# Patient Record
Sex: FEMALE | Race: WHITE | HISPANIC OR LATINO | Employment: UNEMPLOYED | URBAN - METROPOLITAN AREA
[De-identification: names, ages, dates, MRNs, and addresses within clinical notes are randomized per-mention and may not be internally consistent; named-entity substitution may affect disease eponyms.]

---

## 2017-05-13 ENCOUNTER — HOSPITAL ENCOUNTER (EMERGENCY)
Facility: MEDICAL CENTER | Age: 27
End: 2017-05-14
Attending: EMERGENCY MEDICINE

## 2017-05-13 VITALS
TEMPERATURE: 96.8 F | HEART RATE: 85 BPM | SYSTOLIC BLOOD PRESSURE: 99 MMHG | RESPIRATION RATE: 14 BRPM | BODY MASS INDEX: 32.53 KG/M2 | WEIGHT: 150.79 LBS | OXYGEN SATURATION: 98 % | HEIGHT: 57 IN | DIASTOLIC BLOOD PRESSURE: 66 MMHG

## 2017-05-13 DIAGNOSIS — B96.89 BACTERIAL VAGINOSIS: ICD-10-CM

## 2017-05-13 DIAGNOSIS — N76.0 BACTERIAL VAGINOSIS: ICD-10-CM

## 2017-05-13 LAB
AMORPH CRY #/AREA URNS HPF: PRESENT /HPF
APPEARANCE UR: ABNORMAL
BACTERIA GENITAL QL WET PREP: NORMAL
BILIRUB UR QL STRIP.AUTO: NEGATIVE
COLOR UR: ABNORMAL
EPI CELLS #/AREA URNS HPF: NORMAL /HPF
GLUCOSE UR STRIP.AUTO-MCNC: NEGATIVE MG/DL
KETONES UR STRIP.AUTO-MCNC: NEGATIVE MG/DL
LEUKOCYTE ESTERASE UR QL STRIP.AUTO: ABNORMAL
MICRO URNS: ABNORMAL
NITRITE UR QL STRIP.AUTO: NEGATIVE
PH UR STRIP.AUTO: 7 [PH]
PROT UR QL STRIP: NEGATIVE MG/DL
RBC # URNS HPF: NORMAL /HPF
RBC UR QL AUTO: NEGATIVE
SIGNIFICANT IND 70042: NORMAL
SITE SITE: NORMAL
SOURCE SOURCE: NORMAL
SP GR UR STRIP.AUTO: 1.01
WBC #/AREA URNS HPF: NORMAL /HPF

## 2017-05-13 PROCEDURE — 81001 URINALYSIS AUTO W/SCOPE: CPT

## 2017-05-13 PROCEDURE — 99284 EMERGENCY DEPT VISIT MOD MDM: CPT

## 2017-05-13 PROCEDURE — 87491 CHLMYD TRACH DNA AMP PROBE: CPT

## 2017-05-13 PROCEDURE — 87591 N.GONORRHOEAE DNA AMP PROB: CPT

## 2017-05-13 RX ORDER — METRONIDAZOLE 500 MG/1
500 TABLET ORAL 2 TIMES DAILY
Qty: 14 TAB | Refills: 0 | Status: SHIPPED | OUTPATIENT
Start: 2017-05-13 | End: 2017-05-20

## 2017-05-13 NOTE — ED AVS SNAPSHOT
5/13/2017    Carolina Leong  [address] [place] [address]    Dear Carolina:    [facility] wants to ensure your discharge home is safe and you or your loved ones have had all of your questions answered regarding your care after you leave the hospital.    Below is a list of resources and contact information should you have any questions regarding your hospital stay, follow-up instructions, or active medical symptoms.    Questions or Concerns Regarding… Contact   Medical Questions Related to Your Discharge  (7 days a week, 8am-5pm) Contact a Nurse Care Coordinator   [phone]   Medical Questions Not Related to Your Discharge  (24 hours a day / 7 days a week)  Contact the Nurse Health Line   [phone]    Medications or Discharge Instructions Refer to your discharge packet   or contact your [facility] Primary Care Provider   [phone]   Follow-up Appointment(s) Schedule your appointment via BitX   or contact Scheduling [phone]   Billing Review your statement via BitX  or contact Billing [phone]   Medical Records Review your records via BitX   or contact Medical Records [phone]     You may receive a telephone call within two days of discharge. This call is to make certain you understand your discharge instructions and have the opportunity to have any questions answered. You can also easily access your medical information, test results and upcoming appointments via the BitX free online health management tool. You can learn more and sign up at [employer]/BitX. For assistance setting up your BitX account, please call [phone].    Once again, we want to ensure your discharge home is safe and that you have a clear understanding of any next steps in your care. If you have any questions or concerns, please do not hesitate to contact us, we are here for you. Thank you for choosing [facility] for your healthcare needs.    Sincerely,    Your [facility] Healthcare Team

## 2017-05-13 NOTE — ED AVS SNAPSHOT
Home Care Instructions                                                                                                                Carolina Leong   MRN: [id number]    Department:  [facility], Emergency Dept   Date of Visit:  5/13/2017            [facility], Emergency Dept    [address] [address]    Phone:  [phone]      You were seen by     1. Vinicio Bhagat M.D.    2. Ghazal Frias M.D.      Your Diagnosis Was     Bacterial vaginosis     N76.0, B96.89       Follow-up Information     1. Follow up with [facility]. Schedule an appointment as soon as possible for a visit in 2 days.    Contact information    [address] [place] [id number] [phone]        Medication Information     Review all of your home medications and newly ordered medications with your primary doctor and/or pharmacist as soon as possible. Follow medication instructions as directed by your doctor and/or pharmacist.     Please keep your complete medication list with you and share with your physician. Update the information when medications are discontinued, doses are changed, or new medications (including over-the-counter products) are added; and carry medication information at all times in the event of emergency situations.               Medication List      START taking these medications        Instructions    Morning Afternoon Evening Bedtime    metronidazole 500 MG Tabs   Commonly known as:  FLAGYL        Take 1 Tab by mouth 2 Times a Day for 7 days.   Dose:  500 mg                          ASK your doctor about these medications        Instructions    Morning Afternoon Evening Bedtime    ibuprofen 800 MG Tabs   Commonly known as:  MOTRIN        Take 1 Tab by mouth every 8 hours as needed (Cramping).   Dose:  800 mg                        PRENATAL VITAMIN PO        Take  by mouth.                             Where to Get Your Medications      You  can get these medications from any pharmacy     Bring a paper prescription for each of these medications    - metronidazole 500 MG Tabs            Procedures and tests performed during your visit     CHLAMYDIA & GC BY PCR    URINALYSIS    URINE MICROSCOPIC (W/UA)    WET PREP        Discharge Instructions       Vaginosis bacteriana  (Bacterial Vaginosis)  La vaginosis bacteriana es neela infección vaginal que perturba el equilibrio normal de las bacterias que se encuentran en la vagina. Es el resultado de un crecimiento excesivo de ciertas bacterias. Esta es la infección vaginal más frecuente en mujeres en edad reproductiva. El tratamiento es importante para prevenir complicaciones, especialmente en mujeres embarazadas, dado que puede causar un parto prematuro.  CAUSAS   La vaginosis bacteriana se origina por un aumento de bacterias nocivas que, generalmente, están presentes en cantidades más pequeñas en la vagina. Varios tipos diferentes de bacterias pueden causar esta afección. Sin embargo, la causa de arroyo desarrollo no se comprende totalmente.  FACTORES DE RIESGO  Ciertas actividades o comportamientos pueden exponerlo a un mayor riesgo de desarrollar vaginosis bacteriana, entre los que se incluyen:  · Tener neela nueva mary sexual o múltiples parejas sexuales.  · Las duchas vaginales  · El uso del DIU (dispositivo intrauterino) chio método anticonceptivo.  El contagio no se produce en julienne, por ropas de cama, en piscinas o por contacto con objetos.  SIGNOS Y SÍNTOMAS   Algunas mujeres que padecen vaginosis bacteriana no presentan signos ni síntomas. Los síntomas más comunes son:  · Secreción vaginal de color grisáceo.  · Secreción vaginal con olor similar al pescado, especialmente después de mantener relaciones sexuales.  · Picazón o sensación de ardor en la vagina o la vulva.  · Ardor o dolor al orinar.  DIAGNÓSTICO   Arroyo médico analizará arroyo historia clínica y le examinará la vagina para detectar signos de vaginosis  bacteriana. Puede tomarle neela muestra de flujo vaginal. Arroyo médico examinará esta muestra con un microscopio para controlar las bacterias y células anormales. También puede realizarse un análisis del pH vaginal.   TRATAMIENTO   La vaginosis bacteriana puede tratarse con antibióticos, en forma de comprimidos o de crema vaginal. Puede indicarse neela segunda tanda de antibióticos si la afección se repite después del tratamiento. Debido a que la vaginosis bacteriana aumenta el riesgo de contraer enfermedades de transmisión sexual, el tratamiento puede ayudar a reducir el riesgo de clamidia, gonorrea, VIH y herpes.  INSTRUCCIONES PARA EL CUIDADO EN EL HOGAR   · Lind solo medicamentos de venta zoltan o recetados, según las indicaciones del médico.  · Si le pina recetado antibióticos, tómelos chio se le indicó. Asegúrese de que finaliza la prescripción completa aunque se sienta mejor.  · Comunique a juani compañeros sexuales que sufre neela infección vaginal. Deben consultar a arroyo médico y recibir tratamiento si tienen problemas, chio picazón o neela erupción cutánea leve.  · Radha el tratamiento, es importante que siga estas indicaciones:  ¨ Evite mantener relaciones sexuales o use preservativos de la forma correcta.  ¨ No se ray duchas vaginales.  ¨ Evite consumir alcohol chio se lo haya indicado el médico.  ¨ Evite amamantar chio se lo haya indicado el médico.  SOLICITE ATENCIÓN MÉDICA SI:   · Juani síntomas no mejoran después de 3 días de tratamiento.  · Aumenta la secreción o el dolor.  · Tiene fiebre.  ASEGÚRESE DE QUE:   · Comprende estas instrucciones.  · Controlará arroyo afección.  · Recibirá ayuda de inmediato si no mejora o si empeora.  PARA OBTENER MÁS INFORMACIÓN   Centros para el control y la prevención de enfermedades (Centers for Disease Control and Prevention, CDC): www.cdc.gov/std  Asociación Estadounidense de la Jana Sexual (American Sexual Health Association, SHA): www.ashastd.org      Esta información no tiene  chio fin reemplazar el consejo del médico. Asegúrese de hacerle al médico cualquier pregunta que tenga.     Document Released: 03/26/2009 Document Revised: 01/08/2016  Elsevier Interactive Patient Education ©2016 Elsevier Inc.            Patient Information     Patient Information    Following emergency treatment: all patient requiring follow-up care must return either to a private physician or a clinic if your condition worsens before you are able to obtain further medical attention, please return to the emergency room.     Billing Information    At [facility], we work to make the billing process streamlined for our patients.  Our Representatives are here to answer any questions you may have regarding your hospital bill.  If you have insurance coverage and have supplied your insurance information to us, we will submit a claim to your insurer on your behalf.  Should you have any questions regarding your bill, we can be reached online or by phone as follows:  Online: You are able pay your bills online or live chat with our representatives about any billing questions you may have. We are here to help Monday - Friday from 8:00am to 7:30pm and 9:00am - 12:00pm on Saturdays.  Please visit [email or web address]  for more information.   Phone:  [phone] or [phone]    Please note that your emergency physician, surgeon, pathologist, radiologist, anesthesiologist, and other specialists are not employed by [facility] and will therefore bill separately for their services.  Please contact them directly for any questions concerning their bills at the numbers below:     Emergency Physician Services:  [phone]  [facility]:  [phone]  [facility]:  [phone]  [facility]:  [phone]    1. Your final bill may vary from the amount quoted upon discharge if all procedures are not complete at that time, or if your doctor has  additional procedures of which we are not aware. You will receive an additional bill if you return to the Emergency Department at [facility] for suture removal regardless of the facility of which the sutures were placed.     2. Please arrange for settlement of this account at the emergency registration.    3. All self-pay accounts are due in full at the time of treatment.  If you are unable to meet this obligation then payment is expected within 4-5 days.     4. If you have had radiology studies (CT, X-ray, Ultrasound, MRI), you have received a preliminary result during your emergency department visit. Please contact the radiology department [phone] to receive a copy of your final result. Please discuss the Final result with your primary physician or with the follow up physician provided.     Crisis Hotline:  [place] Crisis Hotline:  0-777-USRYXRW or 1-496.336.9804  [place] Crisis Hotline:    1-316.507.4019 or 850-906-2639         ED Discharge Follow Up Questions    1. In order to provide you with very good care, we would like to follow up with a phone call in the next few days.  May we have your permission to contact you?     YES /  NO    2. What is the best phone number to call you? (       )_____-__________    3. What is the best time to call you?      Morning  /  Afternoon  /  Evening                   Patient Signature:  ____________________________________________________________    Date:  ____________________________________________________________

## 2017-05-13 NOTE — ED AVS SNAPSHOT
AlwaySupport Access Code: [email or web address]  Expires: 6/12/2017 11:54 PM    Your email address is not on file at [facility].  Email Addresses are required for you to sign up for AlwaySupport, please contact [phone] to verify your personal information and to provide your email address prior to attempting to register for AlwaySupport.    Carolina Leong  [address] [place] [address]    AlwaySupport  A secure, online tool to manage your health information     [employer]’s AlwaySupport® is a secure, online tool that connects you to your personalized health information from the privacy of your home -- day or night - making it very easy for you to manage your healthcare. Once the activation process is completed, you can even access your medical information using the AlwaySupport stiven, which is available for free in the Apple Stiven store or Google Play store.     To learn more about AlwaySupport, visit [email or web address]    There are two levels of access available (as shown below):   My Chart Features  [facility] Primary Care Doctor [facility]  Specialists [facility]  Urgent  Care Non-[facility] Primary Care Doctor   Email your healthcare team securely and privately 24/7 X X X    Manage appointments: schedule your next appointment; view details of past/upcoming appointments X      Request prescription refills. X      View recent personal medical records, including lab and immunizations X X X X   View health record, including health history, allergies, medications X X X X   Read reports about your outpatient visits, procedures, consult and ER notes X X X X   See your discharge summary, which is a recap of your hospital and/or ER visit that includes your diagnosis, lab results, and care plan X X  X     How to register for AlwaySupport:  Once your e-mail address has been verified, follow the following steps to sign up for AlwaySupport.     1. Go to  [email or web address]  2. Click on the Sign Up Now box, which takes you to the New Member Sign Up page. You  will need to provide the following information:  a. Enter your [employer] Access Code exactly as it appears at the top of this page. (You will not need to use this code after you’ve completed the sign-up process. If you do not sign up before the expiration date, you must request a new code.)   b. Enter your date of birth.   c. Enter your home email address.   d. Click Submit, and follow the next screen’s instructions.  3. Create a [employer] ID. This will be your [employer] login ID and cannot be changed, so think of one that is secure and easy to remember.  4. Create a [employer] password. You can change your password at any time.  5. Enter your Password Reset Question and Answer. This can be used at a later time if you forget your password.   6. Enter your e-mail address. This allows you to receive e-mail notifications when new information is available in [employer].  7. Click Sign Up. You can now view your health information.    For assistance activating your [employer] account, call [phone]

## 2017-05-14 NOTE — ED NOTES
Pt c/o vaginal itching and burning with urination x 2 days. Pt states she is pregnant that, but not sure how far along. Last period was in December. Pt is  with 3 full term healthy vaginal deliveries.

## 2017-05-14 NOTE — ED NOTES
.  Chief Complaint   Patient presents with   • Vaginal Itching     pt co vaginal itching and burning for 2 days now, denies any discharge, is 3months pregnant,      .Pt Informed regarding triage process and verbalized understanding to inform triage tech or RN for any changes in condition.  Placed in lobby.

## 2017-05-14 NOTE — ED NOTES
All lines and monitors d/c'd. Discharge paperwork and medications reviewed. Pt states she understands and had no questions. Pt encouraged to follow-up with PCP and come back to ER with worsening symptoms.  Pt verbalizes understanding. Pt ambulated out of ED with steady gait.

## 2017-05-14 NOTE — ED PROVIDER NOTES
"ED Provider Note    Scribed for Ghazal Frias M.D. by Mg Velasquez. 2017, 9:55 PM.    Primary care provider: Pt States None  Means of arrival: Private vehicle   History obtained from: Patient   History limited by: None     CHIEF COMPLAINT  Chief Complaint   Patient presents with   • Vaginal Itching     pt co vaginal itching and burning for 2 days now, denies any discharge, is 3months pregnant,        HPI  Carolina Leong is a 27 y.o. female who presents to the Emergency Department with vaginal itching for two days. Patient also reports burning with urination. She is currently 3 months gravid and is . Patient also complains of mild bilateral flank pain. Patient denies vaginal discharge, hematuria, fever, chills, abdominal pain, or other symptoms.     REVIEW OF SYSTEMS  Pertinent positives include vaginal itching, burning with urination, flank pain. Pertinent negatives include no vaginal discharge, hematuria, fever, chills, abdominal pain. E.      PAST MEDICAL HISTORY       SURGICAL HISTORY  patient denies any surgical history    SOCIAL HISTORY  Social History   Substance Use Topics   • Smoking status: Never Smoker    • Smokeless tobacco: Never Used   • Alcohol Use: No      Comment: occ before pregnancy       History   Drug Use No       FAMILY HISTORY  History reviewed. No pertinent family history.    CURRENT MEDICATIONS  Home Medications     Reviewed by Nan De Leon R.N. (Registered Nurse) on 17 at 213  Med List Status: Partial    Medication Last Dose Status    ibuprofen (MOTRIN) 800 MG TABS not taking Active    Prenatal Vit-Fe Sulfate-FA (PRENATAL VITAMIN PO) 2017 Active                ALLERGIES  No Known Allergies    PHYSICAL EXAM  VITAL SIGNS: /92 mmHg  Pulse 92  Temp(Src) 36 °C (96.8 °F)  Resp 14  Ht 1.448 m (4' 9\")  Wt 68.4 kg (150 lb 12.7 oz)  BMI 32.62 kg/m2  SpO2 98%  LMP 2016 (Approximate)    Constitutional:  Alert and in no apparent " distress.  HENT: Normocephalic, Bilateral external ears normal. Nose normal.   Eyes: Pupils are equal and reactive. Conjunctiva normal, non-icteric.   Cardiovascular:  Good Perfusion  Thorax & Lungs: Easy unlabored respirations  Abdomen:  No pain with movement   Skin: Visualized skin is  Dry, No erythema, No rash.   : Cottage cheese whitish discharge  Extremities:  Moves all extremities   Neurologic: Alert, Grossly non-focal.   Psychiatric: Appropriate Mood    DIAGNOSTIC STUDIES / PROCEDURES    LABS  Results for orders placed or performed during the hospital encounter of 05/13/17   URINALYSIS   Result Value Ref Range    Micro Urine Req Microscopic     Color Lt. Yellow     Character Sl Cloudy (A)     Specific Gravity 1.015 <1.035    Ph 7.0 5.0-8.0    Glucose Negative Negative mg/dL    Ketones Negative Negative mg/dL    Protein Negative Negative mg/dL    Bilirubin Negative Negative    Nitrite Negative Negative    Leukocyte Esterase Large (A) Negative    Occult Blood Negative Negative   URINE MICROSCOPIC (W/UA)   Result Value Ref Range    WBC 2-5 /hpf    RBC 0-2 /hpf    Epithelial Cells Few /hpf    Amorphous Crystal Present /hpf   WET PREP   Result Value Ref Range    Significant Indicator NEG     Source GEN     Site VAGINAL     Wet Prep For Parasites       Few clue cells seen.  Few WBC's seen.  No yeast.  No motile Trichomonas seen.     CHLAMYDIA & GC BY PCR   Result Value Ref Range    Source Other       All labs reviewed by me.    COURSE & MEDICAL DECISION MAKING  Nursing notes and vital signs were reviewed. (See chart for details)  The patient's records were reviewed, history was obtained from the patient;     The patient presents with vaginal itching, and the differential diagnosis includes but is not limited to UTI, candidal infection, bacteria vaginosis.       9:55 PM Pelvic exam performed with female chaperone scribe in the room.   Initial orders in the Emergency Department included urine microscopic,  urinalysis, wet prep, chlamydia & GC. I explained to the patient I will order labs to evaluate and complete an ultrasound.    10:29 PM Bedside ultrasound completed by me showed an intrauterine pregnancy.    ED testing reveals clue cells. I suspect she has bacterial vaginosis. On review it is pregnancy category B and I will give her a seven-day course, she will follow up with primary care physician      The patient was discharged home with an information sheet on bacterial vagniosis and told to return immediately for any signs or symptoms listed, but specifically if fever, vomting.  The patient agreed to the discharge precautions and follow-up plan which is documented in EPIC.    DISPOSITION:  Patient will be discharged home in stable condition.    FOLLOW UP:  [facility]  [address] [address]  [phone]    Schedule an appointment as soon as possible for a visit in 2 days        OUTPATIENT MEDICATIONS:  New Prescriptions    METRONIDAZOLE (FLAGYL) 500 MG TAB    Take 1 Tab by mouth 2 Times a Day for 7 days.       FINAL IMPRESSION  1. Bacterial vaginosis          IMg (Vineet), am scribing for, and in the presence of, Ghazal Frias M.D..    Electronically signed by: Mg Velasquez (Vineet), 5/13/2017    IGhazal M.D. personally performed the services described in this documentation, as scribed by Mg Velasquez in my presence, and it is both accurate and complete.    The note accurately reflects work and decisions made by me.  Ghazal Frias  5/13/2017  11:54 PM

## 2017-05-14 NOTE — DISCHARGE INSTRUCTIONS
Vaginosis bacteriana  (Bacterial Vaginosis)  La vaginosis bacteriana es neela infección vaginal que perturba el equilibrio normal de las bacterias que se encuentran en la vagina. Es el resultado de un crecimiento excesivo de ciertas bacterias. Esta es la infección vaginal más frecuente en mujeres en edad reproductiva. El tratamiento es importante para prevenir complicaciones, especialmente en mujeres embarazadas, dado que puede causar un parto prematuro.  CAUSAS   La vaginosis bacteriana se origina por un aumento de bacterias nocivas que, generalmente, están presentes en cantidades más pequeñas en la vagina. Varios tipos diferentes de bacterias pueden causar esta afección. Sin embargo, la causa de arroyo desarrollo no se comprende totalmente.  FACTORES DE RIESGO  Ciertas actividades o comportamientos pueden exponerlo a un mayor riesgo de desarrollar vaginosis bacteriana, entre los que se incluyen:  · Tener neela nueva mary sexual o múltiples parejas sexuales.  · Las duchas vaginales  · El uso del DIU (dispositivo intrauterino) chio método anticonceptivo.  El contagio no se produce en julienne, por ropas de cama, en piscinas o por contacto con objetos.  SIGNOS Y SÍNTOMAS   Algunas mujeres que padecen vaginosis bacteriana no presentan signos ni síntomas. Los síntomas más comunes son:  · Secreción vaginal de color grisáceo.  · Secreción vaginal con olor similar al pescado, especialmente después de mantener relaciones sexuales.  · Picazón o sensación de ardor en la vagina o la vulva.  · Ardor o dolor al orinar.  DIAGNÓSTICO   Arroyo médico analizará arroyo historia clínica y le examinará la vagina para detectar signos de vaginosis bacteriana. Puede tomarle neela muestra de flujo vaginal. Arroyo médico examinará esta muestra con un microscopio para controlar las bacterias y células anormales. También puede realizarse un análisis del pH vaginal.   TRATAMIENTO   La vaginosis bacteriana puede tratarse con antibióticos, en forma de comprimidos o  de crema vaginal. Puede indicarse neela segunda tanda de antibióticos si la afección se repite después del tratamiento. Debido a que la vaginosis bacteriana aumenta el riesgo de contraer enfermedades de transmisión sexual, el tratamiento puede ayudar a reducir el riesgo de clamidia, gonorrea, VIH y herpes.  INSTRUCCIONES PARA EL CUIDADO EN EL HOGAR   · [place] solo medicamentos de venta zoltan o recetados, según las indicaciones del médico.  · Si le pina recetado antibióticos, tómelos chio se le indicó. Asegúrese de que finaliza la prescripción completa aunque se sienta mejor.  · Comunique a juani compañeros sexuales que sufre neela infección vaginal. Deben consultar a arroyo médico y recibir tratamiento si tienen problemas, chio picazón o neela erupción cutánea leve.  · Radha el tratamiento, es importante que siga estas indicaciones:  ¨ Evite mantener relaciones sexuales o use preservativos de la forma correcta.  ¨ No se ray duchas vaginales.  ¨ Evite consumir alcohol chio se lo haya indicado el médico.  ¨ Evite amamantar chio se lo haya indicado el médico.  SOLICITE ATENCIÓN MÉDICA SI:   · Juani síntomas no mejoran después de 3 días de tratamiento.  · Aumenta la secreción o el dolor.  · Tiene fiebre.  ASEGÚRESE DE QUE:   · Comprende estas instrucciones.  · Controlará arroyo afección.  · Recibirá ayuda de inmediato si no mejora o si empeora.  PARA OBTENER MÁS INFORMACIÓN   Centros para el control y la prevención de enfermedades (Centers for Disease Control and Prevention, CDC): www.cdc.gov/std  Asociación Est[place] de la Jana Sexual (American Sexual Health Association, SHA): www.ashastd.org      Esta información no tiene chio fin reemplazar el consejo del médico. Asegúrese de hacerle al médico cualquier pregunta que tenga.     Document Released: 03/26/2009 Document Revised: 01/08/2016  Elsevier Interactive Patient Education ©2016 Elsevier Inc.

## 2017-05-15 LAB
C TRACH DNA SPEC QL NAA+PROBE: NEGATIVE
N GONORRHOEA DNA SPEC QL NAA+PROBE: NEGATIVE
SPECIMEN SOURCE: NORMAL

## 2017-06-08 ENCOUNTER — APPOINTMENT (OUTPATIENT)
Dept: OBGYN | Facility: CLINIC | Age: 27
End: 2017-06-08
Payer: MEDICAID

## 2017-06-21 ENCOUNTER — INITIAL PRENATAL (OUTPATIENT)
Dept: OBGYN | Facility: CLINIC | Age: 27
End: 2017-06-21

## 2017-06-21 ENCOUNTER — HOSPITAL ENCOUNTER (OUTPATIENT)
Facility: MEDICAL CENTER | Age: 27
End: 2017-06-21
Attending: NURSE PRACTITIONER
Payer: COMMERCIAL

## 2017-06-21 VITALS
WEIGHT: 152 LBS | DIASTOLIC BLOOD PRESSURE: 60 MMHG | HEIGHT: 60 IN | BODY MASS INDEX: 29.84 KG/M2 | SYSTOLIC BLOOD PRESSURE: 100 MMHG

## 2017-06-21 DIAGNOSIS — Z34.80 SUPERVISION OF OTHER NORMAL PREGNANCY, ANTEPARTUM: ICD-10-CM

## 2017-06-21 LAB
APPEARANCE UR: NORMAL
BILIRUB UR STRIP-MCNC: NORMAL MG/DL
COLOR UR AUTO: NORMAL
CYTOLOGY REG CYTOL: NORMAL
GLUCOSE UR STRIP.AUTO-MCNC: NEGATIVE MG/DL
KETONES UR STRIP.AUTO-MCNC: NEGATIVE MG/DL
LEUKOCYTE ESTERASE UR QL STRIP.AUTO: NORMAL
NITRITE UR QL STRIP.AUTO: NEGATIVE
PH UR STRIP.AUTO: 6 [PH] (ref 5–8)
PROT UR QL STRIP: NORMAL MG/DL
RBC UR QL AUTO: NEGATIVE
SP GR UR STRIP.AUTO: 1.02
UROBILINOGEN UR STRIP-MCNC: NORMAL MG/DL

## 2017-06-21 PROCEDURE — 90715 TDAP VACCINE 7 YRS/> IM: CPT | Performed by: NURSE PRACTITIONER

## 2017-06-21 PROCEDURE — 81002 URINALYSIS NONAUTO W/O SCOPE: CPT | Performed by: NURSE PRACTITIONER

## 2017-06-21 PROCEDURE — 59401 PR NEW OB VISIT: CPT | Performed by: NURSE PRACTITIONER

## 2017-06-21 PROCEDURE — 90471 IMMUNIZATION ADMIN: CPT | Performed by: NURSE PRACTITIONER

## 2017-06-21 PROCEDURE — 8198 PREG CTR PKG RATE (SYSTEM): Performed by: NURSE PRACTITIONER

## 2017-06-21 NOTE — PROGRESS NOTES
Pt. Here for NOB visit today.  Good FM kick count sheet given today and explained.  # [phone] or [phone]  First prenatal care  Pt. States still having vaginal itching states went to [facility] on 5/2017 for vaginal itching states took all medication but still having symptoms.   Pharmacy verified.   Pt given 1 HR GTT today.   Tdap vaccine given today, right deltoid. Screening checklist reviewed with pt.    BTL consent form signed today.

## 2017-06-21 NOTE — PROGRESS NOTES
S:  Carolina Leong is a 27 y.o.  who presents for her new OB exam.  She is 27w4d with and IRIS of Estimated Date of Delivery: 17 by LMP. She has no complaints except for persistent vaginal pruritis. She had ER visit in May for same sx and was given Flagyl to no resolution. No prenatal ultrasound was done. She c/o right cracked nipple with no pruritis or abnormal discharge. She still occasionally breast feeds other child who is 2 years of age. States has been sore and problematic for about 2 months. Child does not have thrush, but does have dental caries. States the child bites her when feeding. No other reported issues. Hx of GDM with poor compliance in a previous pregnancy.  She is currently not working. No heavy lifting or chemical exposure.     Too late AFP.  declines CF.  Denies VB, LOF, or cramping.  Denies dysuria, vaginal DC. Reports good fetal movement.      Pregnancy is desired.      Past Medical History   Diagnosis Date   • BV (bacterial vaginosis) 2017     History reviewed. No pertinent family history.  Social History     Social History   • Marital Status:      Spouse Name: N/A   • Number of Children: N/A   • Years of Education: N/A     Occupational History   • Not on file.     Social History Main Topics   • Smoking status: Never Smoker    • Smokeless tobacco: Never Used   • Alcohol Use: No      Comment: occ before pregnancy    • Drug Use: No   • Sexual Activity:     Partners: Male     Birth Control/ Protection: Condom     Other Topics Concern   • Not on file     Social History Narrative     OB History    Para Term  AB SAB TAB Ectopic Multiple Living   5 3 3  1 1    3      # Outcome Date GA Lbr Vijay/2nd Weight Sex Delivery Anes PTL Lv   5 Current            4 Term 07/30/15 39w0d  3.175 kg (7 lb) M Vag-Spont EPI N Y      Comments: Pt states no complications.    3 Term 14 39w0d  3.175 kg (7 lb) M Vag-Spont None Y Y      Comments: Pt states no complications   2  Term 12 39w6d  3.515 kg (7 lb 12 oz) M Vag-Spont EPI N Y      Comments: Denies complications.   1 SAB  12w0d             Comments: no D&C needed         History of HSV I or II in self or partner: no  History of Thyroid problems: no    O:    Filed Vitals:    17 0831   BP: 100/60   Height: 1.524 m (5')   Weight: 68.947 kg (152 lb)      See Prenatal Physical.    Right dry, cracked nipple.   Wet mount: positive hyphae, neg clues, neg trich      A:   1.  IUP @ 27w4d per LMP        2.  S=D        3.  See problem list below        4.  Vulvovaginal candida        5.  Cracked nipple       Patient Active Problem List    Diagnosis Date Noted   • History of Gestational diabetes 2015   • Late prenatal care @ 31 wks  2015   • History of  labor with delivery at 35 wks  2015         P:  1.  GC/CT & pap done        2.  Prenatal labs ordered - lab slip given including gestational glucose        3.  Discussed PNV, diet, avoidances and adequate water intake        4.  NOB packet given        5.  Return to office in 2 wks        6.  Complete OB US first available.         7.  Monistat 7        8. Lanolin cream to nipple. Patient plans to stop breast feeding. I want this to heal before having to feed . Since this is so problematic, I think preparing for feeding the  should be first priority.        9.  TDAP today, BTL papers signed today.     No orders of the defined types were placed in this encounter.

## 2017-06-21 NOTE — Clinical Note
Cuente los Movimientos de arroyo Bebé  Otro paso importante para la basilia de arroyo bebé    Carolina Meyerl Salo     [facility]            Dept: [phone]    ¿Cuántas semanas tiene de embarazo? 27w4d    Fecha cuando tiene que comenzar a contar el movimiento: 6/21/17                  Vaughn debe usar rose mary diagrama    Neela manera en que arroyo doctor puede controlar a basilia de arroyo bebé es sabiendo cuantas veces se mueve arroyo bebé en el útero, o por medio de las “pataditas”.  Usted podrá ayudarle a arroyo médico al usar cada día el siguiente diagrama.    Cada día, usted debe prestar atención a cuantas horas le lleva a arroyo bebé moverse 10 veces.  Comience a contar en la mañana, lo antes posible después de haberse levantado.    · Primeramente, escriba la hora en que se mueve arroyo bebé, hasta llegar a 10 veces.  · Colóquele un check o palomita a cada cuadrito cada vez que arroyo bebé se mueva hasta que complete 10 veces.  · Escriba la hora cuando termine de contar 10 veces en la última columna.  · Sume el total del tiempo que le llevó contar los 10 movimientos.  · Finalmente, complete el cuadrito de cuantas horas le llevó hacerlo.    Después de vernell contado los 10 movimientos, ya no tendrá que contar los demás movimientos por el kristin del día.  A la mañana siguiente, comience a contar de nuevo cuantas veces se mueve el bebé desde el momento en que se levante.    ¿Qué tendría que considerarse un “movimiento”?  Es difícil de decirlo porque es distinto de neela madre a otra, y de un embarazo a otro.  Lo importante es que cuente el movimiento de la misma manera terell el transcurso de arroyo embarazo.  Si tiene preguntas adicionales, pregúntele a arroyo doctor.    ¡Cuente cuidadosamente cada día!     MUESTRA:  Semana 28    ¿Cuántas horas le ha llevado sentir 10 movimientos?        Hora de Inicio     1     2     3     4     5     6     7     8     9     10   Hora de Finlizar   Radha. 8:20 ·  ·  ·  ·  ·  ·  ·  ·  ·  ·  11:40   Mar.               Mié.                Jue.               Vie.               Sáb.               Dom.                 IMPORTANTE:  Usted debe contactar a arroyo doctor si le lleva más de 2 horas sentir 10 movimientos de arroyo bebé.    Cada mañana, escriba la hora de inicio y comience a contar los movimientos de arroyo bebé.  Hágalo colocándole un check o palomita a cada cuadrito cada vez que sienta un movimiento de arroyo bebé.  Cuando haya sentido 10 “pataditas”, escriba la hora en que terminó de contar en la última columna.  Luego, complete en la cajita (arriba de la skyler de check o palomita) el número total de horas que le llevó hacerlo.  Asegúrese de leer completamente las instrucciones en la página anterior.

## 2017-06-21 NOTE — MR AVS SNAPSHOT
Carolina Lieberman Salo   2017 8:30 AM   Initial Prenatal   MRN: [id number]    Department[facility]   Dept Phone:  [phone]    Description:  Female : 1990   Provider:  Debora Sandhu D.N.P.; [facility]           Allergies as of 2017     No Known Allergies      You were diagnosed with     Supervision of other normal pregnancy, antepartum   [[id number]]         Vital Signs     Blood Pressure Height Weight Body Mass Index Last Menstrual Period Breastfeeding?    100/60 mmHg 1.524 m (5') 68.947 kg (152 lb) 29.69 kg/m2 12/10/2016 Unknown    Smoking Status                   Never Smoker            Basic Information     Date Of Birth Sex Race Ethnicity Preferred Language    1990 Female Other  Origin (Citizen of [place]rian,Citizen of [place],Macedonian, etc) Citizen of [place]      Your appointments     2017  1:15 PM   OB Follow Up with Debora Sandhu D.N.P.   [facility] ([facility])    [address]  [place] [id number] [phone]            2017 10:00 AM   US PREG 60 with PREG CTR US 1   [facility] ([facility])    [facility]  [address]  [place] [address] [phone]           For [facility] patients only: 1. Please arrive 15 min prior to your appointment time. 2. If you're late, you will be rescheduled for the next available appointment. 3. If you need to reschedule your appointment, please call us at [phone] 48 hours prior to your appointment. 4. Do not bring children as they will not be allowed in exam room. 5. Only one family member may be present in room during exam. 6. The exam will be 30-60 minutes depending on the exam ordered by the physician. 7. The sonographer is not allowed to discuss findings during the exam. Your provider will go over the results with you at your next appointment. 8. The purpose of this ultrasound is to determine if baby is healthy. Diagnostic ultrasounds are  NOT to determine the gender of the baby. 9. NO photography or video recording is allowed in exam room. 10. NO cell phones allowed in the exam room. INFORMACION SOBRE ARROYO ULTRASONIDO 1. Por favor de llegar 15 minutos antes de arroyo saurav. 2. Si llega tarde, le tenemos que cambiar la saurav para otra fecha. 3. Si necesita cambiar arroyo saurav, por favor llame 48 horas antes de la saurav. [phone] 4. Por favor no traer niños. No se permiten en cuarto de Ultrasonido. 5. Solamente se permite neela persona en el cuarto terell el examen. 6. El examen dura 30-60 minutos, dependiendo del examen ordenado por el Doctor. 7. El Sonógrafo no está autorizado hablar sobre arroyo examen. Arroyo doctor o partera le va explicar los resultados en arroyo próxima saurav. 8. El propósito del Ultrasonido es para determinar si arroyo devonte viene saludable. No es para determinar el sexo de arroyo devonte. 9. Por favor no fotos o cámaras de grabar. 10. No celulares permitidos en el cuarto de examen.              Problem List              ICD-10-CM Priority Class Noted - Resolved    Late prenatal care @ 31 wks  O09.30   2015 - Present    History of  labor with delivery at 35 wks  Z87.51   2015 - Present    History of Gestational diabetes O24.419   2015 - Present      Health Maintenance        Date Due Completion Dates    IMM HEP B VACCINE (1 of 3 - Primary Series) 1990 ---    IMM HEP A VACCINE (1 of 2 - Standard Series) 1991 ---    IMM VARICELLA (CHICKENPOX) VACCINE (1 of 2 - 2 Dose Adolescent Series) 2003 ---    PAP SMEAR 2/3/2015 2/3/2012    IMM DTaP/Tdap/Td Vaccine (2 - Td) 2025, 2014, 2014            Results     POCT Urinalysis      Component Value Standard Range & Units    POC Color  Negative    POC Appearance  Negative    POC Leukocyte Esterase large Negative    POC Nitrites negative Negative    POC Urobiligen  Negative (0.2) mg/dL    POC Protein trace Negative mg/dL    POC Urine PH 6.0 5.0 - 8.0    POC Blood negative  Negative    POC Specific Gravity 1.020 <1.005 - >1.030    POC Ketones negative Negative mg/dL    POC Biliruben  Negative mg/dL    POC Glucose negative Negative mg/dL                        Current Immunizations     Tdap Vaccine  Incomplete, 6/8/2015  9:15 AM, 7/9/2014  1:05 PM, 6/26/2014      Below and/or attached are the medications your provider expects you to take. Review all of your home medications and newly ordered medications with your provider and/or pharmacist. Follow medication instructions as directed by your provider and/or pharmacist. Please keep your medication list with you and share with your provider. Update the information when medications are discontinued, doses are changed, or new medications (including over-the-counter products) are added; and carry medication information at all times in the event of emergency situations     Allergies:  No Known Allergies          Medications  Valid as of: June 21, 2017 - 10:03 AM    Generic Name Brand Name Tablet Size Instructions for use    Ibuprofen (Tab) MOTRIN 800 MG Take 1 Tab by mouth every 8 hours as needed (Cramping).        Prenatal Vit-Fe Fumarate-FA   Take  by mouth.        .                 Medicines prescribed today were sent to:     [facility] [address] [place] [address] AT [facility] & [address] [address]    Phone: [phone] Fax: [phone]    Open 24 Hours?: No      Medication refill instructions:       If your prescription bottle indicates you have medication refills left, it is not necessary to call your provider’s office. Please contact your pharmacy and they will refill your medication.    If your prescription bottle indicates you do not have any refills left, you may request refills at any time through one of the following ways: The online [employer] system (except Urgent Care), by calling your provider’s office, or by asking your pharmacy to contact your provider’s office with a refill  request. Medication refills are processed only during regular business hours and may not be available until the next business day. Your provider may request additional information or to have a follow-up visit with you prior to refilling your medication.   *Please Note: Medication refills are assigned a new Rx number when refilled electronically. Your pharmacy may indicate that no refills were authorized even though a new prescription for the same medication is available at the pharmacy. Please request the medicine by name with the pharmacy before contacting your provider for a refill.        Your To Do List     Future Labs/Procedures Complete By Expires    GLUCOSE 1HR GESTATIONAL  As directed 6/22/2018    PREG CNTR PRENATAL PN  As directed 6/22/2018    THINPREP PAP,REFLEX HPV ON ASC-US ONLY  As directed 6/22/2018    US-OB 2ND 3RD TRI COMPLETE  As directed 12/22/2017         [employer] Access Code: [email or web address]  Expires: 7/19/2017  4:15 AM    [employer]  A secure, online tool to manage your health information     [employer]® is a secure, online tool that connects you to your personalized health information from the privacy of your home -- day or night - making it very easy for you to manage your healthcare. Once the activation process is completed, you can even access your medical information using the [employer] stiven, which is available for free in the Apple Stiven store or Google Play store.     [employer] provides the following levels of access (as shown below):   My Chart Features   [facility] Doctor [facility]  Specialists [facility]  Urgent  Care Non-[facility]  Primary Care  Doctor   Email your healthcare team securely and privately 24/7 X X X    Manage appointments: schedule your next appointment; view details of past/upcoming appointments X      Request prescription refills. X      View recent personal medical records, including lab and immunizations X X X X   View health record, including health history,  allergies, medications X X X X   Read reports about your outpatient visits, procedures, consult and ER notes X X X X   See your discharge summary, which is a recap of your hospital and/or ER visit that includes your diagnosis, lab results, and care plan. X X       How to register for [facility]:  1. Go to  [email or web address].  2. Click on the Sign Up Now box, which takes you to the New Member Sign Up page. You will need to provide the following information:  a. Enter your [employer] Access Code exactly as it appears at the top of this page. (You will not need to use this code after you’ve completed the sign-up process. If you do not sign up before the expiration date, you must request a new code.)   b. Enter your date of birth.   c. Enter your home email address.   d. Click Submit, and follow the next screen’s instructions.  3. Create a GENERAL [employer] ID. This will be your [employer] login ID and cannot be changed, so think of one that is secure and easy to remember.  4. Create a [employer] password. You can change your password at any time.  5. Enter your Password Reset Question and Answer. This can be used at a later time if you forget your password.   6. Enter your e-mail address. This allows you to receive e-mail notifications when new information is available in [employer].  7. Click Sign Up. You can now view your health information.    For assistance activating your [employer] account, call [phone]

## 2017-06-27 ENCOUNTER — APPOINTMENT (OUTPATIENT)
Dept: RADIOLOGY | Facility: IMAGING CENTER | Age: 27
End: 2017-06-27
Attending: NURSE PRACTITIONER

## 2017-06-27 DIAGNOSIS — Z34.80 SUPERVISION OF OTHER NORMAL PREGNANCY, ANTEPARTUM: ICD-10-CM

## 2017-06-27 PROCEDURE — 76805 OB US >/= 14 WKS SNGL FETUS: CPT | Mod: 26 | Performed by: OBSTETRICS & GYNECOLOGY

## 2017-06-28 ENCOUNTER — TELEPHONE (OUTPATIENT)
Dept: OBGYN | Facility: CLINIC | Age: 27
End: 2017-06-28

## 2017-06-28 NOTE — PROGRESS NOTES
Referral faxed to [facility] on 6/28/17  They will contact patient to schedule appt.  Please check with patient if appt was made/ document. Thank you

## 2017-07-03 ENCOUNTER — HOSPITAL ENCOUNTER (OUTPATIENT)
Dept: LAB | Facility: MEDICAL CENTER | Age: 27
End: 2017-07-03
Attending: NURSE PRACTITIONER
Payer: COMMERCIAL

## 2017-07-03 DIAGNOSIS — Z34.80 SUPERVISION OF OTHER NORMAL PREGNANCY, ANTEPARTUM: ICD-10-CM

## 2017-07-03 LAB
ABO GROUP BLD: NORMAL
AMORPH CRY #/AREA URNS HPF: PRESENT /HPF
APPEARANCE UR: ABNORMAL
BACTERIA #/AREA URNS HPF: ABNORMAL /HPF
BASOPHILS # BLD AUTO: 0.4 % (ref 0–1.8)
BASOPHILS # BLD: 0.03 K/UL (ref 0–0.12)
BILIRUB UR QL STRIP.AUTO: NEGATIVE
BLD GP AB SCN SERPL QL: NORMAL
CAOX CRY #/AREA URNS HPF: ABNORMAL /HPF
COLOR UR: YELLOW
CULTURE IF INDICATED INDCX: YES UA CULTURE
EOSINOPHIL # BLD AUTO: 0.09 K/UL (ref 0–0.51)
EOSINOPHIL NFR BLD: 1.1 % (ref 0–6.9)
EPI CELLS #/AREA URNS HPF: ABNORMAL /HPF
ERYTHROCYTE [DISTWIDTH] IN BLOOD BY AUTOMATED COUNT: 42.7 FL (ref 35.9–50)
GLUCOSE 1H P 50 G GLC PO SERPL-MCNC: 142 MG/DL (ref 70–139)
GLUCOSE UR STRIP.AUTO-MCNC: NEGATIVE MG/DL
HBV SURFACE AG SER QL: NEGATIVE
HCT VFR BLD AUTO: 39 % (ref 37–47)
HGB BLD-MCNC: 13.1 G/DL (ref 12–16)
HIV 1+2 AB+HIV1 P24 AG SERPL QL IA: NON REACTIVE
IMM GRANULOCYTES # BLD AUTO: 0.05 K/UL (ref 0–0.11)
IMM GRANULOCYTES NFR BLD AUTO: 0.6 % (ref 0–0.9)
KETONES UR STRIP.AUTO-MCNC: NEGATIVE MG/DL
LEUKOCYTE ESTERASE UR QL STRIP.AUTO: ABNORMAL
LYMPHOCYTES # BLD AUTO: 2.17 K/UL (ref 1–4.8)
LYMPHOCYTES NFR BLD: 26.2 % (ref 22–41)
MCH RBC QN AUTO: 29.9 PG (ref 27–33)
MCHC RBC AUTO-ENTMCNC: 33.6 G/DL (ref 33.6–35)
MCV RBC AUTO: 89 FL (ref 81.4–97.8)
MICRO URNS: ABNORMAL
MONOCYTES # BLD AUTO: 0.57 K/UL (ref 0–0.85)
MONOCYTES NFR BLD AUTO: 6.9 % (ref 0–13.4)
NEUTROPHILS # BLD AUTO: 5.38 K/UL (ref 2–7.15)
NEUTROPHILS NFR BLD: 64.8 % (ref 44–72)
NITRITE UR QL STRIP.AUTO: NEGATIVE
NRBC # BLD AUTO: 0 K/UL
NRBC BLD AUTO-RTO: 0 /100 WBC
PH UR STRIP.AUTO: 6.5 [PH]
PLATELET # BLD AUTO: 192 K/UL (ref 164–446)
PMV BLD AUTO: 11.9 FL (ref 9–12.9)
PROT UR QL STRIP: NEGATIVE MG/DL
RBC # BLD AUTO: 4.38 M/UL (ref 4.2–5.4)
RBC # URNS HPF: ABNORMAL /HPF
RBC UR QL AUTO: NEGATIVE
RH BLD: NORMAL
RUBV AB SER QL: 116.9 IU/ML
SP GR UR STRIP.AUTO: 1.02
TREPONEMA PALLIDUM IGG+IGM AB [PRESENCE] IN SERUM OR PLASMA BY IMMUNOASSAY: NON REACTIVE
WBC # BLD AUTO: 8.3 K/UL (ref 4.8–10.8)
WBC #/AREA URNS HPF: ABNORMAL /HPF

## 2017-07-05 LAB
BACTERIA UR CULT: NORMAL
SIGNIFICANT IND 70042: NORMAL
SOURCE SOURCE: NORMAL

## 2017-07-06 ENCOUNTER — ROUTINE PRENATAL (OUTPATIENT)
Dept: OBGYN | Facility: CLINIC | Age: 27
End: 2017-07-06

## 2017-07-06 VITALS — WEIGHT: 152 LBS | DIASTOLIC BLOOD PRESSURE: 78 MMHG | BODY MASS INDEX: 29.69 KG/M2 | SYSTOLIC BLOOD PRESSURE: 100 MMHG

## 2017-07-06 DIAGNOSIS — B96.89 BV (BACTERIAL VAGINOSIS): ICD-10-CM

## 2017-07-06 DIAGNOSIS — R73.09 ABNORMAL GTT (GLUCOSE TOLERANCE TEST): ICD-10-CM

## 2017-07-06 DIAGNOSIS — N76.0 BV (BACTERIAL VAGINOSIS): ICD-10-CM

## 2017-07-06 DIAGNOSIS — O28.3 ABNORMAL PREGNANCY US: ICD-10-CM

## 2017-07-06 DIAGNOSIS — Z34.83 ENCOUNTER FOR SUPERVISION OF OTHER NORMAL PREGNANCY, THIRD TRIMESTER: Primary | ICD-10-CM

## 2017-07-06 PROCEDURE — 90040 PR PRENATAL FOLLOW UP: CPT | Performed by: NURSE PRACTITIONER

## 2017-07-06 RX ORDER — METRONIDAZOLE 500 MG/1
500 TABLET ORAL EVERY 12 HOURS
Qty: 14 TAB | Refills: 0 | Status: SHIPPED | OUTPATIENT
Start: 2017-07-06 | End: 2017-07-13

## 2017-07-06 NOTE — MR AVS SNAPSHOT
Carolinaadryan Leong   2017 1:15 PM   Routine Prenatal   MRN: [id number]    [facility]   Dept Phone:  [phone]    Description:  Female : 1990   Provider:  Katia Christie C.N.M.           Allergies as of 2017     No Known Allergies      You were diagnosed with     Encounter for supervision of other normal pregnancy, third trimester   [7511062]  -  Primary     Abnormal GTT (glucose tolerance test)   [275733]       BV (bacterial vaginosis)   [950788]         Vital Signs     Blood Pressure Weight Last Menstrual Period Smoking Status          100/78 mmHg 68.947 kg (152 lb) 12/10/2016 Never Smoker         Basic Information     Date Of Birth Sex Race Ethnicity Preferred Language    1990 Female Other  Origin (Gambian,Swazi,Tuvaluan,[place], etc) Gambian      Your appointments     2017  9:30 AM   OB Follow Up with Debora Sandhu D.N.P.   [facility] [address] [address] [phone]              Problem List              ICD-10-CM Priority Class Noted - Resolved    Late prenatal care @ 31 wks  O09.30   2015 - Present    History of  labor with delivery at 35 wks  Z87.51   2015 - Present    History of Gestational diabetes O24.419   2015 - Present    Abnormal GTT (glucose tolerance test) R73.02   2017 - Present    Abnormal pregnancy US O28.3   2017 - Present    Encounter for supervision of other normal pregnancy, third trimester Z34.83   2017 - Present    BV (bacterial vaginosis) N76.0, B96.89   2017 - Present      Health Maintenance        Date Due Completion Dates    IMM HEP B VACCINE (1 of 3 - Primary Series) 1990 ---    IMM HEP A VACCINE (1 of 2 - Standard Series) 1991 ---    IMM VARICELLA (CHICKENPOX) VACCINE (1 of 2 - 2 Dose Adolescent Series) 2003 ---    IMM INFLUENZA (1) 2017 ---    PAP SMEAR 2020, 2/3/2012    IMM DTaP/Tdap/Td  Vaccine (3 - Td) 6/21/2027 6/21/2017, 6/8/2015, 7/9/2014, 6/26/2014            Current Immunizations     Tdap Vaccine 6/21/2017, 6/8/2015  9:15 AM, 7/9/2014  1:05 PM, 6/26/2014      Below and/or attached are the medications your provider expects you to take. Review all of your home medications and newly ordered medications with your provider and/or pharmacist. Follow medication instructions as directed by your provider and/or pharmacist. Please keep your medication list with you and share with your provider. Update the information when medications are discontinued, doses are changed, or new medications (including over-the-counter products) are added; and carry medication information at all times in the event of emergency situations     Allergies:  No Known Allergies          Medications  Valid as of: July 06, 2017 -  1:29 PM    Generic Name Brand Name Tablet Size Instructions for use    MetroNIDAZOLE (Tab) FLAGYL 500 MG Take 1 Tab by mouth every 12 hours for 7 days.        Prenatal Vit-Fe Fumarate-FA   Take  by mouth.        .                 Medicines prescribed today were sent to:     [facility] [address] [place] [address] & [address] [address]    Phone: [phone] Fax: [phone]    Open 24 Hours?: No      Medication refill instructions:       If your prescription bottle indicates you have medication refills left, it is not necessary to call your provider’s office. Please contact your pharmacy and they will refill your medication.    If your prescription bottle indicates you do not have any refills left, you may request refills at any time through one of the following ways: The online MedRunner system (except Urgent Care), by calling your provider’s office, or by asking your pharmacy to contact your provider’s office with a refill request. Medication refills are processed only during regular business hours and may not be available until the next  business day. Your provider may request additional information or to have a follow-up visit with you prior to refilling your medication.   *Please Note: Medication refills are assigned a new Rx number when refilled electronically. Your pharmacy may indicate that no refills were authorized even though a new prescription for the same medication is available at the pharmacy. Please request the medicine by name with the pharmacy before contacting your provider for a refill.        Your To Do List     Future Labs/Procedures Complete By Expires    GLUCOSE 3 HR GESTATIONAL  As directed 1/5/2018      Instructions    P: 1. PP contraception: BTL, consent signed.   2. Continue FKCs.   3. Questions answered.   4. Encouraged pt to tour L&D.   5. Encourage adequate water intake.   6. F/u 2 wks.   7. Tdap already given.   8. PANN appt 7/10/17   9. Rx sent for flagyl.   10. US results reviewed w pt.   11. 3hr GTT ordered.          Other Notes About Your Plan     PNP - wnl, Pap - +BV and +yeast, wnl, US - lobular skull > refer to PANN, 1hr elevated > 3hr GTT ordered           BackerKit Access Code: [email or web address]  Expires: 7/19/2017  4:15 AM    BackerKit  A secure, online tool to manage your health information     The [employer]’s BackerKit® is a secure, online tool that connects you to your personalized health information from the privacy of your home -- day or night - making it very easy for you to manage your healthcare. Once the activation process is completed, you can even access your medical information using the BackerKit stiven, which is available for free in the Apple Stiven store or Google Play store.     BackerKit provides the following levels of access (as shown below):   My Chart Features   [facility] Doctor [facility]  Specialists [facility]  Urgent  Care Non-[facility]  Primary Care  Doctor   Email your healthcare team securely and privately 24/7 X X X    Manage appointments: schedule your next appointment; view details of  past/upcoming appointments X      Request prescription refills. X      View recent personal medical records, including lab and immunizations X X X X   View health record, including health history, allergies, medications X X X X   Read reports about your outpatient visits, procedures, consult and ER notes X X X X   See your discharge summary, which is a recap of your hospital and/or ER visit that includes your diagnosis, lab results, and care plan. X X       How to register for Geolab-IT:  1. Go to  [email or web address].  2. Click on the Sign Up Now box, which takes you to the New Member Sign Up page. You will need to provide the following information:  a. Enter your Geolab-IT Access Code exactly as it appears at the top of this page. (You will not need to use this code after you’ve completed the sign-up process. If you do not sign up before the expiration date, you must request a new code.)   b. Enter your date of birth.   c. Enter your home email address.   d. Click Submit, and follow the next screen’s instructions.  3. Create a Geolab-IT ID. This will be your Geolab-IT login ID and cannot be changed, so think of one that is secure and easy to remember.  4. Create a Geolab-IT password. You can change your password at any time.  5. Enter your Password Reset Question and Answer. This can be used at a later time if you forget your password.   6. Enter your e-mail address. This allows you to receive e-mail notifications when new information is available in Geolab-IT.  7. Click Sign Up. You can now view your health information.    For assistance activating your Geolab-IT account, call [phone]

## 2017-07-06 NOTE — PROGRESS NOTES
OB f/u. + fetal movement.  No VB, LOF or UC's.  Wt:152lb       BP:100/78  Good phone #  [phone]  Preferred pharmacy confirmed.  Patient notified about abnormal 1 GTT and need for 3 GTT, instructions for test given to patient: fasting from 10:00pm night before test ( plain water is ok), call laboratory to schedule appt. Bring something to eat for after test is done. Aware she needs to stay there for the 3 hrs. she agrees to do it ASAP.  PANN appt 7/10/17  BTL  Signed today

## 2017-07-06 NOTE — PROGRESS NOTES
S:  Pt is  at 29w5d for routine OB follow up.  No c/o.  Reports good FM.  Denies VB, LOF, RUCs or vaginal DC.    O:  Please see above vitals.        FHTs: 140        Fundal ht: 29 cm.        1hr GTT: abnormal -- reviewed w pt.        Pap: +BV and +yeast, otherwise WNL    Complete OB US  2017 9:52 AM    HISTORY/REASON FOR EXAM:  Routine screening for abnormality      TECHNIQUE/EXAM DESCRIPTION: OB complete ultrasound.    COMPARISON:  None    FINDINGS:  Fetal Lie:  Vertex  LMP:  12/10/2016  Clinical IRIS by LMP:  2017    Placenta (Location):  Posterior  Placenta Previa: No  Placental ndGndrndanddndend:nd nd2nd Amniotic Fluid Volume:  PETTY = 17.25 cm    Fetal Heart Rate:  141 bpm    Cervical Length:  4.25 cm  Transabdominal    No maternal adnexal mass is identified.    Fetal Anatomy  (Seen or Not Seen)  Lateral Ventricles     Seen  Cisterna Magna        Seen  Cerebellum              Seen  CSP             Seen  Orbits             Seen  Face/Lips                Seen  Cord Insertion         Seen  Placental CI         Seen  4 Chamber Heart     Seen  LVOT               Seen  RVOT              Seen  Stomach       Seen  Kidneys                   Seen  Urinary Bladder      Seen  Spine                       Seen  3 Vessel Cord          Seen  Both Upper Extremities    Seen  Both Lower Extremities    Seen  Diaphragm             Seen  Movement       Seen  Gender:  Likely male    Fetal Biometry  BPD    6.14 cm, 25 weeks, 0 days  HC    25.06 cm, 27 weeks, 1 day  AC    24.20 cm, 28 weeks, 3 days  Femur Length    4.87 cm, 26 weeks, 3 days  Humerus Length    4.20 cm, 25 weeks, 1 day  Cerebellum Diameter   3.25 cm    EGA by this US:  26 weeks, 3 days  IRIS by this US: 2017  IRIS by 1st US:    Estimated Fetal Weight:  1069 grams    Comments:         Impression        1.  Single intrauterine pregnancy of an estimated gestational age of 26 weeks, 3 days with an estimated date of delivery of 2017. Size is less than dates by  approximately 2 weeks.    2.  Lobular appearance of the calvarium with dolichocephaly         A:  IUP at 29w5d  Patient Active Problem List    Diagnosis Date Noted   • Abnormal GTT (glucose tolerance test) 2017   • Abnormal pregnancy US 2017   • Encounter for supervision of other normal pregnancy, third trimester 2017   • BV (bacterial vaginosis) 2017   • History of Gestational diabetes 2015   • Late prenatal care @ 31 wks  2015   • History of  labor with delivery at 35 wks  2015        P:  1.  PP contraception: BTL, consent signed.        2.  Continue FKCs.          3.  Questions answered.          4.  Encouraged pt to tour L&D.          5.  Encourage adequate water intake.        6.  F/u 2 wks.        7.  Tdap already given.          8.  PANN appt 7/10/17        9.  Rx sent for flagyl.      10.  US results reviewed w pt.      11.  3hr GTT ordered.

## 2017-07-06 NOTE — PATIENT INSTRUCTIONS
P: 1. PP contraception: BTL, consent signed.   2. Continue FKCs.   3. Questions answered.   4. Encouraged pt to tour L&D.   5. Encourage adequate water intake.   6. F/u 2 wks.   7. Tdap already given.   8. PANN appt 7/10/17   9. Rx sent for flagyl.   10. US results reviewed w pt.   11. 3hr GTT ordered.

## 2017-07-19 ENCOUNTER — HOSPITAL ENCOUNTER (OUTPATIENT)
Dept: LAB | Facility: MEDICAL CENTER | Age: 27
End: 2017-07-19
Attending: NURSE PRACTITIONER
Payer: COMMERCIAL

## 2017-07-19 DIAGNOSIS — Z34.83 ENCOUNTER FOR SUPERVISION OF OTHER NORMAL PREGNANCY, THIRD TRIMESTER: ICD-10-CM

## 2017-07-19 DIAGNOSIS — R73.09 ABNORMAL GTT (GLUCOSE TOLERANCE TEST): ICD-10-CM

## 2017-07-19 PROBLEM — R73.02 GLUCOSE INTOLERANCE (IMPAIRED GLUCOSE TOLERANCE): Status: ACTIVE | Noted: 2017-07-19

## 2017-07-19 LAB
GLUCOSE 1H P CHAL SERPL-MCNC: 145 MG/DL (ref 65–180)
GLUCOSE 2H P CHAL SERPL-MCNC: 123 MG/DL (ref 65–155)
GLUCOSE 3H P CHAL SERPL-MCNC: 107 MG/DL (ref 65–140)
GLUCOSE BS SERPL-MCNC: 98 MG/DL (ref 65–95)

## 2017-07-31 ENCOUNTER — ROUTINE PRENATAL (OUTPATIENT)
Dept: OBGYN | Facility: CLINIC | Age: 27
End: 2017-07-31

## 2017-07-31 VITALS — BODY MASS INDEX: 30.08 KG/M2 | SYSTOLIC BLOOD PRESSURE: 102 MMHG | DIASTOLIC BLOOD PRESSURE: 72 MMHG | WEIGHT: 154 LBS

## 2017-07-31 DIAGNOSIS — R73.02 GLUCOSE INTOLERANCE (IMPAIRED GLUCOSE TOLERANCE): ICD-10-CM

## 2017-07-31 DIAGNOSIS — B96.89 BV (BACTERIAL VAGINOSIS): ICD-10-CM

## 2017-07-31 DIAGNOSIS — O28.3 ABNORMAL PREGNANCY US: ICD-10-CM

## 2017-07-31 DIAGNOSIS — N76.0 BV (BACTERIAL VAGINOSIS): ICD-10-CM

## 2017-07-31 PROCEDURE — 90040 PR PRENATAL FOLLOW UP: CPT | Performed by: NURSE PRACTITIONER

## 2017-07-31 NOTE — PROGRESS NOTES
S) Pt is a 27 y.o.   at 33w2d  gestation. Routine prenatal care today. States no problems today.  Reports good  fetal movement. Denies cramping, bleeding or leaking of fluid. Denies dysuria. Generally feels well today. Good self-care activities identified. Denies headaches.     O) see flow         Filed Vitals:    17 0807   BP: 102/72   Weight: 69.854 kg (154 lb)           Lab:  Recent Results (from the past 336 hour(s))   GLUCOSE 3 HR GESTATIONAL    Collection Time: 17  7:10 AM   Result Value Ref Range    Glucose 3 Hour 107 65 - 140 mg/dL    Glucose 2 Hour 123 65 - 155 mg/dL    Baseline Glucose 98 (H) 65 - 95 mg/dL    Glucose 1 Hour 145 65 - 180 mg/dL          Pertinent ultrasound - Normal fetal survey PANN           DEBRA) IUP at 33w2d       S=D         Patient Active Problem List    Diagnosis Date Noted   • Glucose intolerance (impaired glucose tolerance) 2017     Priority: High     Class: Acute   • Abnormal GTT (glucose tolerance test) 2017   • Abnormal pregnancy US 2017   • Encounter for supervision of other normal pregnancy, third trimester 2017   • BV (bacterial vaginosis) 2017   • History of Gestational diabetes 2015   • Late prenatal care @ 31 wks  2015   • History of  labor with delivery at 35 wks  2015       P) s/s ptl vs general discomforts. Fetal movements reviewed. General ed and anticipatory guidance. Nutrition/exercise/vitamin. Continue PNV. Has another growth scan scheduled with MARCIE

## 2017-07-31 NOTE — PROGRESS NOTES
Ob f/u. + fetal movement   No VB, LOF or contractions   C/O Pelvic pain x 1 week   Phone number  [phone]  Pharmacy verified with patient  WT=  154lbs           [id number]  08/14/2017 for PANN something abnormal or they cannot see on the baby`s head

## 2017-07-31 NOTE — MR AVS SNAPSHOT
Carolina Mio Leong   2017 8:30 AM   Routine Prenatal   MRN: [id number]    [facility]   Dept Phone:  [phone]    Description:  Female : 1990   Provider:  Debora Sandhu D.N.P.           Allergies as of 2017     No Known Allergies      You were diagnosed with     Glucose intolerance (impaired glucose tolerance)   [479776]       Abnormal pregnancy US   [022133]       BV (bacterial vaginosis)   [167318]         Vital Signs     Blood Pressure Weight Last Menstrual Period Smoking Status          102/72 mmHg 69.854 kg (154 lb) 12/10/2016 Never Smoker         Basic Information     Date Of Birth Sex Race Ethnicity Preferred Language    1990 Female Other  Origin ([place],Moldovan,Guyanese,Cambodian, etc) [place]      Your appointments     2017  8:30 AM   OB Follow Up with Debora Sandhu D.N.P.   [facility] [address] [address] [phone]              Problem List              ICD-10-CM Priority Class Noted - Resolved    Late prenatal care @ 31 wks  O09.30   2015 - Present    History of  labor with delivery at 35 wks  Z87.51   2015 - Present    History of Gestational diabetes O24.419   2015 - Present    Abnormal GTT (glucose tolerance test) R73.02   2017 - Present    Abnormal pregnancy US O28.3   2017 - Present    Encounter for supervision of other normal pregnancy, third trimester Z34.83   2017 - Present    BV (bacterial vaginosis) N76.0, B96.89   2017 - Present    Glucose intolerance (impaired glucose tolerance) R73.02 High Acute 2017 - Present      Health Maintenance        Date Due Completion Dates    IMM HEP B VACCINE (1 of 3 - Primary Series) 1990 ---    IMM HEP A VACCINE (1 of 2 - Standard Series) 1991 ---    IMM VARICELLA (CHICKENPOX) VACCINE (1 of 2 - 2 Dose Adolescent Series) 2003 ---    IMM INFLUENZA (1) 2017 ---    PAP SMEAR 2020  6/21/2017, 2/3/2012    IMM DTaP/Tdap/Td Vaccine (3 - Td) 6/21/2027 6/21/2017, 6/8/2015, 7/9/2014, 6/26/2014            Current Immunizations     Tdap Vaccine 6/21/2017, 6/8/2015  9:15 AM, 7/9/2014  1:05 PM, 6/26/2014      Below and/or attached are the medications your provider expects you to take. Review all of your home medications and newly ordered medications with your provider and/or pharmacist. Follow medication instructions as directed by your provider and/or pharmacist. Please keep your medication list with you and share with your provider. Update the information when medications are discontinued, doses are changed, or new medications (including over-the-counter products) are added; and carry medication information at all times in the event of emergency situations     Allergies:  No Known Allergies          Medications  Valid as of: July 31, 2017 -  8:14 AM    Generic Name Brand Name Tablet Size Instructions for use    Prenatal Vit-Fe Fumarate-FA   Take  by mouth.        .                 Medicines prescribed today were sent to:     [facility] [address] [place] [address] & [address] [address]    Phone: [phone] Fax: [phone]    Open 24 Hours?: No      Medication refill instructions:       If your prescription bottle indicates you have medication refills left, it is not necessary to call your provider’s office. Please contact your pharmacy and they will refill your medication.    If your prescription bottle indicates you do not have any refills left, you may request refills at any time through one of the following ways: The online [employer] system (except Urgent Care), by calling your provider’s office, or by asking your pharmacy to contact your provider’s office with a refill request. Medication refills are processed only during regular business hours and may not be available until the next business day. Your provider may request  additional information or to have a follow-up visit with you prior to refilling your medication.   *Please Note: Medication refills are assigned a new Rx number when refilled electronically. Your pharmacy may indicate that no refills were authorized even though a new prescription for the same medication is available at the pharmacy. Please request the medicine by name with the pharmacy before contacting your provider for a refill.        Other Notes About Your Plan     PNP - wnl, Pap - +BV and +yeast, wnl, US - lobular skull > refer to PANN, 1hr elevated > 3hr GTT normal           Family Nation Access Code: [email or web address]  Expires: 8/17/2017  4:10 AM    Family Nation  A secure, online tool to manage your health information     [employer]’s Family Nation® is a secure, online tool that connects you to your personalized health information from the privacy of your home -- day or night - making it very easy for you to manage your healthcare. Once the activation process is completed, you can even access your medical information using the Family Nation stiven, which is available for free in the Apple Stiven store or Google Play store.     Family Nation provides the following levels of access (as shown below):   My Chart Features   [facility] Doctor [facility]  Specialists [facility]  Urgent  Care Non-[facility]  Primary Care  Doctor   Email your healthcare team securely and privately 24/7 X X X    Manage appointments: schedule your next appointment; view details of past/upcoming appointments X      Request prescription refills. X      View recent personal medical records, including lab and immunizations X X X X   View health record, including health history, allergies, medications X X X X   Read reports about your outpatient visits, procedures, consult and ER notes X X X X   See your discharge summary, which is a recap of your hospital and/or ER visit that includes your diagnosis, lab results, and care plan. X X       How to register for Family Nation:  1. Go  to  [email or web address].  2. Click on the Sign Up Now box, which takes you to the New Member Sign Up page. You will need to provide the following information:  a. Enter your OrderMotion Access Code exactly as it appears at the top of this page. (You will not need to use this code after you’ve completed the sign-up process. If you do not sign up before the expiration date, you must request a new code.)   b. Enter your date of birth.   c. Enter your home email address.   d. Click Submit, and follow the next screen’s instructions.  3. Create a OrderMotion ID. This will be your OrderMotion login ID and cannot be changed, so think of one that is secure and easy to remember.  4. Create a Qui.ltt password. You can change your password at any time.  5. Enter your Password Reset Question and Answer. This can be used at a later time if you forget your password.   6. Enter your e-mail address. This allows you to receive e-mail notifications when new information is available in OrderMotion.  7. Click Sign Up. You can now view your health information.    For assistance activating your OrderMotion account, call [phone]

## 2017-09-21 ENCOUNTER — HOSPITAL ENCOUNTER (OUTPATIENT)
Facility: MEDICAL CENTER | Age: 27
End: 2017-09-21
Attending: NURSE PRACTITIONER
Payer: COMMERCIAL

## 2017-09-21 ENCOUNTER — ROUTINE PRENATAL (OUTPATIENT)
Dept: OBGYN | Facility: CLINIC | Age: 27
End: 2017-09-21

## 2017-09-21 VITALS — WEIGHT: 164 LBS | DIASTOLIC BLOOD PRESSURE: 64 MMHG | BODY MASS INDEX: 32.03 KG/M2 | SYSTOLIC BLOOD PRESSURE: 104 MMHG

## 2017-09-21 DIAGNOSIS — N76.0 BV (BACTERIAL VAGINOSIS): ICD-10-CM

## 2017-09-21 DIAGNOSIS — O28.3 ABNORMAL PREGNANCY US: ICD-10-CM

## 2017-09-21 DIAGNOSIS — O09.30 LATE PRENATAL CARE: ICD-10-CM

## 2017-09-21 DIAGNOSIS — R73.02 GLUCOSE INTOLERANCE (IMPAIRED GLUCOSE TOLERANCE): ICD-10-CM

## 2017-09-21 DIAGNOSIS — B96.89 BV (BACTERIAL VAGINOSIS): ICD-10-CM

## 2017-09-21 PROCEDURE — 90040 PR PRENATAL FOLLOW UP: CPT | Performed by: NURSE PRACTITIONER

## 2017-09-21 NOTE — PROGRESS NOTES
SUBJECTIVE:  Pt is a 27 y.o.   at 40w5d  gestation. Presents today for follow-up prenatal care. Reports no issues at this time.  Reports good  fetal movement. Denies cramping/contractions, bleeding or leaking of fluid. Denies dysuria, headaches, N/V, or other issues at this time. Generally feels well today. Pt hasnt been seen for about 7 weeks.     OBJECTIVE:  - See prenatal vitals flow  -   Vitals:    17 1344   BP: 104/64   Weight: 74.4 kg (164 lb)           ASSESSMENT:   - IUP at 40w5d by LMP which is consistent with week US   - S=D   -   Patient Active Problem List    Diagnosis Date Noted   • Glucose intolerance (impaired glucose tolerance) 2017     Priority: High     Class: Acute   • Abnormal GTT (glucose tolerance test) 2017   • Abnormal pregnancy US 2017   • Encounter for supervision of other normal pregnancy, third trimester 2017   • BV (bacterial vaginosis) 2017   • History of Gestational diabetes 2015   • Late prenatal care @ 31 wks  2015   • History of  labor with delivery at 35 wks  2015         PLAN:  - GBS collected today   - IOL scheduled for 9am 17 as per direct call to LnD  - S/sx pregnancy and labor warning signs vs general discomforts discussed  - Fetal movements and kick counts reviewed   - Adequate hydration reinforced  - Nutrition/exercise/vitamin education: continued PNV  - S/p TDAP   - Anticipatory guidance given  - RTC PRN

## 2017-09-22 ENCOUNTER — HOSPITAL ENCOUNTER (INPATIENT)
Facility: MEDICAL CENTER | Age: 27
LOS: 2 days | End: 2017-09-24
Attending: OBSTETRICS & GYNECOLOGY | Admitting: OBSTETRICS & GYNECOLOGY
Payer: MEDICAID

## 2017-09-22 PROBLEM — Z91.199 NON-COMPLIANT PATIENT: Status: ACTIVE | Noted: 2017-09-22

## 2017-09-22 LAB
BASOPHILS # BLD AUTO: 0.3 % (ref 0–1.8)
BASOPHILS # BLD: 0.02 K/UL (ref 0–0.12)
EOSINOPHIL # BLD AUTO: 0.03 K/UL (ref 0–0.51)
EOSINOPHIL NFR BLD: 0.4 % (ref 0–6.9)
ERYTHROCYTE [DISTWIDTH] IN BLOOD BY AUTOMATED COUNT: 41.9 FL (ref 35.9–50)
GLUCOSE BLD-MCNC: 70 MG/DL (ref 65–99)
HCT VFR BLD AUTO: 40.2 % (ref 37–47)
HGB BLD-MCNC: 13.7 G/DL (ref 12–16)
HOLDING TUBE BB 8507: NORMAL
IMM GRANULOCYTES # BLD AUTO: 0.03 K/UL (ref 0–0.11)
IMM GRANULOCYTES NFR BLD AUTO: 0.4 % (ref 0–0.9)
LYMPHOCYTES # BLD AUTO: 1.41 K/UL (ref 1–4.8)
LYMPHOCYTES NFR BLD: 19.7 % (ref 22–41)
MCH RBC QN AUTO: 29.8 PG (ref 27–33)
MCHC RBC AUTO-ENTMCNC: 34.1 G/DL (ref 33.6–35)
MCV RBC AUTO: 87.4 FL (ref 81.4–97.8)
MONOCYTES # BLD AUTO: 0.62 K/UL (ref 0–0.85)
MONOCYTES NFR BLD AUTO: 8.7 % (ref 0–13.4)
NEUTROPHILS # BLD AUTO: 5.03 K/UL (ref 2–7.15)
NEUTROPHILS NFR BLD: 70.5 % (ref 44–72)
NRBC # BLD AUTO: 0 K/UL
NRBC BLD AUTO-RTO: 0 /100 WBC
PLATELET # BLD AUTO: 128 K/UL (ref 164–446)
PMV BLD AUTO: 13.2 FL (ref 9–12.9)
RBC # BLD AUTO: 4.6 M/UL (ref 4.2–5.4)
WBC # BLD AUTO: 7.1 K/UL (ref 4.8–10.8)

## 2017-09-22 PROCEDURE — 3E033VJ INTRODUCTION OF OTHER HORMONE INTO PERIPHERAL VEIN, PERCUTANEOUS APPROACH: ICD-10-PCS | Performed by: OBSTETRICS & GYNECOLOGY

## 2017-09-22 PROCEDURE — 10H07YZ INSERTION OF OTHER DEVICE INTO PRODUCTS OF CONCEPTION, VIA NATURAL OR ARTIFICIAL OPENING: ICD-10-PCS | Performed by: OBSTETRICS & GYNECOLOGY

## 2017-09-22 PROCEDURE — 85025 COMPLETE CBC W/AUTO DIFF WBC: CPT

## 2017-09-22 PROCEDURE — 700105 HCHG RX REV CODE 258: Performed by: OBSTETRICS & GYNECOLOGY

## 2017-09-22 PROCEDURE — 36415 COLL VENOUS BLD VENIPUNCTURE: CPT

## 2017-09-22 PROCEDURE — 303615 HCHG EPIDURAL/SPINAL ANESTHESIA FOR LABOR

## 2017-09-22 PROCEDURE — 82962 GLUCOSE BLOOD TEST: CPT

## 2017-09-22 PROCEDURE — 700111 HCHG RX REV CODE 636 W/ 250 OVERRIDE (IP)

## 2017-09-22 PROCEDURE — 770002 HCHG ROOM/CARE - OB PRIVATE (112)

## 2017-09-22 PROCEDURE — 4A1HXCZ MONITORING OF PRODUCTS OF CONCEPTION, CARDIAC RATE, EXTERNAL APPROACH: ICD-10-PCS | Performed by: OBSTETRICS & GYNECOLOGY

## 2017-09-22 PROCEDURE — 700105 HCHG RX REV CODE 258

## 2017-09-22 PROCEDURE — 700111 HCHG RX REV CODE 636 W/ 250 OVERRIDE (IP): Performed by: OBSTETRICS & GYNECOLOGY

## 2017-09-22 RX ORDER — OXYCODONE HYDROCHLORIDE AND ACETAMINOPHEN 5; 325 MG/1; MG/1
2 TABLET ORAL EVERY 4 HOURS PRN
Status: CANCELLED | OUTPATIENT
Start: 2017-09-22

## 2017-09-22 RX ORDER — DIPHENHYDRAMINE HCL 25 MG
25 TABLET ORAL EVERY 6 HOURS PRN
Status: CANCELLED | OUTPATIENT
Start: 2017-09-22

## 2017-09-22 RX ORDER — SIMETHICONE 80 MG
80 TABLET,CHEWABLE ORAL 4 TIMES DAILY PRN
Status: CANCELLED | OUTPATIENT
Start: 2017-09-22

## 2017-09-22 RX ORDER — MORPHINE SULFATE 4 MG/ML
4 INJECTION, SOLUTION INTRAMUSCULAR; INTRAVENOUS
Status: CANCELLED | OUTPATIENT
Start: 2017-09-22

## 2017-09-22 RX ORDER — SODIUM CHLORIDE, SODIUM LACTATE, POTASSIUM CHLORIDE, CALCIUM CHLORIDE 600; 310; 30; 20 MG/100ML; MG/100ML; MG/100ML; MG/100ML
INJECTION, SOLUTION INTRAVENOUS PRN
Status: CANCELLED | OUTPATIENT
Start: 2017-09-22

## 2017-09-22 RX ORDER — DIPHENHYDRAMINE HYDROCHLORIDE 50 MG/ML
25 INJECTION INTRAMUSCULAR; INTRAVENOUS EVERY 6 HOURS PRN
Status: CANCELLED | OUTPATIENT
Start: 2017-09-22

## 2017-09-22 RX ORDER — OXYCODONE HYDROCHLORIDE AND ACETAMINOPHEN 5; 325 MG/1; MG/1
1 TABLET ORAL EVERY 4 HOURS PRN
Status: CANCELLED | OUTPATIENT
Start: 2017-09-22

## 2017-09-22 RX ORDER — ROPIVACAINE HYDROCHLORIDE 2 MG/ML
INJECTION, SOLUTION EPIDURAL; INFILTRATION; PERINEURAL
Status: COMPLETED
Start: 2017-09-22 | End: 2017-09-22

## 2017-09-22 RX ORDER — MISOPROSTOL 200 UG/1
800 TABLET ORAL
Status: DISCONTINUED | OUTPATIENT
Start: 2017-09-22 | End: 2017-09-23 | Stop reason: HOSPADM

## 2017-09-22 RX ORDER — PENICILLIN G POTASSIUM 5000000 [IU]/1
5 INJECTION, POWDER, FOR SOLUTION INTRAMUSCULAR; INTRAVENOUS ONCE
Status: COMPLETED | OUTPATIENT
Start: 2017-09-22 | End: 2017-09-22

## 2017-09-22 RX ORDER — DOCUSATE SODIUM 100 MG/1
100 CAPSULE, LIQUID FILLED ORAL 2 TIMES DAILY PRN
Status: CANCELLED | OUTPATIENT
Start: 2017-09-22

## 2017-09-22 RX ORDER — ONDANSETRON 4 MG/1
4 TABLET, ORALLY DISINTEGRATING ORAL EVERY 6 HOURS PRN
Status: CANCELLED | OUTPATIENT
Start: 2017-09-22

## 2017-09-22 RX ORDER — ONDANSETRON 2 MG/ML
4 INJECTION INTRAMUSCULAR; INTRAVENOUS EVERY 6 HOURS PRN
Status: CANCELLED | OUTPATIENT
Start: 2017-09-22

## 2017-09-22 RX ORDER — SODIUM CHLORIDE, SODIUM LACTATE, POTASSIUM CHLORIDE, CALCIUM CHLORIDE 600; 310; 30; 20 MG/100ML; MG/100ML; MG/100ML; MG/100ML
INJECTION, SOLUTION INTRAVENOUS CONTINUOUS
Status: DISPENSED | OUTPATIENT
Start: 2017-09-22 | End: 2017-09-23

## 2017-09-22 RX ORDER — ONDANSETRON 4 MG/1
4 TABLET, ORALLY DISINTEGRATING ORAL EVERY 6 HOURS PRN
OUTPATIENT
Start: 2017-09-22

## 2017-09-22 RX ORDER — SODIUM CHLORIDE 9 MG/ML
INJECTION, SOLUTION INTRAVENOUS
Status: COMPLETED
Start: 2017-09-22 | End: 2017-09-23

## 2017-09-22 RX ORDER — CITRIC ACID/SODIUM CITRATE 334-500MG
30 SOLUTION, ORAL ORAL EVERY 6 HOURS PRN
Status: DISCONTINUED | OUTPATIENT
Start: 2017-09-22 | End: 2017-09-23 | Stop reason: HOSPADM

## 2017-09-22 RX ORDER — IBUPROFEN 600 MG/1
600 TABLET ORAL EVERY 6 HOURS PRN
Status: CANCELLED | OUTPATIENT
Start: 2017-09-22

## 2017-09-22 RX ORDER — ONDANSETRON 2 MG/ML
4 INJECTION INTRAMUSCULAR; INTRAVENOUS EVERY 6 HOURS PRN
OUTPATIENT
Start: 2017-09-22

## 2017-09-22 RX ORDER — MISOPROSTOL 200 UG/1
800 TABLET ORAL
Status: CANCELLED | OUTPATIENT
Start: 2017-09-22

## 2017-09-22 RX ADMIN — SODIUM CHLORIDE 2.5 MILLION UNITS: 9 INJECTION, SOLUTION INTRAVENOUS at 22:00

## 2017-09-22 RX ADMIN — SODIUM CHLORIDE, POTASSIUM CHLORIDE, SODIUM LACTATE AND CALCIUM CHLORIDE: 600; 310; 30; 20 INJECTION, SOLUTION INTRAVENOUS at 20:20

## 2017-09-22 RX ADMIN — OXYTOCIN 2 MILLI-UNITS/MIN: 10 INJECTION, SOLUTION INTRAMUSCULAR; INTRAVENOUS at 18:02

## 2017-09-22 RX ADMIN — PENICILLIN G POTASSIUM 5 MILLION UNITS: 5000000 POWDER, FOR SOLUTION INTRAMUSCULAR; INTRAPLEURAL; INTRATHECAL; INTRAVENOUS at 18:02

## 2017-09-22 RX ADMIN — SODIUM CHLORIDE, POTASSIUM CHLORIDE, SODIUM LACTATE AND CALCIUM CHLORIDE: 600; 310; 30; 20 INJECTION, SOLUTION INTRAVENOUS at 17:50

## 2017-09-22 RX ADMIN — SODIUM CHLORIDE 100 ML: 9 INJECTION, SOLUTION INTRAVENOUS at 18:10

## 2017-09-22 RX ADMIN — ROPIVACAINE HYDROCHLORIDE 100 ML: 2 INJECTION, SOLUTION EPIDURAL; INFILTRATION at 20:41

## 2017-09-22 ASSESSMENT — PATIENT HEALTH QUESTIONNAIRE - PHQ9
SUM OF ALL RESPONSES TO PHQ9 QUESTIONS 1 AND 2: 0
1. LITTLE INTEREST OR PLEASURE IN DOING THINGS: NOT AT ALL
SUM OF ALL RESPONSES TO PHQ QUESTIONS 1-9: 0
2. FEELING DOWN, DEPRESSED, IRRITABLE, OR HOPELESS: NOT AT ALL

## 2017-09-22 ASSESSMENT — COPD QUESTIONNAIRES
DURING THE PAST 4 WEEKS HOW MUCH DID YOU FEEL SHORT OF BREATH: NONE/LITTLE OF THE TIME
HAVE YOU SMOKED AT LEAST 100 CIGARETTES IN YOUR ENTIRE LIFE: NO/DON'T KNOW
COPD SCREENING SCORE: 0
DO YOU EVER COUGH UP ANY MUCUS OR PHLEGM?: NO/ONLY WITH OCCASIONAL COLDS OR INFECTIONS

## 2017-09-22 ASSESSMENT — LIFESTYLE VARIABLES
EVER_SMOKED: NEVER
DO YOU DRINK ALCOHOL: NO
DO YOU DRINK ALCOHOL: NO
ALCOHOL_USE: NO

## 2017-09-22 NOTE — PROGRESS NOTES
; EDC ; EGA 40.6    1512 - Pt arrived to L&D for IOL - post dates. Pt to S 226  1535 - EFM applied x 2.   1615 - IV started, labs sent.

## 2017-09-22 NOTE — PROGRESS NOTES
Patient here for IOL.   called for admission, [id number].  Admission done, IV started,labs sent.  Dr Daria Wilson notified, patient 3/60/-2.  Pitocin to be started.  Started pitocin at 1800.  Report given to Rosalinda/Erin MANDEL.

## 2017-09-23 LAB
ERYTHROCYTE [DISTWIDTH] IN BLOOD BY AUTOMATED COUNT: 41.5 FL (ref 35.9–50)
GP B STREP DNA SPEC QL NAA+PROBE: NEGATIVE
HCT VFR BLD AUTO: 38.1 % (ref 37–47)
HGB BLD-MCNC: 13 G/DL (ref 12–16)
MCH RBC QN AUTO: 30 PG (ref 27–33)
MCHC RBC AUTO-ENTMCNC: 34.1 G/DL (ref 33.6–35)
MCV RBC AUTO: 88 FL (ref 81.4–97.8)
PLATELET # BLD AUTO: 116 K/UL (ref 164–446)
PMV BLD AUTO: 12.8 FL (ref 9–12.9)
RBC # BLD AUTO: 4.33 M/UL (ref 4.2–5.4)
WBC # BLD AUTO: 10.2 K/UL (ref 4.8–10.8)

## 2017-09-23 PROCEDURE — 700105 HCHG RX REV CODE 258: Performed by: OBSTETRICS & GYNECOLOGY

## 2017-09-23 PROCEDURE — 90686 IIV4 VACC NO PRSV 0.5 ML IM: CPT | Performed by: OBSTETRICS & GYNECOLOGY

## 2017-09-23 PROCEDURE — 770002 HCHG ROOM/CARE - OB PRIVATE (112)

## 2017-09-23 PROCEDURE — 36415 COLL VENOUS BLD VENIPUNCTURE: CPT

## 2017-09-23 PROCEDURE — A9270 NON-COVERED ITEM OR SERVICE: HCPCS | Performed by: OBSTETRICS & GYNECOLOGY

## 2017-09-23 PROCEDURE — 700111 HCHG RX REV CODE 636 W/ 250 OVERRIDE (IP): Performed by: OBSTETRICS & GYNECOLOGY

## 2017-09-23 PROCEDURE — 3E0234Z INTRODUCTION OF SERUM, TOXOID AND VACCINE INTO MUSCLE, PERCUTANEOUS APPROACH: ICD-10-PCS | Performed by: OBSTETRICS & GYNECOLOGY

## 2017-09-23 PROCEDURE — 59409 OBSTETRICAL CARE: CPT

## 2017-09-23 PROCEDURE — 85027 COMPLETE CBC AUTOMATED: CPT

## 2017-09-23 PROCEDURE — 700102 HCHG RX REV CODE 250 W/ 637 OVERRIDE(OP): Performed by: OBSTETRICS & GYNECOLOGY

## 2017-09-23 PROCEDURE — 90471 IMMUNIZATION ADMIN: CPT

## 2017-09-23 PROCEDURE — 304965 HCHG RECOVERY SERVICES

## 2017-09-23 RX ORDER — SODIUM CHLORIDE, SODIUM LACTATE, POTASSIUM CHLORIDE, CALCIUM CHLORIDE 600; 310; 30; 20 MG/100ML; MG/100ML; MG/100ML; MG/100ML
INJECTION, SOLUTION INTRAVENOUS PRN
Status: DISCONTINUED | OUTPATIENT
Start: 2017-09-23 | End: 2017-09-24 | Stop reason: HOSPADM

## 2017-09-23 RX ORDER — OXYCODONE HYDROCHLORIDE AND ACETAMINOPHEN 5; 325 MG/1; MG/1
1 TABLET ORAL EVERY 4 HOURS PRN
Status: DISCONTINUED | OUTPATIENT
Start: 2017-09-23 | End: 2017-09-24 | Stop reason: HOSPADM

## 2017-09-23 RX ORDER — OXYCODONE HYDROCHLORIDE AND ACETAMINOPHEN 5; 325 MG/1; MG/1
2 TABLET ORAL EVERY 4 HOURS PRN
Status: DISCONTINUED | OUTPATIENT
Start: 2017-09-23 | End: 2017-09-24 | Stop reason: HOSPADM

## 2017-09-23 RX ORDER — VITAMIN A ACETATE, BETA CAROTENE, ASCORBIC ACID, CHOLECALCIFEROL, .ALPHA.-TOCOPHEROL ACETATE, DL-, THIAMINE MONONITRATE, RIBOFLAVIN, NIACINAMIDE, PYRIDOXINE HYDROCHLORIDE, FOLIC ACID, CYANOCOBALAMIN, CALCIUM CARBONATE, FERROUS FUMARATE, ZINC OXIDE, CUPRIC OXIDE 3080; 12; 120; 400; 1; 1.84; 3; 20; 22; 920; 25; 200; 27; 10; 2 [IU]/1; UG/1; MG/1; [IU]/1; MG/1; MG/1; MG/1; MG/1; MG/1; [IU]/1; MG/1; MG/1; MG/1; MG/1; MG/1
1 TABLET, FILM COATED ORAL EVERY MORNING
Status: DISCONTINUED | OUTPATIENT
Start: 2017-09-23 | End: 2017-09-23

## 2017-09-23 RX ORDER — MISOPROSTOL 200 UG/1
600 TABLET ORAL
Status: DISCONTINUED | OUTPATIENT
Start: 2017-09-23 | End: 2017-09-24 | Stop reason: HOSPADM

## 2017-09-23 RX ORDER — IBUPROFEN 600 MG/1
600 TABLET ORAL EVERY 6 HOURS PRN
Status: DISCONTINUED | OUTPATIENT
Start: 2017-09-23 | End: 2017-09-24 | Stop reason: HOSPADM

## 2017-09-23 RX ORDER — DOCUSATE SODIUM 100 MG/1
100 CAPSULE, LIQUID FILLED ORAL 2 TIMES DAILY PRN
Status: DISCONTINUED | OUTPATIENT
Start: 2017-09-23 | End: 2017-09-24 | Stop reason: HOSPADM

## 2017-09-23 RX ORDER — METHYLERGONOVINE MALEATE 0.2 MG/ML
0.2 INJECTION INTRAVENOUS
Status: DISCONTINUED | OUTPATIENT
Start: 2017-09-23 | End: 2017-09-24 | Stop reason: HOSPADM

## 2017-09-23 RX ORDER — VITAMIN A ACETATE, BETA CAROTENE, ASCORBIC ACID, CHOLECALCIFEROL, .ALPHA.-TOCOPHEROL ACETATE, DL-, THIAMINE MONONITRATE, RIBOFLAVIN, NIACINAMIDE, PYRIDOXINE HYDROCHLORIDE, FOLIC ACID, CYANOCOBALAMIN, CALCIUM CARBONATE, FERROUS FUMARATE, ZINC OXIDE, CUPRIC OXIDE 3080; 12; 120; 400; 1; 1.84; 3; 20; 22; 920; 25; 200; 27; 10; 2 [IU]/1; UG/1; MG/1; [IU]/1; MG/1; MG/1; MG/1; MG/1; MG/1; [IU]/1; MG/1; MG/1; MG/1; MG/1; MG/1
1 TABLET, FILM COATED ORAL EVERY MORNING
Status: DISCONTINUED | OUTPATIENT
Start: 2017-09-23 | End: 2017-09-24 | Stop reason: HOSPADM

## 2017-09-23 RX ORDER — SODIUM CHLORIDE, SODIUM LACTATE, POTASSIUM CHLORIDE, CALCIUM CHLORIDE 600; 310; 30; 20 MG/100ML; MG/100ML; MG/100ML; MG/100ML
INJECTION, SOLUTION INTRAVENOUS PRN
Status: DISCONTINUED | OUTPATIENT
Start: 2017-09-23 | End: 2017-09-23

## 2017-09-23 RX ORDER — MISOPROSTOL 200 UG/1
800 TABLET ORAL
Status: DISCONTINUED | OUTPATIENT
Start: 2017-09-23 | End: 2017-09-23

## 2017-09-23 RX ORDER — DOCUSATE SODIUM 100 MG/1
100 CAPSULE, LIQUID FILLED ORAL 2 TIMES DAILY PRN
Status: DISCONTINUED | OUTPATIENT
Start: 2017-09-23 | End: 2017-09-23

## 2017-09-23 RX ORDER — CARBOPROST TROMETHAMINE 250 UG/ML
250 INJECTION, SOLUTION INTRAMUSCULAR
Status: DISCONTINUED | OUTPATIENT
Start: 2017-09-23 | End: 2017-09-24 | Stop reason: HOSPADM

## 2017-09-23 RX ADMIN — OXYCODONE HYDROCHLORIDE AND ACETAMINOPHEN 1 TABLET: 5; 325 TABLET ORAL at 08:20

## 2017-09-23 RX ADMIN — IBUPROFEN 600 MG: 600 TABLET, FILM COATED ORAL at 05:02

## 2017-09-23 RX ADMIN — IBUPROFEN 600 MG: 600 TABLET, FILM COATED ORAL at 19:49

## 2017-09-23 RX ADMIN — INFLUENZA A VIRUS A/MICHIGAN/45/2015 X-275 (H1N1) ANTIGEN (FORMALDEHYDE INACTIVATED), INFLUENZA A VIRUS A/HONG KONG/4801/2014 X-263B (H3N2) ANTIGEN (FORMALDEHYDE INACTIVATED), INFLUENZA B VIRUS B/PHUKET/3073/2013 ANTIGEN (FORMALDEHYDE INACTIVATED), AND INFLUENZA B VIRUS B/BRISBANE/60/2008 ANTIGEN (FORMALDEHYDE INACTIVATED) 0.5 ML: 15; 15; 15; 15 INJECTION, SUSPENSION INTRAMUSCULAR at 06:31

## 2017-09-23 RX ADMIN — OXYTOCIN 41.67 MILLI-UNITS/MIN: 10 INJECTION, SOLUTION INTRAMUSCULAR; INTRAVENOUS at 03:12

## 2017-09-23 RX ADMIN — SODIUM CHLORIDE, POTASSIUM CHLORIDE, SODIUM LACTATE AND CALCIUM CHLORIDE: 600; 310; 30; 20 INJECTION, SOLUTION INTRAVENOUS at 00:56

## 2017-09-23 RX ADMIN — Medication 1 TABLET: at 08:20

## 2017-09-23 ASSESSMENT — PAIN SCALES - GENERAL
PAINLEVEL_OUTOF10: 3
PAINLEVEL_OUTOF10: 0
PAINLEVEL_OUTOF10: 3
PAINLEVEL_OUTOF10: 0
PAINLEVEL_OUTOF10: 1
PAINLEVEL_OUTOF10: 0

## 2017-09-23 NOTE — CARE PLAN
Problem: Pain  Goal: Alleviation of Pain or a reduction in pain to the patient's comfort goal  Outcome: PROGRESSING AS EXPECTED  Patient states wants an epidural, went over plan of care of pain management. Pt. States will let RN know when wanting epidural.

## 2017-09-23 NOTE — PROGRESS NOTES
0215 JACKELYN CABALLERO called to attend delivery  0218 viable male  delivered 8/9 apgars 3745g  0223 spontaneous delivery of placenta, intact,   0315 patient states no pain and is comfortable  0435 report given to Frederick MANDEL

## 2017-09-23 NOTE — H&P
History and Physical      Carolina Leong is a 27 y.o. female  at 40w6d who presents for elective IOL for postdates, non-compliant    Subjective:   negative  For CTXS.   negative Feels pain   negative for LOF  negative for vaginal bleeding.   positive for fetal movement    ROS: A comprehensive review of systems was negative.    Past Medical History:   Diagnosis Date   • BV (bacterial vaginosis) 2017     No past surgical history on file.  OB History    Para Term  AB Living   5 3 3   1 3   SAB TAB Ectopic Molar Multiple Live Births   1         3      # Outcome Date GA Lbr Vijay/2nd Weight Sex Delivery Anes PTL Lv   5 Current            4 Term 07/30/15 39w0d  3.175 kg (7 lb) M Vag-Spont EPI N JOHN      Birth Comments: Pt states no complications.    3 Term 14 39w0d  3.175 kg (7 lb) M Vag-Spont None Y JOHN      Birth Comments: Pt states no complications   2 Term 12 39w6d  3.515 kg (7 lb 12 oz) M Vag-Spont EPI N JOHN      Birth Comments: Denies complications.   1 SAB  12w0d             Birth Comments: no D&C needed         Social History     Social History   • Marital status:      Spouse name: N/A   • Number of children: N/A   • Years of education: N/A     Occupational History   • Not on file.     Social History Main Topics   • Smoking status: Never Smoker   • Smokeless tobacco: Never Used   • Alcohol use No      Comment: occ before pregnancy    • Drug use: No   • Sexual activity: Yes     Partners: Male     Birth control/ protection: Condom     Other Topics Concern   • Not on file     Social History Narrative   • No narrative on file     Allergies: Review of patient's allergies indicates no known allergies.    Current Facility-Administered Medications:   •  LR infusion, , Intravenous, Continuous, Katie Cason M.D.  •  fentaNYL (SUBLIMAZE) injection 50 mcg, 50 mcg, Intravenous, Q HOUR PRN, Katie Cason M.D.  •  fentaNYL (SUBLIMAZE) injection 100  mcg, 100 mcg, Intravenous, Q HOUR PRN, Katie Cason M.D.  •  Sod Citrate-Citric Acid (BICITRA) 500-334 MG/5ML solution 30 mL, 30 mL, Oral, Q6HRS PRN, Katie Cason M.D.  •  penicillin G potassium injection 5 Million Units, 5 Million Units, Intravenous, Once **FOLLOWED BY** penicillin G potassium 2.5 Million Units in  mL IVPB, 2.5 Million Units, Intravenous, Q4HRS, Katie Cason M.D.  •  oxytocin (PITOCIN) infusion (for induction), 0.5-20 sabine-units/min, Intravenous, Continuous, Katie Cason M.D.  •  misoprostol (CYTOTEC) tablet 800 mcg, 800 mcg, Rectal, Once PRN, Katie Cason M.D.  •  SODIUM CHLORIDE 0.9 % IV SOLN, , , ,     Prenatal care with [facility] had 4 visits  Patient Active Problem List    Diagnosis Date Noted   • Glucose intolerance (impaired glucose tolerance) 2017     Priority: High     Class: Acute   • Non-compliant patient 2017   • Indication for care in labor or delivery 2017   • Abnormal GTT (glucose tolerance test) 2017   • Abnormal pregnancy US 2017   • Encounter for supervision of other normal pregnancy, third trimester 2017   • BV (bacterial vaginosis) 2017   • History of Gestational diabetes 2015   • Late prenatal care @ 31 wks  2015   • History of  labor with delivery at 35 wks  2015       Objective:      Blood pressure 120/76, pulse 82, temperature 36 °C (96.8 °F), temperature source Temporal, resp. rate 16, height 1.524 m (5'), weight 74.4 kg (164 lb), last menstrual period 12/10/2016, unknown if currently breastfeeding.    General:   no acute distress, alert, cooperative   Skin:   normal   HEENT:  Sclera clear, anicteric   Lungs:   CTA bilateral   Heart:   S1, S2 normal, no murmur, click, rub or gallop, regular rate and rhythm   Abdomen:   gravid, NT   EFW:  140   Pelvis:  adequate with gynecoid pelvis   FHT:  140 BPM   Uterine Size: S=D   Presentations:  Cephalic   Cervix:     Dilation: 3cm    Effacement: 75%    Station:  -2    Consistency: Soft    Position: Middle     Lab Review  Lab:   Blood type: O     Recent Results (from the past 5880 hour(s))   URINALYSIS    Collection Time: 05/13/17  9:37 PM   Result Value Ref Range    Micro Urine Req Microscopic     Color Lt. Yellow     Character Sl Cloudy (A)     Specific Gravity 1.015 <1.035    Ph 7.0 5.0 - 8.0    Glucose Negative Negative mg/dL    Ketones Negative Negative mg/dL    Protein Negative Negative mg/dL    Bilirubin Negative Negative    Nitrite Negative Negative    Leukocyte Esterase Large (A) Negative    Occult Blood Negative Negative   URINE MICROSCOPIC (W/UA)    Collection Time: 05/13/17  9:37 PM   Result Value Ref Range    WBC 2-5 /hpf    RBC 0-2 /hpf    Epithelial Cells Few /hpf    Amorphous Crystal Present /hpf   WET PREP    Collection Time: 05/13/17 10:23 PM   Result Value Ref Range    Significant Indicator NEG     Source GEN     Site VAGINAL     Wet Prep For Parasites       Few clue cells seen.  Few WBC's seen.  No yeast.  No motile Trichomonas seen.     CHLAMYDIA & GC BY PCR    Collection Time: 05/13/17 10:23 PM   Result Value Ref Range    Source Other     C. trachomatis by PCR Negative Negative    N. gonorrhoeae by PCR Negative Negative   POCT Urinalysis    Collection Time: 06/21/17  8:20 AM   Result Value Ref Range    POC Color  Negative    POC Appearance  Negative    POC Leukocyte Esterase large Negative    POC Nitrites negative Negative    POC Urobiligen  Negative (0.2) mg/dL    POC Protein trace Negative mg/dL    POC Urine PH 6.0 5.0 - 8.0    POC Blood negative Negative    POC Specific Gravity 1.020 <1.005 - >1.030    POC Ketones negative Negative mg/dL    POC Biliruben  Negative mg/dL    POC Glucose negative Negative mg/dL   THINPREP PAP,REFLEX HPV ON ASC-US ONLY    Collection Time: 06/21/17  8:52 AM   Result Value Ref Range    Cytology Reg See Path Report    PREG CNTR PRENATAL PN    Collection  Time: 07/03/17  7:21 AM   Result Value Ref Range    WBC 8.3 4.8 - 10.8 K/uL    RBC 4.38 4.20 - 5.40 M/uL    Hemoglobin 13.1 12.0 - 16.0 g/dL    Hematocrit 39.0 37.0 - 47.0 %    MCV 89.0 81.4 - 97.8 fL    MCH 29.9 27.0 - 33.0 pg    MCHC 33.6 33.6 - 35.0 g/dL    RDW 42.7 35.9 - 50.0 fL    Platelet Count 192 164 - 446 K/uL    MPV 11.9 9.0 - 12.9 fL    Neutrophils-Polys 64.80 44.00 - 72.00 %    Lymphocytes 26.20 22.00 - 41.00 %    Monocytes 6.90 0.00 - 13.40 %    Eosinophils 1.10 0.00 - 6.90 %    Basophils 0.40 0.00 - 1.80 %    Immature Granulocytes 0.60 0.00 - 0.90 %    Nucleated RBC 0.00 /100 WBC    Neutrophils (Absolute) 5.38 2.00 - 7.15 K/uL    Lymphs (Absolute) 2.17 1.00 - 4.80 K/uL    Monos (Absolute) 0.57 0.00 - 0.85 K/uL    Eos (Absolute) 0.09 0.00 - 0.51 K/uL    Baso (Absolute) 0.03 0.00 - 0.12 K/uL    Immature Granulocytes (abs) 0.05 0.00 - 0.11 K/uL    NRBC (Absolute) 0.00 K/uL    Color Yellow     Character Cloudy (A)     Specific Gravity 1.024 <1.035    Ph 6.5 5.0 - 8.0    Glucose Negative Negative mg/dL    Ketones Negative Negative mg/dL    Protein Negative Negative mg/dL    Bilirubin Negative Negative    Nitrite Negative Negative    Leukocyte Esterase Moderate (A) Negative    Occult Blood Negative Negative    Micro Urine Req Microscopic     Culture Indicated Yes UA Culture    Rubella IgG Antibody 116.90 IU/mL    Syphilis, Treponemal Qual Non Reactive Non Reactive    Hepatitis B Surface Antigen Negative Negative   HIV ANTIBODIES    Collection Time: 07/03/17  7:21 AM   Result Value Ref Range    HIV Ag/Ab Combo Assay Non Reactive Non Reactive   OP PRENATAL PANEL-BLOOD BANK    Collection Time: 07/03/17  7:21 AM   Result Value Ref Range    ABO Grouping Only O     Rh Grouping Only POS     Antibody Screen Scrn NEG    URINE CULTURE(NEW)    Collection Time: 07/03/17  7:21 AM   Result Value Ref Range    Significant Indicator NEG     Source UR     Urine Culture Mixed skin susana >100,000 cfu/mL    URINE MICROSCOPIC  (W/UA)    Collection Time: 07/03/17  7:21 AM   Result Value Ref Range    WBC 20-50 (A) /hpf    RBC 0-2 /hpf    Bacteria Many (A) None /hpf    Epithelial Cells Many (A) /hpf    Amorphous Crystal Present /hpf    Ca Oxalate Crystal Few /hpf   GLUCOSE 1HR GESTATIONAL    Collection Time: 07/03/17  8:11 AM   Result Value Ref Range    Glucose, Post Dose 142 (H) 70 - 139 mg/dL   GLUCOSE 3 HR GESTATIONAL    Collection Time: 07/19/17  7:10 AM   Result Value Ref Range    Glucose 3 Hour 107 65 - 140 mg/dL    Glucose 2 Hour 123 65 - 155 mg/dL    Baseline Glucose 98 (H) 65 - 95 mg/dL    Glucose 1 Hour 145 65 - 180 mg/dL   CBC WITH DIFFERENTIAL    Collection Time: 09/22/17  4:15 PM   Result Value Ref Range    WBC 7.1 4.8 - 10.8 K/uL    RBC 4.60 4.20 - 5.40 M/uL    Hemoglobin 13.7 12.0 - 16.0 g/dL    Hematocrit 40.2 37.0 - 47.0 %    MCV 87.4 81.4 - 97.8 fL    MCH 29.8 27.0 - 33.0 pg    MCHC 34.1 33.6 - 35.0 g/dL    RDW 41.9 35.9 - 50.0 fL    Platelet Count 128 (L) 164 - 446 K/uL    MPV 13.2 (H) 9.0 - 12.9 fL    Neutrophils-Polys 70.50 44.00 - 72.00 %    Lymphocytes 19.70 (L) 22.00 - 41.00 %    Monocytes 8.70 0.00 - 13.40 %    Eosinophils 0.40 0.00 - 6.90 %    Basophils 0.30 0.00 - 1.80 %    Immature Granulocytes 0.40 0.00 - 0.90 %    Nucleated RBC 0.00 /100 WBC    Neutrophils (Absolute) 5.03 2.00 - 7.15 K/uL    Lymphs (Absolute) 1.41 1.00 - 4.80 K/uL    Monos (Absolute) 0.62 0.00 - 0.85 K/uL    Eos (Absolute) 0.03 0.00 - 0.51 K/uL    Baso (Absolute) 0.02 0.00 - 0.12 K/uL    Immature Granulocytes (abs) 0.03 0.00 - 0.11 K/uL    NRBC (Absolute) 0.00 K/uL   HOLD BLOOD BANK SPECIMEN (NOT TESTED)    Collection Time: 09/22/17  4:15 PM   Result Value Ref Range    Holding Tube - Bb DONE      Assessment:   Carolina Leong at 40w6d  Labor status: Not in labor.  Obstetrical history significant for   Patient Active Problem List    Diagnosis Date Noted   • Glucose intolerance (impaired glucose tolerance) 07/19/2017     Priority: High      Class: Acute   • Non-compliant patient 2017   • Indication for care in labor or delivery 2017   • Abnormal GTT (glucose tolerance test) 2017   • Abnormal pregnancy US 2017   • Encounter for supervision of other normal pregnancy, third trimester 2017   • BV (bacterial vaginosis) 2017   • History of Gestational diabetes 2015   • Late prenatal care @ 31 wks  2015   • History of  labor with delivery at 35 wks  2015   .      Plan:     Admit to L&D  GBS unknown  PCN for GBS  Pitocin for IOL  Anticipate

## 2017-09-23 NOTE — PROGRESS NOTES
[facility] AND DELIVERY PROGRESS NOTE    PATIENT ID:  NAME:  Carolina Leong  MRN:               [id number]  YOB: 1990     27 y.o. female  at 41w0d.    Subjective: feeling pressure    Objective:    Vitals:    17 2328 17 2347 17 0006 17 0026   BP: 100/58 109/67 107/66 104/63   Pulse: 68 62 61 63   Resp:       Temp:    36.3 °C (97.4 °F)   TempSrc:       SpO2:       Weight:       Height:           Cervix:  8cm/100%/0  And is not complete at 0213   [place]: Uterine Contractions Q2 minutes. RT 0-10 mm Hg  Ctx peeks now at 40-60 mmHg   FHRM: Baseline 135 , Avg variability, Accels +,  Variables with contractions   Pitocin: 3 milliunites  Pain control:  epidural    Assessment: 27 y.o. female    at 41w0d.    Plan:   1. Labor:  transition  2. IUP:  EFW  gms , Category  2 tracing,  vtx  presentation.  GBS unkn  3. Anticipate

## 2017-09-23 NOTE — CARE PLAN
Problem: Risk for Infection, Impaired Wound Healing  Goal: Remain free from signs and symptoms of infection  Outcome: PROGRESSING AS EXPECTED  Discussed with patient about hand washing. Pt aware to wash hands after each bathroom use and to have family and friends use hand gel when visiting. Pt. Verbalized understanding.

## 2017-09-23 NOTE — PROGRESS NOTES
Received report from DIONISIO Traore RN to take over care for patient. Pt  40.6 weeks   patient states when in pain will want epidural.   Dr. Daria Wilson notified of patients history of GDM, FS completed 70. Gave patient apple juice for blood sugar. Dr. Daria Wilson notified. No orders received.   JACKELYN Adrian at bedside to assess patient. SVE /-2   Dr. Leon in room, talked to patient about epidural   Test dose given, tolerated well   patient comfortable, states no pain

## 2017-09-23 NOTE — CARE PLAN
Problem: Potential anxiety related to difficulty adapting to parental role  Goal: Patient will verbalize and demonstrate effective bonding and parenting behavior  Outcome: PROGRESSING AS EXPECTED  Use of  services when needed to relay pertinent information to patient and her to staff

## 2017-09-23 NOTE — CARE PLAN
Problem: Infection  Goal: Will remain free from infection    Intervention: Implement standard precautions and perform hand washing before and after patient contact  Hands washed appropriately      Problem: Pain  Goal: Alleviation of Pain or a reduction in pain to the patient's comfort goal    Intervention: Pain Management - Epidural/Spinal  Patient would like an epidural

## 2017-09-23 NOTE — PROGRESS NOTES
Patient presents to unit from L&D via wheelchair with infant in arms.  Infant to bassinet and patient transfers to bed with standby assist.  Bedside report received from off going RN.  Plan of care discussed, questions answered and understanding verbalized.  Oriented to room, call light, care board and emergency light with help of  services.  IV fluids to pump per orders.  Encouraged to call for assistance or with questions, comments or concerns.  REquesting influenza vaccination

## 2017-09-23 NOTE — PROGRESS NOTES
[facility] DELIV[facility] PROGRESS NOTE    PATIENT ID:  NAME:  Carolina Leong  MRN:               [id number]  YOB: 1990     27 y.o. female  at 40w6d.    Subjective: no c/o resting well with epidural     Objective:    Vitals:    17 2156 17 2203 17 2208 17 2247   BP: (!) 88/56 118/74 113/68 104/72   Pulse: 73 60 61 65   Resp:       Temp:       TempSrc:       SpO2:       Weight:       Height:           Cervix:  5cm/80%/-2  [place]: Uterine Contractions Q 1-2 minutes. 30-50 sec palp mod  FHRM: Baseline 135-140, Avg variability, Accels + , no decels  Pitocin:  At 5 but decreased to 2 milliunits as bloody show and clots are still coming out 30 40 cc more    Pain control: Epidural   AROM 2307 clear fluid IUPC placed  There is no blood in the amniotic fluid  noted    Assessment: 27 y.o. female    at 40w6d.    Plan:   1. Labor: active   2. IUP: , Category 1 tracing, vertex presentation.  GBS unknown  3. Anticipate   4. 4. watch bleeding, less than before will see if slowing the contractions helps

## 2017-09-23 NOTE — PROGRESS NOTES
[facility] DELIV[facility] PROGRESS NOTE    PATIENT ID:  NAME:  Carolina Leong  MRN:               [id number]  YOB: 1990     27 y.o. female  at 40w6d.    Subjective: Pt is resting comfortable with epidural    Objective:    Vitals:    17 2128 17 2148 17 2156 17 2203   BP: 103/68 (!) 89/54 (!) 88/56 118/74   Pulse: 68 66 73 60   Resp:       Temp:       TempSrc:       SpO2:       Weight:       Height:           Cervix:  4cm/70%/-2 per RN Rosalinda the cervix is very soft  [place]: Uterine Contractions Q2-3 minutes.   FHRM: Baseline 135,  Avg variability, Accels  +,  No decels  Pitocin: at 5 miliunits  Pain control: epidural   Pt is unchanged cervix contractions palp mild to moderate  Large clot with some bleeding noted on pad approx 100 cc noted. Will watch bleeding and contraction pattern    Will recheck at 2300 and see if any change, will see if we can ROM at that time     Assessment: 27 y.o. female    at 40w6d.    Plan:   1. Labor: active  2. IUP:  EFW  gm, Category 1 tracing, vertex presentation.  GBS unknown getting #2 dose of Pen G now  3. Anticipate

## 2017-09-23 NOTE — PROGRESS NOTES
2200 JACKELYN Adrian CNM at bedside, bloody show showed to CNM, 100ml of blood with large clot  2205 SVE 4/70/-2

## 2017-09-23 NOTE — PROGRESS NOTES
0700 - Bedside report received from MINISTERIO Mack. Patient care assumed.  0820 - Pt assessment complete, wnl. Fundus firm with minimal discharge. Pt ambulating to bathroom and voiding without difficulty. Patient denies any dizziness or lightheadedness at this time. Patient denies any calf pain or tenderness at this time. Reviewed use of emergency light. Plan of care discussed with patient for the day. Pain medication plan discussed with patient; patient requesting not to be woken up if pain medications are available; patient states she will call if PRN pain medication is wanted. All questions/concerns addressed at this time. Encouraged to call with needs, will monitor

## 2017-09-23 NOTE — PROGRESS NOTES
[facility] DELIVERY PROGRESS NOTE    PATIENT ID:  NAME:  Carolina Leong  MRN:               [id number]  YOB: 1990     27 y.o. female  at 40w6d.    Subjective: Pt is here for IOL 41 1/7 week IUP IRIS 17 at 40 6/7 weeks   Pt had very limited and late care at TP      Objective:    Vitals:    17 1616 17 1922   BP: 120/76 118/79   Pulse: 82 66   Resp: 16    Temp: 36 °C (96.8 °F)    TempSrc: Temporal    Weight: 74.4 kg (164 lb)    Height: 1.524 m (5')        Cervix:  3cm/70%/-3 by RN on day shift   [place]: Uterine Contractions Q4 minutes. 30-40 sec long palp mild   FHRM: Baseline 145-150, Avg variability, Accels + ,  No decels  Pitocin:  4 miliunits  Pain control:  None needed per patient at this time but requests epidural when the pain increases  Lungs CTA bilat  Heart: RRR s1s2 with out murmur  No edema DTR's 2+   FS BS was 70 at 1910     Assessment: 27 y.o. female    at 40w6d.  Glucose intolerance   Plan:   1. Labor: early IOL for post dates and limited care,   2. IUP:  EFW  9561-5756 gms, Category  1 tracing, vertex presentation.  GBS unknown   3. Anticipate   4. 4. Epidural for pain control is needed

## 2017-09-23 NOTE — CARE PLAN
Problem: Altered physiologic condition related to immediate post-delivery state and potential for bleeding/hemorrhage  Goal: Patient physiologically stable as evidenced by normal lochia, palpable uterine involution and vital signs within normal limits  Outcome: PROGRESSING AS EXPECTED  Assess lochia and fundus every 4 hours x12 followed by every shift and as indicated.  Educate on when to contact physician

## 2017-09-23 NOTE — PROGRESS NOTES
0100 Patient remains comfortable with epidural, JACKELYN CABALLERO at bedside SVE 6/100/-1, no bleeding at this time.

## 2017-09-23 NOTE — DELIVERY NOTE
Delivery Note    PATIENT ID:  NAME:  Carolina Leong  MRN:               [id number]  YOB: 1990    Labor Course  Pt was admitted for Induction of labor due to limited Prenatal care and 40 6/7 weeks.  Pt had uneventful labor process some variables with complete dilation noted only Cat 1 through most of labor and recent Cat 2 changes. Pt's PN course was complicated by limited care and Glucose Intolerance with 1 high value on 3 hour gtt.  Pt was complete at 0215.    On 2017  at 02:18, this 27 y.o.,  41w0d now   , GBS unknown ( pt did receive 2 doses Penicillin G IVPB)  female delivered via OA/ under Epidural anesthesia a viable male infant weighing 8 lbs and 4 oz (3745gm) and 20 inches long with APGAR scores of 8 and 9 at one and five minutes.   Baby to maternal abdomen.  Spontaneous cry at 15 seconds.  Mouth and nares bulb suctioned by MINISTERIO Mccarthy. Delayed cord clamping occurred with cord doubly clamped by myself and cut by me after pulsations had stopped.  Pitocin infusing in IVF.  Spontaneous delivery of Carmen placenta grossly intact @ 02:23.  CVx3. FF and bleeding small.  Upon vaginal exam, there was left periurethral abrasion only not bleeding and no need for repair. Pt and infant are stable and bonding.  Lap count: none.  Estimated blood loss: 300 cc .      Rachel Vogel, DONIM, APRN  Dr. Cuellar, attending physician

## 2017-09-23 NOTE — PROGRESS NOTES
2305 T Kaylah APN at bedside, SVE 5/80/-2  2307 IUPC placed, 30-40mL bloody show out, AROM clear, will continue to watch bleeding

## 2017-09-24 VITALS
DIASTOLIC BLOOD PRESSURE: 76 MMHG | HEIGHT: 60 IN | RESPIRATION RATE: 16 BRPM | HEART RATE: 85 BPM | OXYGEN SATURATION: 99 % | SYSTOLIC BLOOD PRESSURE: 106 MMHG | WEIGHT: 164 LBS | TEMPERATURE: 97.6 F | BODY MASS INDEX: 32.2 KG/M2

## 2017-09-24 PROCEDURE — A9270 NON-COVERED ITEM OR SERVICE: HCPCS | Performed by: OBSTETRICS & GYNECOLOGY

## 2017-09-24 PROCEDURE — 700102 HCHG RX REV CODE 250 W/ 637 OVERRIDE(OP): Performed by: OBSTETRICS & GYNECOLOGY

## 2017-09-24 RX ORDER — IBUPROFEN 600 MG/1
600 TABLET ORAL EVERY 6 HOURS PRN
Qty: 30 TAB | Refills: 0 | Status: SHIPPED | OUTPATIENT
Start: 2017-09-24 | End: 2019-12-02

## 2017-09-24 RX ADMIN — Medication 1 TABLET: at 07:35

## 2017-09-24 ASSESSMENT — LIFESTYLE VARIABLES: EVER_SMOKED: NEVER

## 2017-09-24 ASSESSMENT — PATIENT HEALTH QUESTIONNAIRE - PHQ9
1. LITTLE INTEREST OR PLEASURE IN DOING THINGS: NOT AT ALL
SUM OF ALL RESPONSES TO PHQ QUESTIONS 1-9: 0
2. FEELING DOWN, DEPRESSED, IRRITABLE, OR HOPELESS: NOT AT ALL
SUM OF ALL RESPONSES TO PHQ9 QUESTIONS 1 AND 2: 0

## 2017-09-24 ASSESSMENT — PAIN SCALES - GENERAL
PAINLEVEL_OUTOF10: 0
PAINLEVEL_OUTOF10: 0

## 2017-09-24 NOTE — DISCHARGE INSTRUCTIONS
POSTPARTUM DISCHARGE INSTRUCTIONS FOR MOM    YOB: 1990   Age: 27 y.o.               Admit Date: 2017     Discharge Date: 2017  Attending Doctor:  Katie Lind*                  Allergies:  Review of patient's allergies indicates no known allergies.    Discharged to home by car. Discharged via wheelchair, hospital escort: Yes.  Special equipment needed: Not Applicable  Belongings with: Personal  Be sure to schedule a follow-up appointment with your primary care doctor or any specialists as instructed.     Discharge Plan:   Diet Plan: Discussed  Activity Level: Discussed  Confirmed Follow up Appointment: Patient to Call and Schedule Appointment  Confirmed Symptoms Management: Discussed  Medication Reconciliation Updated: Yes  Influenza Vaccine Indication: Not indicated: Previously immunized this influenza season and > 8 years of age  Influenza Vaccine Given - only chart on this line when given: Influenza Vaccine Given (See MAR)    REASONS TO CALL YOUR OBSTETRICIAN:  1.   Persistent fever or shaking chills (Temperature higher than 100.4)  2.   Heavy bleeding (soaking more than 1 pad per hour); Passing clots  3.   Foul odor from vagina  4.   Mastitis (Breast infection; breast pain, chills, fever, redness)  5.   Urinary pain, burning or frequency  6.   Episiotomy infection  7.   Abdominal incision infection  8.   Severe depression longer than 24 hours    HAND WASHING  · Prior to handling the baby.  · Before breastfeeding or bottle feeding baby.  · After using the bathroom or changing the baby's diaper.    WOUND CARE  Ask your physician for additional care instructions.  In general:    ·  Incision:      · Keep clean and dry.    · Do NOT lift anything heavier than your baby for up to 6 weeks.    · There should not be any opening or pus.      VAGINAL CARE  · Nothing inside vagina for 6 weeks: no sexual intercourse, tampons or douching.  · Bleeding may continue for 2-4 weeks.  Amount  "may vary.    · Call your physician for heavy bleeding which means soaking more than 1 pad per hour    BIRTH CONTROL  · It is possible to become pregnant at any time after delivery and while breastfeeding.  · Plan to discuss a method of birth control with your physician at your follow up visit. visit.    DIET AND ELIMINATION  · Eating more fiber (bran cereal, fruits, and vegetables) and drinking plenty of fluids will help to avoid constipation.  · Urinary frequency after childbirth is normal.    POSTPARTUM BLUES  During the first few days after birth, you may experience a sense of the \"blues\" which may include impatience, irritability or even crying.  These feeling come and go quickly.  However, as many as 1 in 10 women experience emotional symptoms known as postpartum depression.    Postpartum depression:  May start as early as the second or third day after delivery or take several weeks or months to develop.  Symptoms of \"blues\" are present, but are more intense:  Crying spells; loss of appetite; feelings of hopelessness or loss of control; fear of touching the baby; over concern or no concern at all about the baby; little or no concern about your own appearance/caring for yourself; and/or inability to sleep or excessive sleeping.  Contact your physician if you are experiencing any of these symptoms.    Crisis Hotline:  · [place] Crisis Hotline:  6-505-JICQHOX  Or 1-416.606.9571  · [place] Crisis Hotline:  1-386.399.3531  Or 795-723-7121    PREVENTING SHAKEN BABY:  If you are angry or stressed, PUT THE BABY IN THE CRIB, step away, take some deep breaths, and wait until you are calm to care for the baby.  DO NOT SHAKE THE BABY.  You are not alone, call a supporter for help.    · Crisis Call Center 24/7 crisis line 325-428-4096 or 1-369.147.9286  · You can also text them, text \"ANSWER\" to 263610    QUIT SMOKING/TOBACCO USE:  I understand the use of any tobacco products increases my chance of suffering from future " heart disease and could cause other illnesses which may shorten my life. Quitting the use of tobacco products is the single most important thing I can do to improve my health. For further information on smoking / tobacco cessation call a Toll Free Quit Line at 1-631.281.8811 (*National [employer] [place]) or 1-394.263.9873 (American Lung Association) or you can access the web based program at www.lungusa.org.    · [place] Tobacco Users Help Line:  (287) 159-2433       Toll Free: 1-355.197.7425  · Quit Tobacco Program [facility] (833)082-3811    DEPRESSION / SUICIDE RISK:  As you are discharged from this [facility], it is important to learn how to keep safe from harming yourself.    Recognize the warning signs:  · Abrupt changes in personality, positive or negative- including increase in energy   · Giving away possessions  · Change in eating patterns- significant weight changes-  positive or negative  · Change in sleeping patterns- unable to sleep or sleeping all the time   · Unwillingness or inability to communicate  · Depression  · Unusual sadness, discouragement and loneliness  · Talk of wanting to die  · Neglect of personal appearance   · Rebelliousness- reckless behavior  · Withdrawal from people/activities they love  · Confusion- inability to concentrate     If you or a loved one observes any of these behaviors or has concerns about self-harm, here's what you can do:  · Talk about it- your feelings and reasons for harming yourself  · Remove any means that you might use to hurt yourself (examples: pills, rope, extension cords, firearm)  · Get professional help from the community (Mental Health, Substance Abuse, psychological counseling)  · Do not be alone:Call your Safe Contact- someone whom you trust who will be there for you.  · Call your local CRISIS HOTLINE 165-9460 or 603-414-9413  · Call your local Children's Mobile Crisis Response Team [place] (538) 169-3341 or  www.[place].EQO  · Call the toll free National Suicide Prevention Hotlines   · National Suicide Prevention Lifeline 122-505-ZVAQ (1583)  · National Hope Line Network 800-SUICIDE (093-5590)    DISCHARGE SURVEY:  Thank you for choosing [facility].  We hope we provided you with very good care.  You may be receiving a survey in the mail.  Please fill it out.  Your opinion is valuable to us.    ADDITIONAL EDUCATION GIVEN TO PATIENT:  Pelvic rest for 6 weeks      My signature on this form indicates that:  1.  I have reviewed and understand the above information  2.  My questions regarding this information have been answered to my satisfaction.  3.  I have formulated a plan with my discharge nurse to obtain my prescribed medication for home.

## 2017-09-24 NOTE — PROGRESS NOTES
services used #[id number]. Discharge instructions reviewed and signed by MOB. All prescriptions reviewed with MOB. Sleep sack given to MOB. Instructed MOB to call when ready to place infant in car seat; this RN will remove cord clamp, security transponder and will check and verify car seat. MOB verbalized understanding.

## 2017-09-24 NOTE — DISCHARGE SUMMARY
Discharge Summary:      Carolina Leong      Admit Date:   2017  Discharge Date:  2017     Admitting diagnosis:  Pregnancy  Indication for care in labor or delivery  Non-compliant patient  Discharge Diagnosis: Status post vaginal, spontaneous.  Pregnancy Complications: gestational diabetes, noncomplaince  Tubal Ligation:  no        History:  Past Medical History:   Diagnosis Date   • BV (bacterial vaginosis) 2017     OB History    Para Term  AB Living   5 3 3   1 3   SAB TAB Ectopic Molar Multiple Live Births   1         3      # Outcome Date GA Lbr Vijay/2nd Weight Sex Delivery Anes PTL Lv   5 Current            4 Term 07/30/15 39w0d  3.175 kg (7 lb) M Vag-Spont EPI N OJHN      Birth Comments: Pt states no complications.    3 Term 14 39w0d  3.175 kg (7 lb) M Vag-Spont None Y JOHN      Birth Comments: Pt states no complications   2 Term 12 39w6d  3.515 kg (7 lb 12 oz) M Vag-Spont EPI N JOHN      Birth Comments: Denies complications.   1 SAB  12w0d             Birth Comments: no D&C needed            Review of patient's allergies indicates no known allergies.  Patient Active Problem List    Diagnosis Date Noted   • Glucose intolerance (impaired glucose tolerance) 2017     Priority: High     Class: Acute   • Non-compliant patient 2017   • Indication for care in labor or delivery 2017   • Abnormal GTT (glucose tolerance test) 2017   • Abnormal pregnancy US 2017   • Encounter for supervision of other normal pregnancy, third trimester 2017   • BV (bacterial vaginosis) 2017   • History of Gestational diabetes 2015   • Late prenatal care @ 31 wks  2015   • History of  labor with delivery at 35 wks  2015        Hospital Course:   27 y.o. , now para 4, was admitted with the above mentioned diagnosis, underwent Active Labor, vaginal, spontaneous. Patient postpartum course was unremarkable, with progressive  advancement in diet , ambulation and toleration of oral analgesia. Patient without complaints today and desires discharge.      Vitals:    09/23/17 0400 09/23/17 0820 09/23/17 1200 09/23/17 2000   BP: 108/73 104/75 115/80 106/78   Pulse: 85 89 85 77   Resp: 17 18 18 16   Temp: 36.8 °C (98.3 °F) 36.7 °C (98 °F) 36.5 °C (97.7 °F) 36.8 °C (98.3 °F)   TempSrc:       SpO2: 97% 95%  97%   Weight:       Height:           Current Facility-Administered Medications   Medication Dose   • oxytocin (PITOCIN) infusion (for postpartum)   mL/hr   • ibuprofen (MOTRIN) tablet 600 mg  600 mg   • oxycodone-acetaminophen (PERCOCET) 5-325 MG per tablet 1 Tab  1 Tab   • oxycodone-acetaminophen (PERCOCET) 5-325 MG per tablet 2 Tab  2 Tab   • LR infusion     • PRN oxytocin (PITOCIN) (20 Units/1000 mL) PRN for excessive uterine bleeding - See Admin Instr  125-999 mL/hr   • docusate sodium (COLACE) capsule 100 mg  100 mg   • prenatal plus vitamin (STUARTNATAL 1+1) 27-1 MG tablet 1 Tab  1 Tab   • misoprostol (CYTOTEC) tablet 600 mcg  600 mcg   • carboPROST (HEMABATE) injection 250 mcg  250 mcg   • methylergonovine (METHERGINE) injection 0.2 mg  0.2 mg   • magnesium hydroxide (MILK OF MAGNESIA) suspension 30 mL  30 mL       Exam:  Breast Exam: negative  Abdomen: Abdomen soft, non-tender. BS normal. No masses,  No organomegaly  Fundus Non Tender: yes  Incision: none  Perineum: perineum intact  Extremity: extremities, peripheral pulses and reflexes normal     Labs:  Recent Labs      09/22/17   1615  09/23/17   1150   WBC  7.1  10.2   RBC  4.60  4.33   HEMOGLOBIN  13.7  13.0   HEMATOCRIT  40.2  38.1   MCV  87.4  88.0   MCH  29.8  30.0   MCHC  34.1  34.1   RDW  41.9  41.5   PLATELETCT  128*  116*   MPV  13.2*  12.8        Activity:   Discharge to home  Pelvic Rest x 6 weeks    Assessment:  normal postpartum course  Discharge Assessment: No areas of skin breakdown/redness; surgical incision intact/healing     Follow up: [facility] or [facility] in 5 weeks for vaginal      Discharge Meds:   Current Outpatient Prescriptions   Medication Sig Dispense Refill   • ibuprofen (MOTRIN) 600 MG Tab Take 1 Tab by mouth every 6 hours as needed (For cramping after delivery; do not give if patient is receiving ketorolac (Toradol)). 30 Tab 0       Fela Coleman, P.A.

## 2017-09-24 NOTE — PROGRESS NOTES
0700 - Bedside report received from MINISTERIO Gary. Patient care assumed.  0745 - Pt assessment complete, wnl. Fundus firm with minimal discharge. Pt ambulating to bathroom and voiding without difficulty. Patient denies any dizziness or lightheadedness at this time. Patient denies any calf pain or tenderness at this time. Reviewed use of emergency light. Plan of care discussed with patient for the day. Pain medication plan discussed with patient; patient requesting not to be woken up if pain medications are available; patient states she will call if PRN pain medication is wanted. All questions/concerns addressed at this time. Encouraged to call with needs, will monitor

## 2017-09-24 NOTE — CARE PLAN
Problem: Alteration in comfort related to episiotomy, vaginal repair and/or after birth pains  Goal: Patient verbalizes acceptable pain level  Outcome: PROGRESSING AS EXPECTED  Patient has verbalized acceptable pain level 1-310. Pain currently being managed with PRN medication orders. Will continue to monitor with Q6 hour checks and Q2 hour rounding.     Problem: Potential knowledge deficit related to lack of understanding of self and  care  Goal: Patient will demonstrate ability to care for self and infant  Outcome: PROGRESSING AS EXPECTED  Patient has demonstrated that she is able to ambulate and care for self and infant independently. Pain relief is met with PRN medication orders. Will continue to monitor with Q6 hour checks and Q2 hour checks

## 2017-09-24 NOTE — DISCHARGE PLANNING
Medical Social Work    Communicated with pt via . Pt reports that she has a car seat and it will be delivered prior to d/c today. Pt states that she is prepared for baby and is not in need of any supplies or resources. Pt has other children at home and has support from family. RN notified SW that pt might be leaving by taxi. She will page/call SW if there are any issues or concerns with car seat.

## 2017-09-24 NOTE — PROGRESS NOTES
services used #[id number]. Explained to MOB and family member that the car seat they have to use for infant is too old; car seats have a 5 year expiration date on them and this car seat is 12 years old. MOB then stated that she does not have any money to go and buy a new car seat. Contacted on-call  and she stated that there are no car seats in the hospital; that a family member needs to go buy a new car seat. Explained this information to MOB; LEYLA's brother stated he will go out and buy a new car seat. Will await arrival of new car seat prior to discharge.

## 2017-09-24 NOTE — CARE PLAN
Problem: Altered physiologic condition related to immediate post-delivery state and potential for bleeding/hemorrhage  Goal: Patient physiologically stable as evidenced by normal lochia, palpable uterine involution and vital signs within normal limits  Outcome: PROGRESSING AS EXPECTED  Assessment WNL. VSS.     Problem: Alteration in comfort related to episiotomy, vaginal repair and/or after birth pains  Goal: Patient is able to ambulate, care for self and infant  Outcome: PROGRESSING AS EXPECTED  Pt cites adequate relief of pain with pain medication provided. Pt will call if she needs pain medication. Pt verbalizes understanding.

## 2017-09-24 NOTE — DISCHARGE PLANNING
Medical Social Work    SW spoke with RN. Pt doing well and no concerns. Pt's spouse will be bringing car seat today, so pt can discharge with baby. SW will follow up with pt regarding any further needs prior to d/c.

## 2017-09-24 NOTE — PROGRESS NOTES
Family member back with new car seat. Cord clamp and security transponder removed. Unit sleep sack removed.  services used #[id number]. Instructed MOB to dress infant, strap infant in car seat and press call light when infant is strapped in. MOB then explained that she does not have any clothes here at the hospital for infant, nor any blankets and that they were at home. This RN provided MOB with unit receiving blankets and infant shirt to take infant home in. Family member placed infant in car seat and said they were ready to go. Explained to MOB and family member that the car seat straps need to be moved down (too high) and chest straps need to be tightened (too loose). Instructed them to read the car seat manual regarding how to fix the straps and guidelines for how tight straps need to be. Car seat straps were fixed by MOB/family member and infant was placed and secured in car seat. Car seat checked and verified by this RN. MOB, infant, and family member escorted off unit and to car.

## 2017-10-26 ENCOUNTER — HOSPITAL ENCOUNTER (EMERGENCY)
Facility: MEDICAL CENTER | Age: 27
End: 2017-10-27
Attending: EMERGENCY MEDICINE
Payer: MEDICAID

## 2017-10-26 DIAGNOSIS — R31.9 URINARY TRACT INFECTION WITH HEMATURIA, SITE UNSPECIFIED: ICD-10-CM

## 2017-10-26 DIAGNOSIS — T78.40XA ALLERGIC REACTION, INITIAL ENCOUNTER: ICD-10-CM

## 2017-10-26 DIAGNOSIS — N39.0 URINARY TRACT INFECTION WITH HEMATURIA, SITE UNSPECIFIED: ICD-10-CM

## 2017-10-26 LAB
APPEARANCE UR: CLEAR
BACTERIA #/AREA URNS HPF: NEGATIVE /HPF
BILIRUB UR QL STRIP.AUTO: NEGATIVE
COLOR UR: YELLOW
CULTURE IF INDICATED INDCX: YES UA CULTURE
EPI CELLS #/AREA URNS HPF: ABNORMAL /HPF
GLUCOSE UR STRIP.AUTO-MCNC: NEGATIVE MG/DL
HYALINE CASTS #/AREA URNS LPF: ABNORMAL /LPF
KETONES UR STRIP.AUTO-MCNC: NEGATIVE MG/DL
LEUKOCYTE ESTERASE UR QL STRIP.AUTO: ABNORMAL
MICRO URNS: ABNORMAL
NITRITE UR QL STRIP.AUTO: NEGATIVE
PH UR STRIP.AUTO: 5.5 [PH]
PROT UR QL STRIP: NEGATIVE MG/DL
RBC # URNS HPF: ABNORMAL /HPF
RBC UR QL AUTO: ABNORMAL
SP GR UR STRIP.AUTO: 1.02
UROBILINOGEN UR STRIP.AUTO-MCNC: 1 MG/DL
WBC #/AREA URNS HPF: ABNORMAL /HPF

## 2017-10-26 PROCEDURE — 99284 EMERGENCY DEPT VISIT MOD MDM: CPT

## 2017-10-26 PROCEDURE — 87086 URINE CULTURE/COLONY COUNT: CPT

## 2017-10-26 PROCEDURE — 81001 URINALYSIS AUTO W/SCOPE: CPT

## 2017-10-26 RX ORDER — PREDNISONE 20 MG/1
60 TABLET ORAL DAILY
Qty: 15 TAB | Refills: 0 | Status: SHIPPED | OUTPATIENT
Start: 2017-10-26 | End: 2017-10-31

## 2017-10-26 RX ORDER — CEFDINIR 300 MG/1
300 CAPSULE ORAL 2 TIMES DAILY
Qty: 10 CAP | Refills: 0 | Status: SHIPPED | OUTPATIENT
Start: 2017-10-26 | End: 2017-10-31

## 2017-10-26 RX ORDER — CEFDINIR 300 MG/1
300 CAPSULE ORAL ONCE
Status: COMPLETED | OUTPATIENT
Start: 2017-10-27 | End: 2017-10-27

## 2017-10-27 VITALS
OXYGEN SATURATION: 97 % | HEART RATE: 87 BPM | BODY MASS INDEX: 27.3 KG/M2 | RESPIRATION RATE: 16 BRPM | TEMPERATURE: 98.3 F | WEIGHT: 154.1 LBS | SYSTOLIC BLOOD PRESSURE: 111 MMHG | HEIGHT: 63 IN | DIASTOLIC BLOOD PRESSURE: 82 MMHG

## 2017-10-27 PROCEDURE — A9270 NON-COVERED ITEM OR SERVICE: HCPCS | Performed by: EMERGENCY MEDICINE

## 2017-10-27 PROCEDURE — 700102 HCHG RX REV CODE 250 W/ 637 OVERRIDE(OP): Performed by: EMERGENCY MEDICINE

## 2017-10-27 RX ADMIN — CEFDINIR 300 MG: 300 CAPSULE ORAL at 00:02

## 2017-10-27 NOTE — ED NOTES
"Triage notes    Pt c/o of rash all over body for last month also blood in urine x 1 month but did just have a baby 1 month ago.     .  Chief Complaint   Patient presents with   • Rash     x 3 weeks all over body denies pain but does itch    • Blood in Urine     x 1 month since having baby      ./82   Pulse 91   Temp 36.8 °C (98.3 °F) (Temporal)   Resp 16   Ht 1.6 m (5' 3\")   Wt 69.9 kg (154 lb 1.6 oz)   LMP 12/10/2016   SpO2 98%   BMI 27.30 kg/m²     "

## 2017-10-27 NOTE — DISCHARGE INSTRUCTIONS
Alergias  (Allergies)  Neela alergia es neela reacción anormal del sistema de defensa del cuerpo (sistema inmunitario) ante neela sustancia. Las alergias pueden aparecer a cualquier edad.  ¿CUÁLES SON LAS CAUSAS DE LAS ALERGIAS?  La reacción alérgica se produce cuando el sistema inmunitario, por equivocación, reacciona ante neela sustancia normalmente inocua, llamada alérgeno, chio si fuera perjudicial. El sistema inmunitario libera anticuerpos para combatir la sustancia. Con el tiempo, los anticuerpos liberan neela sustancia química llamada histamina en el torrente sanguíneo. La liberación de histamina tiene chio fin proteger al cuerpo de la infección, chace también causa molestias.  Cualquiera de estas acciones puede desencadenar neela reacción alérgica:  · [place] un alérgeno.  · Inhalar un alérgeno.  · Tocar un alérgeno.  ¿CUÁLES SON LAS CLASES DE ALERGIAS?  Hay muchas clases de alergias. Entre las más frecuentes, se incluyen las siguientes:  · Alergias estacionales. Por lo general, esta clase de alergia se produce por sustancias que solo están presentes terell determinadas estaciones, por ejemplo, el moho y el polen.  · Alergias a los alimentos.  · Alergias a los medicamentos.  · Alergias a los insectos.  · Alergias a la caspa de los animales.  ¿CUÁLES SON LOS SÍNTOMAS DE LAS ALERGIAS?  Entre los posibles síntomas de la alergia, se incluyen los siguientes:  · Hinchazón de los labios, la yancy, la lengua, la boca o la garganta.  · Estornudos, tos o sibilancias.  · Congestión nasal.  · Hormigueo en la boca.  · Erupción cutánea.  · Picazón.  · Zonas de piel hinchadas, tolliver y que producen picazón (ronchas).  · Lagrimeo.  · Vómitos.  · Diarrea.  · Mareos.  · Sensación de desvanecimiento.  · Desmayos.  · Problemas para respirar o tragar.  · Opresión en el pecho.  · Latidos cardíacos rápidos.  ¿CÓMO SE DIAGNOSTICAN LAS ALERGIAS?  Las alergias se diagnostican en función de los antecedentes médicos y familiares, y mediante kaylee o  más de estos elementos:  · Pruebas cutáneas.  · Análisis de danuta.  · Un registro de alimentos. Un registro de alimentos incluye todos los alimentos y las bebidas que usted consume en un día, y todos los síntomas que experimenta.  · Los resultados de neela dieta de eliminación. Neela dieta de eliminación implica eliminar alimentos de la dieta y luego incorporarlos nuevamente, kaylee a la vez, para averiguar si hay kaylee en particular que le cause neela reacción alérgica.  ¿CÓMO SE TRATAN LAS ALERGIAS?  No hay neela kerri para las alergias, chace las reacciones alérgicas pueden tratarse con medicamentos. Generalmente, las reacciones graves deben tratarse en un hospital.  ¿CÓMO PUEDEN PREVENIRSE LAS REACCIONES?  La mejor manera de prevenir neela reacción alérgica es evitar la sustancia que le causa alergia. Las vacunas y los medicamentos para la alergia también pueden ayudar a prevenir las reacciones en algunos casos. Las personas con reacciones alérgicas graves pueden prevenir neela reacción potencialmente mortal llamada anafilaxis con la administración inmediata de un medicamento después de la exposición al alérgeno.     Esta información no tiene chio fin reemplazar el consejo del médico. Asegúrese de hacerle al médico cualquier pregunta que tenga.     Document Released: 12/18/2006 Document Revised: 01/08/2016  Elsevier Interactive Patient Education ©2016 [employer] Inc.  Infección urinaria   (Urinary Tract Infection)   La infección urinaria puede ocurrir en cualquier lugar del tracto urinario. El tracto urinario es un sistema de drenaje del cuerpo por el que se eliminan los desechos y el exceso de agua. El tracto urinario está formado por dos riñones, dos uréteres, la vejiga y la uretra. Los riñones son órganos que tienen forma de frijol. Cada riñón tiene aproximadamente el tamaño del puño. Están situados debajo de las costillas, kaylee a cada lado de la columna vertebral  CAUSAS   La causa de la infección son los microbios, que son  organismos microscópicos, que incluyen hongos, virus, y bacterias. Estos organismos son tan pequeños que sólo pueden verse a través del microscopio. Las bacterias son los microorganismos que más comúnmente causan infecciones urinarias.   SÍNTOMAS   Los síntomas pueden variar según la edad y el sexo del paciente y por la ubicación de la infección. Los síntomas en las mujeres jóvenes incluyen la necesidad frecuente e intensa de orinar y neela sensación dolorosa de ardor en la vejiga o en la uretra terell la micción. Las mujeres y los hombres mayores podrán sentir cansancio, temblores y debilidad y sentir ailyn musculares y dolor abdominal. Si tiene fiebre, puede significar que la infección está en los riñones. Otros síntomas son dolor en la espalda o en los lados debajo de las costillas, náuseas y vómitos.   DIAGNÓSTICO   Para diagnosticar neela infección urinaria, el médico le preguntará acerca de magnolia síntomas. También le solicitará neela muestra de orina. La muestra de orina se analiza para detectar bacterias y glóbulos blancos de la danuta. Los glóbulos blancos se aki en el organismo para ayudar a combatir las infecciones.   TRATAMIENTO   Por lo general, las infecciones urinarias pueden tratarse con medicamentos. Debido a que la mayoría de las infecciones son causadas por bacterias, por lo general pueden tratarse con antibióticos. La elección del antibiótico y la duración del tratamiento dependerá de magnolia síntomas y el tipo de bacteria causante de la infección.   INSTRUCCIONES PARA EL CUIDADO EN EL HOGAR   · Si le recetaron antibióticos, tómelos exactamente chio arroyo médico le indique. Termine el medicamento aunque se sienta mejor después de vernell tomado sólo algunos.  · Sue gran cantidad de líquido para mantener la orina de zoey bolivar o color amarillo pálido.  · Evite la cafeína, el té y las bebidas gaseosas. Estas sustancias irritan la vejiga.  · Vaciar la vejiga con frecuencia. Evite retener la orina terell  largos períodos.  · Vacíe la vejiga antes y después de tener relaciones sexuales.  · Después de  el intestino, las mujeres deben higienizarse la región perineal desde adelante hacia atrás. Use sólo un papel tissue por vez.  SOLICITE ATENCIÓN MÉDICA SI:   · Siente dolor en la espalda.  · Le sube la fiebre.  · Los síntomas no mejoran luego de 3 días.  SOLICITE ATENCIÓN MÉDICA DE INMEDIATO SI:   · Siente dolor intenso en la espalda o en la kaitlynn inferior del abdomen.  · Comienza a sentir escalofríos.  · Tiene náuseas o vómitos.  · Tiene neela sensación continua de quemazón o molestias al orinar.  ASEGÚRESE DE QUE:   · Comprende estas instrucciones.  · Controlará arroyo enfermedad.  · Solicitará ayuda de inmediato si no mejora o empeora.     Esta información no tiene chio fin reemplazar el consejo del médico. Asegúrese de hacerle al médico cualquier pregunta que tenga.     Document Released: 09/27/2006 Document Revised: 09/11/2013  [employer] [place] Interactive Patient Education ©2016 [employer]

## 2017-10-27 NOTE — ED NOTES
Patient ambulated to room. [place] speaking only. Patient has rash under armpits and down trunk.

## 2017-10-27 NOTE — ED NOTES
D/C home with written and verbal instructions re: Rx, activity, f/u.  Verbalizes understanding. Cab called for patient.  Ambulated out

## 2017-10-29 LAB
BACTERIA UR CULT: NORMAL
SIGNIFICANT IND 70042: NORMAL
SITE SITE: NORMAL
SOURCE SOURCE: NORMAL

## 2017-11-04 NOTE — ED PROVIDER NOTES
"ED Provider Note  CHIEF COMPLAINT  Chief Complaint   Patient presents with   • Rash     x 3 weeks all over body denies pain but does itch    • Blood in Urine     x 1 month since having baby        HPI  Carolina Leong is a 27 y.o. female who presents to the emergency department complaining of a rash all over body and bloody urine ×1 month since having her baby. She states that she's had slight dysuria since she had her baby, no abdominal pain, no fever, shakes, chills, sweats, nausea or vomiting. She also states that she's had a rash over entire body that is not itchy, no difficulty swallowing, no swelling of her tongue, shortness of breath, no cough.    REVIEW OF SYSTEMS  Positives as above. Pertinent negatives include chest pain, abdominal pain, vaginal bleeding, previous allergies.   All other review of systems are negative    PAST MEDICAL HISTORY  Past Medical History:   Diagnosis Date   • BV (bacterial vaginosis) 5/2017       FAMILY HISTORY  Noncontributory    SOCIAL HISTORY  Social History     Social History   • Marital status:      Spouse name: N/A   • Number of children: N/A   • Years of education: N/A     Social History Main Topics   • Smoking status: Never Smoker   • Smokeless tobacco: Never Used   • Alcohol use No      Comment: occ before pregnancy    • Drug use: No   • Sexual activity: Yes     Partners: Male     Birth control/ protection: Condom     Other Topics Concern   • Not on file     Social History Narrative   • No narrative on file       SURGICAL HISTORY  No past surgical history on file.    CURRENT MEDICATIONS  Home Medications    **Home medications have not yet been reviewed for this encounter**         ALLERGIES  No Known Allergies    PHYSICAL EXAM  VITAL SIGNS: /82   Pulse 87   Temp 36.8 °C (98.3 °F) (Temporal)   Resp 16   Ht 1.6 m (5' 3\")   Wt 69.9 kg (154 lb 1.6 oz)   LMP 12/10/2016   SpO2 97%   BMI 27.30 kg/m²    Constitutional: Well developed, Well nourished, " No acute distress, Non-toxic appearance.   Eyes: PERRLA, EOMI, Conjunctiva normal, No discharge.   HENT: no lingual edema  Cardiovascular: Normal heart rate, Normal rhythm, No murmurs, No rubs, No gallops, and intact distal pulses.   Thorax & Lungs:  No respiratory distress, no rales, no rhonchi, No wheezing, No chest wall tenderness.   Abdomen: Bowel sounds normal, Soft, No tenderness, No guarding, No rebound, No pulsatile masses.   Skin: Warm, urticarial rash anterior chest wall, upper x-rays lower extremities, back, no petechia, no purpura   Extremities: Full range of motion, no deformity, no edema.  Neurologic: Alert & oriented x 3, No focal deficits noted, acting appropriately on exam.  Psychiatric: Affect normal for clinical presentation.  Results for orders placed or performed during the hospital encounter of 10/26/17   URINALYSIS,CULTURE IF INDICATED   Result Value Ref Range    Color Yellow     Character Clear     Specific Gravity 1.022 <1.035    Ph 5.5 5.0 - 8.0    Glucose Negative Negative mg/dL    Ketones Negative Negative mg/dL    Protein Negative Negative mg/dL    Bilirubin Negative Negative    Urobilinogen, Urine 1.0 Negative    Nitrite Negative Negative    Leukocyte Esterase Moderate (A) Negative    Occult Blood Trace (A) Negative    Micro Urine Req Microscopic     Culture Indicated Yes UA Culture   URINE CULTURE(NEW)   Result Value Ref Range    Significant Indicator NEG     Source UR     Site      Urine Culture Mixed skin susana 10-50,000 cfu/mL    URINE MICROSCOPIC (W/UA)   Result Value Ref Range    WBC 10-20 (A) /hpf    RBC 0-2 /hpf    Bacteria Negative None /hpf    Epithelial Cells Few /hpf    Hyaline Cast 0-2 /lpf       COURSE & MEDICAL DECISION MAKING  Pertinent Labs & Imaging studies reviewed. (See chart for details)  This is a charring 27-year-old female presents with probably contact dermatitis or allergy or some sort as well as urinary tract infection. I am unsure the etiology of the  patient's rash me she has no evidence of anaphylaxis. She did receive 1 dose of steroids here in the emergency department prescription for the same as below. As her urinary tract infection, she received cefdinir and was following up with primary care physician for further evaluation allergy testing. The patient has no significant respiratory distress, evidence of hemodynamic instability.    Discharge Medication List as of 10/26/2017 11:53 PM      START taking these medications    Details   cefdinir (OMNICEF) 300 MG Cap Take 1 Cap by mouth 2 times a day for 5 days., Disp-10 Cap, R-0, Print Rx Paper      predniSONE (DELTASONE) 20 MG Tab Take 3 Tabs by mouth every day for 5 days., Disp-15 Tab, R-0, Print Rx Paper             FINAL IMPRESSION     1. Urinary tract infection with hematuria, site unspecified    2. Allergic reaction, initial encounter      The patient will return for new or worsening symptoms and is stable at the time of discharge.    The patient is referred to a primary physician for blood pressure management, diabetic screening, and for all other preventative health concerns.    DISPOSITION:  Patient will be discharged home in stable condition.    FOLLOW UP:  [facility], Emergency Dept  [address] [id number] [phone]    If symptoms worsen    [address] [address]  [phone]  Schedule an appointment as soon as possible for a visit in 1 week  As needed      OUTPATIENT MEDICATIONS:  Discharge Medication List as of 10/26/2017 11:53 PM      START taking these medications    Details   cefdinir (OMNICEF) 300 MG Cap Take 1 Cap by mouth 2 times a day for 5 days., Disp-10 Cap, R-0, Print Rx Paper      predniSONE (DELTASONE) 20 MG Tab Take 3 Tabs by mouth every day for 5 days., Disp-15 Tab, R-0, Print Rx Paper                Electronically signed by: Pratik Gramajo, 11/3/2017 9:42 PM

## 2019-12-02 ENCOUNTER — APPOINTMENT (OUTPATIENT)
Dept: RADIOLOGY | Facility: MEDICAL CENTER | Age: 29
DRG: 872 | End: 2019-12-02
Attending: EMERGENCY MEDICINE
Payer: MEDICAID

## 2019-12-02 ENCOUNTER — HOSPITAL ENCOUNTER (INPATIENT)
Facility: MEDICAL CENTER | Age: 29
LOS: 2 days | DRG: 872 | End: 2019-12-04
Attending: EMERGENCY MEDICINE | Admitting: HOSPITALIST
Payer: MEDICAID

## 2019-12-02 DIAGNOSIS — R07.89 OTHER CHEST PAIN: ICD-10-CM

## 2019-12-02 DIAGNOSIS — R51.9 ACUTE NONINTRACTABLE HEADACHE, UNSPECIFIED HEADACHE TYPE: ICD-10-CM

## 2019-12-02 DIAGNOSIS — R00.0 SINUS TACHYCARDIA: ICD-10-CM

## 2019-12-02 PROBLEM — E87.6 HYPOKALEMIA: Status: ACTIVE | Noted: 2019-12-02

## 2019-12-02 PROBLEM — E66.3 OVERWEIGHT (BMI 25.0-29.9): Status: ACTIVE | Noted: 2019-12-02

## 2019-12-02 PROBLEM — A41.9 SEPSIS (HCC): Status: ACTIVE | Noted: 2019-12-02

## 2019-12-02 LAB
ALBUMIN SERPL BCP-MCNC: 4 G/DL (ref 3.2–4.9)
ALBUMIN/GLOB SERPL: 1.1 G/DL
ALP SERPL-CCNC: 103 U/L (ref 30–99)
ALT SERPL-CCNC: 21 U/L (ref 2–50)
ANION GAP SERPL CALC-SCNC: 11 MMOL/L (ref 0–11.9)
APPEARANCE UR: CLEAR
APTT PPP: 31 SEC (ref 24.7–36)
AST SERPL-CCNC: 17 U/L (ref 12–45)
BACTERIA #/AREA URNS HPF: NEGATIVE /HPF
BASOPHILS # BLD AUTO: 0.1 % (ref 0–1.8)
BASOPHILS # BLD: 0.01 K/UL (ref 0–0.12)
BILIRUB SERPL-MCNC: 0.8 MG/DL (ref 0.1–1.5)
BILIRUB UR QL STRIP.AUTO: NEGATIVE
BUN SERPL-MCNC: 10 MG/DL (ref 8–22)
CALCIUM SERPL-MCNC: 8.4 MG/DL (ref 8.5–10.5)
CHLORIDE SERPL-SCNC: 103 MMOL/L (ref 96–112)
CO2 SERPL-SCNC: 22 MMOL/L (ref 20–33)
COLOR UR: ABNORMAL
CREAT SERPL-MCNC: 0.52 MG/DL (ref 0.5–1.4)
D DIMER PPP IA.FEU-MCNC: 0.61 UG/ML (FEU) (ref 0–0.5)
EKG IMPRESSION: NORMAL
EOSINOPHIL # BLD AUTO: 0 K/UL (ref 0–0.51)
EOSINOPHIL NFR BLD: 0 % (ref 0–6.9)
EPI CELLS #/AREA URNS HPF: ABNORMAL /HPF
ERYTHROCYTE [DISTWIDTH] IN BLOOD BY AUTOMATED COUNT: 41 FL (ref 35.9–50)
GLOBULIN SER CALC-MCNC: 3.6 G/DL (ref 1.9–3.5)
GLUCOSE SERPL-MCNC: 118 MG/DL (ref 65–99)
GLUCOSE UR STRIP.AUTO-MCNC: NEGATIVE MG/DL
HCG SERPL QL: NEGATIVE
HCT VFR BLD AUTO: 37 % (ref 37–47)
HGB BLD-MCNC: 12.4 G/DL (ref 12–16)
HYALINE CASTS #/AREA URNS LPF: ABNORMAL /LPF
IMM GRANULOCYTES # BLD AUTO: 0.04 K/UL (ref 0–0.11)
IMM GRANULOCYTES NFR BLD AUTO: 0.3 % (ref 0–0.9)
INR PPP: 1.11 (ref 0.87–1.13)
INR PPP: 1.15 (ref 0.87–1.13)
KETONES UR STRIP.AUTO-MCNC: 15 MG/DL
LACTATE BLD-SCNC: 1.1 MMOL/L (ref 0.5–2)
LEUKOCYTE ESTERASE UR QL STRIP.AUTO: NEGATIVE
LIPASE SERPL-CCNC: 12 U/L (ref 11–82)
LYMPHOCYTES # BLD AUTO: 0.88 K/UL (ref 1–4.8)
LYMPHOCYTES NFR BLD: 6.9 % (ref 22–41)
MAGNESIUM SERPL-MCNC: 1.6 MG/DL (ref 1.5–2.5)
MCH RBC QN AUTO: 28.7 PG (ref 27–33)
MCHC RBC AUTO-ENTMCNC: 33.5 G/DL (ref 33.6–35)
MCV RBC AUTO: 85.6 FL (ref 81.4–97.8)
MICRO URNS: ABNORMAL
MONOCYTES # BLD AUTO: 0.71 K/UL (ref 0–0.85)
MONOCYTES NFR BLD AUTO: 5.6 % (ref 0–13.4)
MUCOUS THREADS #/AREA URNS HPF: ABNORMAL /HPF
NEUTROPHILS # BLD AUTO: 11.05 K/UL (ref 2–7.15)
NEUTROPHILS NFR BLD: 87.1 % (ref 44–72)
NITRITE UR QL STRIP.AUTO: NEGATIVE
NRBC # BLD AUTO: 0 K/UL
NRBC BLD-RTO: 0 /100 WBC
PH UR STRIP.AUTO: 6.5 [PH] (ref 5–8)
PLATELET # BLD AUTO: 235 K/UL (ref 164–446)
PMV BLD AUTO: 8.8 FL (ref 9–12.9)
POTASSIUM SERPL-SCNC: 3.3 MMOL/L (ref 3.6–5.5)
PROT SERPL-MCNC: 7.6 G/DL (ref 6–8.2)
PROT UR QL STRIP: 30 MG/DL
PROTHROMBIN TIME: 14.6 SEC (ref 12–14.6)
PROTHROMBIN TIME: 15 SEC (ref 12–14.6)
RBC # BLD AUTO: 4.32 M/UL (ref 4.2–5.4)
RBC # URNS HPF: ABNORMAL /HPF
RBC UR QL AUTO: ABNORMAL
SODIUM SERPL-SCNC: 136 MMOL/L (ref 135–145)
SP GR UR STRIP.AUTO: 1.02
UROBILINOGEN UR STRIP.AUTO-MCNC: 2 MG/DL
WBC # BLD AUTO: 12.7 K/UL (ref 4.8–10.8)
WBC #/AREA URNS HPF: ABNORMAL /HPF

## 2019-12-02 PROCEDURE — 93005 ELECTROCARDIOGRAM TRACING: CPT

## 2019-12-02 PROCEDURE — 85730 THROMBOPLASTIN TIME PARTIAL: CPT

## 2019-12-02 PROCEDURE — 81001 URINALYSIS AUTO W/SCOPE: CPT

## 2019-12-02 PROCEDURE — 87491 CHLMYD TRACH DNA AMP PROBE: CPT

## 2019-12-02 PROCEDURE — 84703 CHORIONIC GONADOTROPIN ASSAY: CPT

## 2019-12-02 PROCEDURE — 96365 THER/PROPH/DIAG IV INF INIT: CPT

## 2019-12-02 PROCEDURE — 99223 1ST HOSP IP/OBS HIGH 75: CPT | Performed by: HOSPITALIST

## 2019-12-02 PROCEDURE — 87591 N.GONORRHOEAE DNA AMP PROB: CPT

## 2019-12-02 PROCEDURE — 93005 ELECTROCARDIOGRAM TRACING: CPT | Performed by: EMERGENCY MEDICINE

## 2019-12-02 PROCEDURE — 00JU3ZZ INSPECTION OF SPINAL CANAL, PERCUTANEOUS APPROACH: ICD-10-PCS | Performed by: EMERGENCY MEDICINE

## 2019-12-02 PROCEDURE — 71045 X-RAY EXAM CHEST 1 VIEW: CPT

## 2019-12-02 PROCEDURE — 700102 HCHG RX REV CODE 250 W/ 637 OVERRIDE(OP): Performed by: EMERGENCY MEDICINE

## 2019-12-02 PROCEDURE — 36415 COLL VENOUS BLD VENIPUNCTURE: CPT

## 2019-12-02 PROCEDURE — 87040 BLOOD CULTURE FOR BACTERIA: CPT

## 2019-12-02 PROCEDURE — 70450 CT HEAD/BRAIN W/O DYE: CPT

## 2019-12-02 PROCEDURE — 83735 ASSAY OF MAGNESIUM: CPT

## 2019-12-02 PROCEDURE — 96375 TX/PRO/DX INJ NEW DRUG ADDON: CPT

## 2019-12-02 PROCEDURE — 700111 HCHG RX REV CODE 636 W/ 250 OVERRIDE (IP): Performed by: EMERGENCY MEDICINE

## 2019-12-02 PROCEDURE — 700111 HCHG RX REV CODE 636 W/ 250 OVERRIDE (IP): Performed by: HOSPITALIST

## 2019-12-02 PROCEDURE — 85610 PROTHROMBIN TIME: CPT | Mod: 91

## 2019-12-02 PROCEDURE — A9270 NON-COVERED ITEM OR SERVICE: HCPCS | Performed by: EMERGENCY MEDICINE

## 2019-12-02 PROCEDURE — 700105 HCHG RX REV CODE 258: Performed by: EMERGENCY MEDICINE

## 2019-12-02 PROCEDURE — 99285 EMERGENCY DEPT VISIT HI MDM: CPT

## 2019-12-02 PROCEDURE — 85025 COMPLETE CBC W/AUTO DIFF WBC: CPT

## 2019-12-02 PROCEDURE — 83605 ASSAY OF LACTIC ACID: CPT

## 2019-12-02 PROCEDURE — 770020 HCHG ROOM/CARE - TELE (206)

## 2019-12-02 PROCEDURE — 85379 FIBRIN DEGRADATION QUANT: CPT

## 2019-12-02 PROCEDURE — 700105 HCHG RX REV CODE 258: Performed by: HOSPITALIST

## 2019-12-02 PROCEDURE — 71275 CT ANGIOGRAPHY CHEST: CPT

## 2019-12-02 PROCEDURE — 83690 ASSAY OF LIPASE: CPT

## 2019-12-02 PROCEDURE — 80053 COMPREHEN METABOLIC PANEL: CPT

## 2019-12-02 PROCEDURE — 700117 HCHG RX CONTRAST REV CODE 255: Performed by: EMERGENCY MEDICINE

## 2019-12-02 RX ORDER — MORPHINE SULFATE 10 MG/ML
10 INJECTION, SOLUTION INTRAMUSCULAR; INTRAVENOUS ONCE
Status: DISCONTINUED | OUTPATIENT
Start: 2019-12-02 | End: 2019-12-02

## 2019-12-02 RX ORDER — POLYETHYLENE GLYCOL 3350 17 G/17G
1 POWDER, FOR SOLUTION ORAL
Status: DISCONTINUED | OUTPATIENT
Start: 2019-12-02 | End: 2019-12-04 | Stop reason: HOSPADM

## 2019-12-02 RX ORDER — PROMETHAZINE HYDROCHLORIDE 25 MG/1
12.5-25 TABLET ORAL EVERY 4 HOURS PRN
Status: DISCONTINUED | OUTPATIENT
Start: 2019-12-02 | End: 2019-12-04 | Stop reason: HOSPADM

## 2019-12-02 RX ORDER — ONDANSETRON 4 MG/1
4 TABLET, ORALLY DISINTEGRATING ORAL EVERY 4 HOURS PRN
Status: DISCONTINUED | OUTPATIENT
Start: 2019-12-02 | End: 2019-12-04 | Stop reason: HOSPADM

## 2019-12-02 RX ORDER — DIPHENHYDRAMINE HYDROCHLORIDE 50 MG/ML
12.5 INJECTION INTRAMUSCULAR; INTRAVENOUS ONCE
Status: COMPLETED | OUTPATIENT
Start: 2019-12-02 | End: 2019-12-02

## 2019-12-02 RX ORDER — PROMETHAZINE HYDROCHLORIDE 12.5 MG/1
12.5-25 SUPPOSITORY RECTAL EVERY 4 HOURS PRN
Status: DISCONTINUED | OUTPATIENT
Start: 2019-12-02 | End: 2019-12-04 | Stop reason: HOSPADM

## 2019-12-02 RX ORDER — AMOXICILLIN 250 MG
2 CAPSULE ORAL 2 TIMES DAILY
Status: DISCONTINUED | OUTPATIENT
Start: 2019-12-02 | End: 2019-12-04 | Stop reason: HOSPADM

## 2019-12-02 RX ORDER — ACETAMINOPHEN 325 MG/1
650 TABLET ORAL EVERY 6 HOURS PRN
Status: DISCONTINUED | OUTPATIENT
Start: 2019-12-02 | End: 2019-12-04 | Stop reason: HOSPADM

## 2019-12-02 RX ORDER — ACETAMINOPHEN 325 MG/1
650 TABLET ORAL ONCE
Status: COMPLETED | OUTPATIENT
Start: 2019-12-02 | End: 2019-12-02

## 2019-12-02 RX ORDER — ONDANSETRON 2 MG/ML
4 INJECTION INTRAMUSCULAR; INTRAVENOUS EVERY 4 HOURS PRN
Status: DISCONTINUED | OUTPATIENT
Start: 2019-12-02 | End: 2019-12-04 | Stop reason: HOSPADM

## 2019-12-02 RX ORDER — ACETAMINOPHEN 325 MG/1
325 TABLET ORAL EVERY 6 HOURS PRN
COMMUNITY

## 2019-12-02 RX ORDER — MORPHINE SULFATE 4 MG/ML
2 INJECTION, SOLUTION INTRAMUSCULAR; INTRAVENOUS
Status: DISCONTINUED | OUTPATIENT
Start: 2019-12-02 | End: 2019-12-04 | Stop reason: HOSPADM

## 2019-12-02 RX ORDER — PROCHLORPERAZINE EDISYLATE 5 MG/ML
5-10 INJECTION INTRAMUSCULAR; INTRAVENOUS EVERY 4 HOURS PRN
Status: DISCONTINUED | OUTPATIENT
Start: 2019-12-02 | End: 2019-12-04 | Stop reason: HOSPADM

## 2019-12-02 RX ORDER — SODIUM CHLORIDE, SODIUM LACTATE, POTASSIUM CHLORIDE, CALCIUM CHLORIDE 600; 310; 30; 20 MG/100ML; MG/100ML; MG/100ML; MG/100ML
INJECTION, SOLUTION INTRAVENOUS CONTINUOUS
Status: DISCONTINUED | OUTPATIENT
Start: 2019-12-02 | End: 2019-12-04 | Stop reason: HOSPADM

## 2019-12-02 RX ORDER — OXYCODONE HYDROCHLORIDE 5 MG/1
5 TABLET ORAL
Status: DISCONTINUED | OUTPATIENT
Start: 2019-12-02 | End: 2019-12-04 | Stop reason: HOSPADM

## 2019-12-02 RX ORDER — CLONIDINE HYDROCHLORIDE 0.1 MG/1
0.1 TABLET ORAL EVERY 6 HOURS PRN
Status: DISCONTINUED | OUTPATIENT
Start: 2019-12-02 | End: 2019-12-04 | Stop reason: HOSPADM

## 2019-12-02 RX ORDER — PROCHLORPERAZINE EDISYLATE 5 MG/ML
10 INJECTION INTRAMUSCULAR; INTRAVENOUS ONCE
Status: COMPLETED | OUTPATIENT
Start: 2019-12-02 | End: 2019-12-02

## 2019-12-02 RX ORDER — HYDROMORPHONE HYDROCHLORIDE 1 MG/ML
1 INJECTION, SOLUTION INTRAMUSCULAR; INTRAVENOUS; SUBCUTANEOUS ONCE
Status: DISCONTINUED | OUTPATIENT
Start: 2019-12-02 | End: 2019-12-02

## 2019-12-02 RX ORDER — SODIUM CHLORIDE 9 MG/ML
1000 INJECTION, SOLUTION INTRAVENOUS ONCE
Status: COMPLETED | OUTPATIENT
Start: 2019-12-02 | End: 2019-12-02

## 2019-12-02 RX ORDER — HYDROMORPHONE HYDROCHLORIDE 1 MG/ML
0.5 INJECTION, SOLUTION INTRAMUSCULAR; INTRAVENOUS; SUBCUTANEOUS ONCE
Status: COMPLETED | OUTPATIENT
Start: 2019-12-02 | End: 2019-12-02

## 2019-12-02 RX ORDER — OXYCODONE HYDROCHLORIDE 5 MG/1
2.5 TABLET ORAL
Status: DISCONTINUED | OUTPATIENT
Start: 2019-12-02 | End: 2019-12-04 | Stop reason: HOSPADM

## 2019-12-02 RX ORDER — SODIUM CHLORIDE, SODIUM LACTATE, POTASSIUM CHLORIDE, AND CALCIUM CHLORIDE .6; .31; .03; .02 G/100ML; G/100ML; G/100ML; G/100ML
500 INJECTION, SOLUTION INTRAVENOUS
Status: DISCONTINUED | OUTPATIENT
Start: 2019-12-02 | End: 2019-12-04 | Stop reason: HOSPADM

## 2019-12-02 RX ORDER — BISACODYL 10 MG
10 SUPPOSITORY, RECTAL RECTAL
Status: DISCONTINUED | OUTPATIENT
Start: 2019-12-02 | End: 2019-12-04 | Stop reason: HOSPADM

## 2019-12-02 RX ADMIN — IOHEXOL 50 ML: 350 INJECTION, SOLUTION INTRAVENOUS at 18:31

## 2019-12-02 RX ADMIN — ACETAMINOPHEN 650 MG: 325 TABLET, FILM COATED ORAL at 22:30

## 2019-12-02 RX ADMIN — CEFTRIAXONE SODIUM 2 G: 2 INJECTION, POWDER, FOR SOLUTION INTRAMUSCULAR; INTRAVENOUS at 23:30

## 2019-12-02 RX ADMIN — SODIUM CHLORIDE 1000 ML: 9 INJECTION, SOLUTION INTRAVENOUS at 15:15

## 2019-12-02 RX ADMIN — LIDOCAINE HYDROCHLORIDE 30 ML: 20 SOLUTION OROPHARYNGEAL at 12:27

## 2019-12-02 RX ADMIN — PROCHLORPERAZINE EDISYLATE 10 MG: 5 INJECTION INTRAMUSCULAR; INTRAVENOUS at 12:29

## 2019-12-02 RX ADMIN — HYDROMORPHONE HYDROCHLORIDE 0.5 MG: 1 INJECTION, SOLUTION INTRAMUSCULAR; INTRAVENOUS; SUBCUTANEOUS at 12:28

## 2019-12-02 RX ADMIN — DIPHENHYDRAMINE HYDROCHLORIDE 12.5 MG: 50 INJECTION INTRAMUSCULAR; INTRAVENOUS at 12:28

## 2019-12-02 RX ADMIN — SODIUM CHLORIDE 1000 ML: 9 INJECTION, SOLUTION INTRAVENOUS at 17:00

## 2019-12-02 ASSESSMENT — ENCOUNTER SYMPTOMS
CARDIOVASCULAR NEGATIVE: 1
NECK PAIN: 0
NAUSEA: 0
DIARRHEA: 0
NEUROLOGICAL NEGATIVE: 1
SHORTNESS OF BREATH: 1
RESPIRATORY NEGATIVE: 1
ABDOMINAL PAIN: 0
VOMITING: 0
EYES NEGATIVE: 1
PSYCHIATRIC NEGATIVE: 1
GASTROINTESTINAL NEGATIVE: 1
CHILLS: 1
COUGH: 1
FEVER: 1
HEADACHES: 1

## 2019-12-02 NOTE — ED NOTES
Language line  services used to rounded on pt, discussed POC, updated on wait status, answered questions, addressed needs, ensured call light within reach. Provided emotional support for pt.

## 2019-12-02 NOTE — ED PROVIDER NOTES
ED Provider Note    Scribed for Lalit Crowder M.D. by Roberto Morales. 12/2/2019  11:38 AM    Primary care provider: None noted  Means of arrival: Walk in  History obtained from: Patient  History limited by: History Obtained by me Directly in Marshallese    CHIEF COMPLAINT  Chief Complaint   Patient presents with   • Headache   • Abdominal Pain       HPI  Carolina Leong is a 29 y.o. female who presents to the Emergency Department for evaluation of severe temporal headache onset 3 days. She rates the headache pain at a 10/10. She notes additional symptoms of abdominal pain, and mild fever, but denies rhinorrhea, sore throat, tooth pain, jaw pain, diarrhea, gastroesophageal reflux, nausea or vomiting. She denies history of severe headache or migraine similar to her symptoms today. She states she medicated with Tylenol with minimal alleviation. She denies alcohol abuse, drug abuse, or tobacco abuse. She denies taking any NSAID's.     REVIEW OF SYSTEMS  Pertinent positives include: temporal headache, abdominal pain, and mild fever.   Pertinent negatives include: rhinorrhea, sore throat, tooth pain, jaw pain, diarrhea, gastroesophageal reflux, nausea or vomiting.  10+ systems reviewed and negative.     PAST MEDICAL HISTORY  Past Medical History:   Diagnosis Date   • BV (bacterial vaginosis) 5/2017       FAMILY HISTORY  No family history on file.    SOCIAL HISTORY  Social History     Tobacco Use   • Smoking status: Never Smoker   • Smokeless tobacco: Never Used   Substance Use Topics   • Alcohol use: No     Comment: occ before pregnancy    • Drug use: No     Social History     Substance and Sexual Activity   Drug Use No       SURGICAL HISTORY  History reviewed. No pertinent surgical history.    CURRENT MEDICATIONS  Home Medications     Reviewed by Schuyler B Collett, R.N. (Registered Nurse) on 12/02/19 at 1006  Med List Status: <None>   Medication Last Dose Status   ibuprofen (MOTRIN) 600 MG Tab  Active   Prenatal  "Vit-Fe Sulfate-FA (PRENATAL VITAMIN PO)  Active                ALLERGIES  No Known Allergies    PHYSICAL EXAM  VITAL SIGNS: /77   Pulse (!) 139   Temp 36.2 °C (97.2 °F) (Temporal)   Resp 15   Ht 1.6 m (5' 3\")   Wt 76.6 kg (168 lb 14 oz)   SpO2 98%   BMI 29.91 kg/m²    Constitutional: Well developed, Well nourished.  HENT: Normocephalic, atraumatic, bilateral external ears normal, oropharynx moist, No exudates or erythema.   Eyes: PERRLA, conjunctiva pink, no scleral icterus.   Cardiovascular: Tachycardic rate and rhythm. No murmurs, rubs or gallops.   Respiratory: Lungs clear to auscultation bilaterally. No wheezes, rales, or rhonchi.   Abdominal:  Abdomen soft, non-tender, non distended. No rebound, or guarding.    Skin: No erythema, no rash.   Genitourinary: No costovertebral angle tenderness.   Musculoskeletal: No edema.   Neurologic: Alert & oriented x 3, cranial nerves 2-12 intact by passive exam.  No focal deficit noted.  Psychiatric: Affect normal, Judgment normal, Mood normal.     DIFFERENTIAL DIAGNOSIS:  Patient's differential includes    EKG Interpretation:  Interpreted by me    Rhythm:  Sinus tachycardia  Rate: 135  Axis: normal  Ectopy: none  Conduction: normal  ST Segments: no acute change  T Waves: no acute change  Q Waves: none  Clinical Impression: Normal EKG without acute changes     RADIOLOGY/PROCEDURES  CT-CTA CHEST PULMONARY ARTERY W/ RECONS   Final Result      1.  No evidence of pulmonary embolus.      2.  Bibasilar atelectasis.      3.  Fatty liver.      CT-HEAD W/O   Final Result      No intracranial hemorrhage identified. Depending on clinical findings, MRI would be a consideration for further evaluation.   No mass effect.   No hydrocephalus.      DX-CHEST-LIMITED (1 VIEW)   Final Result      1.  No acute cardiac or pulmonary abnormalities are identified.      DX-LUMBAR PUNCTURE FOR DIAGNOSIS    (Results Pending)     Radiologist interpretation have been reviewed by me. "     LABORATORY:  Results for orders placed or performed during the hospital encounter of 12/02/19   CBC WITH DIFFERENTIAL   Result Value Ref Range    WBC 12.7 (H) 4.8 - 10.8 K/uL    RBC 4.32 4.20 - 5.40 M/uL    Hemoglobin 12.4 12.0 - 16.0 g/dL    Hematocrit 37.0 37.0 - 47.0 %    MCV 85.6 81.4 - 97.8 fL    MCH 28.7 27.0 - 33.0 pg    MCHC 33.5 (L) 33.6 - 35.0 g/dL    RDW 41.0 35.9 - 50.0 fL    Platelet Count 235 164 - 446 K/uL    MPV 8.8 (L) 9.0 - 12.9 fL    Neutrophils-Polys 87.10 (H) 44.00 - 72.00 %    Lymphocytes 6.90 (L) 22.00 - 41.00 %    Monocytes 5.60 0.00 - 13.40 %    Eosinophils 0.00 0.00 - 6.90 %    Basophils 0.10 0.00 - 1.80 %    Immature Granulocytes 0.30 0.00 - 0.90 %    Nucleated RBC 0.00 /100 WBC    Neutrophils (Absolute) 11.05 (H) 2.00 - 7.15 K/uL    Lymphs (Absolute) 0.88 (L) 1.00 - 4.80 K/uL    Monos (Absolute) 0.71 0.00 - 0.85 K/uL    Eos (Absolute) 0.00 0.00 - 0.51 K/uL    Baso (Absolute) 0.01 0.00 - 0.12 K/uL    Immature Granulocytes (abs) 0.04 0.00 - 0.11 K/uL    NRBC (Absolute) 0.00 K/uL   Comp Metabolic Panel   Result Value Ref Range    Sodium 136 135 - 145 mmol/L    Potassium 3.3 (L) 3.6 - 5.5 mmol/L    Chloride 103 96 - 112 mmol/L    Co2 22 20 - 33 mmol/L    Anion Gap 11.0 0.0 - 11.9    Glucose 118 (H) 65 - 99 mg/dL    Bun 10 8 - 22 mg/dL    Creatinine 0.52 0.50 - 1.40 mg/dL    Calcium 8.4 (L) 8.5 - 10.5 mg/dL    AST(SGOT) 17 12 - 45 U/L    ALT(SGPT) 21 2 - 50 U/L    Alkaline Phosphatase 103 (H) 30 - 99 U/L    Total Bilirubin 0.8 0.1 - 1.5 mg/dL    Albumin 4.0 3.2 - 4.9 g/dL    Total Protein 7.6 6.0 - 8.2 g/dL    Globulin 3.6 (H) 1.9 - 3.5 g/dL    A-G Ratio 1.1 g/dL   LIPASE   Result Value Ref Range    Lipase 12 11 - 82 U/L   URINALYSIS,CULTURE IF INDICATED   Result Value Ref Range    Color DK Yellow     Character Clear     Specific Gravity 1.022 <1.035    Ph 6.5 5.0 - 8.0    Glucose Negative Negative mg/dL    Ketones 15 (A) Negative mg/dL    Protein 30 (A) Negative mg/dL    Bilirubin  Negative Negative    Urobilinogen, Urine 2.0 Negative    Nitrite Negative Negative    Leukocyte Esterase Negative Negative    Occult Blood Large (A) Negative    Micro Urine Req Microscopic    HCG QUAL SERUM   Result Value Ref Range    Beta-Hcg Qualitative Serum Negative Negative   ESTIMATED GFR   Result Value Ref Range    GFR If African American >60 >60 mL/min/1.73 m 2    GFR If Non African American >60 >60 mL/min/1.73 m 2   D-DIMER   Result Value Ref Range    D-Dimer Screen 0.61 (H) 0.00 - 0.50 ug/mL (FEU)   LACTIC ACID   Result Value Ref Range    Lactic Acid 1.1 0.5 - 2.0 mmol/L   PROTHROMBIN TIME (INR)   Result Value Ref Range    PT 14.6 12.0 - 14.6 sec    INR 1.11 0.87 - 1.13   APTT   Result Value Ref Range    APTT 31.0 24.7 - 36.0 sec   URINE MICROSCOPIC (W/UA)   Result Value Ref Range    WBC 0-2 /hpf    RBC 5-10 (A) /hpf    Bacteria Negative None /hpf    Epithelial Cells Few /hpf    Mucous Threads Few /hpf    Hyaline Cast 0-2 /lpf   EKG (NOW)   Result Value Ref Range    Report       [facility] Emergency Dept.    Test Date:  2019  Pt Name:    GWEN PAREKH      Department: ER  MRN:        [id number]                      Room:  Gender:     Female                       Technician: [id number]  :        1990                   Requested By:ER TRIAGE PROTOCOL  Order #:    [id number]                    Reading MD:    Measurements  Intervals                                Axis  Rate:       131                          P:          40  MN:         120                          QRS:        93  QRSD:       83                           T:          -18  QT:         295  QTc:        436    Interpretive Statements  Sinus tachycardia  Borderline right axis deviation  Borderline T abnormalities, anterior leads  Baseline wander in lead(s) II,III,aVF  Compared to ECG 07/15/2014 20:00:17  T-wave abnormality now present  Sinus bradycardia no longer present        CT-CTA CHEST PULMONARY ARTERY W/  RECONS   Final Result      1.  No evidence of pulmonary embolus.      2.  Bibasilar atelectasis.      3.  Fatty liver.      CT-HEAD W/O   Final Result      No intracranial hemorrhage identified. Depending on clinical findings, MRI would be a consideration for further evaluation.   No mass effect.   No hydrocephalus.      DX-CHEST-LIMITED (1 VIEW)   Final Result      1.  No acute cardiac or pulmonary abnormalities are identified.      DX-LUMBAR PUNCTURE FOR DIAGNOSIS    (Results Pending)       Lab results reviewed by me.     INTERVENTIONS:  Medications   acetaminophen (TYLENOL) tablet 650 mg (has no administration in time range)   hyoscyamine-maalox plus-lidocaine viscous (GI COCKTAIL) oral susp 30 mL (30 mL Oral Given 12/2/19 1227)   HYDROmorphone pf (DILAUDID) injection 0.5 mg (0.5 mg Intravenous Given 12/2/19 1228)   prochlorperazine (COMPAZINE) injection 10 mg (10 mg Intravenous Given 12/2/19 1229)   diphenhydrAMINE (BENADRYL) injection 12.5 mg (12.5 mg Intravenous Given 12/2/19 1228)   NS infusion 1,000 mL (0 mL Intravenous Stopped 12/2/19 1633)   NS infusion 1,000 mL (0 mL Intravenous Stopped 12/2/19 1706)   iohexol (OMNIPAQUE) 350 mg/mL (50 mL Intravenous Given 12/2/19 1831)     Response: The patient's tachycardia did not respond to the  fluid bolus    ED COURSE:  Nursing notes, VS, PMSFHx reviewed in chart.     11:38 AM - Patient seen and examined at bedside. Patient will be treated with Dilaudid 0.5 mg injection, GI Cocktail 20 mL suspension, Compazine 10 mg injection, Benadryl 12.5 mg injection for her symptoms. Ordered EKG, DX-Chest, CT-Head to evaluate.     1:46 PM - I reevaluated the patient at bedside. She reports feeling improved after Dilaudid administration.     Lumbar puncture attempt the area of the lumbar spine was prepped with iodine in the sitting position.  He was prepped and draped the area of the L2-3 region was anesthetized 1% lidocaine.  I made multiple attempts was unsuccessful and the  procedure had to be aborted    MEDICAL DECISION MAKING:  Patient presents with 3 days of symptoms which include subjective fever, nonproductive cough frontal headache mild chest pain also some mild shortness of breath.  Initial work-up begun by Dr. Bonilla showed a CT head that was normal.  D-dimer had been ordered due to the patient's complaint of chest pain and her tachycardia.  D-dimer was elevated so CTA of the chest was obtained.  There was no sign of DVT.  Patient was given a liter of fluid which did not help her persistent tachycardia which remained between 1/31/1950.  Patient is mildly short of breath.    Patient also has frontal headache.  With the febrile illness in the history frontal headache tachycardia and negative work-up it was elected to do LP PE to rule out a viral meningitis.  The LP was unsuccessful.    The patient has no documented fever she has headache no nuchal rigidity.  Patient has a normal mental status normal neuro exam.  Radiology is requesting to do the LP in the morning which I think is reasonable just for completeness of work-up.    Due to the patient's unexplained tachycardia she will be hospitalized for observation and further work-up.  I have contacted the hospitalist for hospitalization    PLAN:  New Prescriptions    No medications on file       CONDITION: Guarded.     FINAL IMPRESSION  1. Other chest pain    2. Sinus tachycardia    3. Acute intractable headache, unspecified headache type          I, Roberto Morales (Vineet), am scribing for, and in the presence of, Lalit Crowder M.D..    Electronically signed by: Roberto Morales (Vineet), 12/2/2019    ILalit M.D. personally performed the services described in this documentation, as scribed by Roberto Morales in my presence, and it is both accurate and complete. C.     The note accurately reflects work and decisions made by me.  Marc Cadet  12/2/2019  10:25 PM

## 2019-12-02 NOTE — ED TRIAGE NOTES
Carolina Leong presents to triage reporting 10/10 Headache and abd pain x3 days. Pt denies associated n/v/d. Pt states she took tylenol last night w/o relief.      Pt Urdu speaking. NAD noted. Pt educated of triage process, placed back in waiting area pending room assignment.

## 2019-12-03 ENCOUNTER — APPOINTMENT (OUTPATIENT)
Dept: RADIOLOGY | Facility: MEDICAL CENTER | Age: 29
DRG: 872 | End: 2019-12-03
Attending: EMERGENCY MEDICINE
Payer: MEDICAID

## 2019-12-03 LAB
ANION GAP SERPL CALC-SCNC: 10 MMOL/L (ref 0–11.9)
BASOPHILS # BLD AUTO: 0.1 % (ref 0–1.8)
BASOPHILS # BLD: 0.01 K/UL (ref 0–0.12)
BUN SERPL-MCNC: 8 MG/DL (ref 8–22)
CALCIUM SERPL-MCNC: 7.8 MG/DL (ref 8.5–10.5)
CHLORIDE SERPL-SCNC: 106 MMOL/L (ref 96–112)
CO2 SERPL-SCNC: 21 MMOL/L (ref 20–33)
CREAT SERPL-MCNC: 0.46 MG/DL (ref 0.5–1.4)
EOSINOPHIL # BLD AUTO: 0.01 K/UL (ref 0–0.51)
EOSINOPHIL NFR BLD: 0.1 % (ref 0–6.9)
ERYTHROCYTE [DISTWIDTH] IN BLOOD BY AUTOMATED COUNT: 41.5 FL (ref 35.9–50)
FLUAV RNA SPEC QL NAA+PROBE: NEGATIVE
FLUBV RNA SPEC QL NAA+PROBE: NEGATIVE
GLUCOSE SERPL-MCNC: 108 MG/DL (ref 65–99)
HCT VFR BLD AUTO: 34.8 % (ref 37–47)
HGB BLD-MCNC: 11.6 G/DL (ref 12–16)
IMM GRANULOCYTES # BLD AUTO: 0.03 K/UL (ref 0–0.11)
IMM GRANULOCYTES NFR BLD AUTO: 0.3 % (ref 0–0.9)
LACTATE BLD-SCNC: 1 MMOL/L (ref 0.5–2)
LACTATE BLD-SCNC: 1.1 MMOL/L (ref 0.5–2)
LYMPHOCYTES # BLD AUTO: 1.26 K/UL (ref 1–4.8)
LYMPHOCYTES NFR BLD: 14.1 % (ref 22–41)
MCH RBC QN AUTO: 28.9 PG (ref 27–33)
MCHC RBC AUTO-ENTMCNC: 33.3 G/DL (ref 33.6–35)
MCV RBC AUTO: 86.6 FL (ref 81.4–97.8)
MONOCYTES # BLD AUTO: 0.78 K/UL (ref 0–0.85)
MONOCYTES NFR BLD AUTO: 8.8 % (ref 0–13.4)
NEUTROPHILS # BLD AUTO: 6.82 K/UL (ref 2–7.15)
NEUTROPHILS NFR BLD: 76.6 % (ref 44–72)
NRBC # BLD AUTO: 0 K/UL
NRBC BLD-RTO: 0 /100 WBC
PLATELET # BLD AUTO: 204 K/UL (ref 164–446)
PMV BLD AUTO: 9.1 FL (ref 9–12.9)
POTASSIUM SERPL-SCNC: 3.2 MMOL/L (ref 3.6–5.5)
RBC # BLD AUTO: 4.02 M/UL (ref 4.2–5.4)
SODIUM SERPL-SCNC: 137 MMOL/L (ref 135–145)
WBC # BLD AUTO: 8.9 K/UL (ref 4.8–10.8)

## 2019-12-03 PROCEDURE — 90471 IMMUNIZATION ADMIN: CPT

## 2019-12-03 PROCEDURE — 700105 HCHG RX REV CODE 258: Performed by: HOSPITALIST

## 2019-12-03 PROCEDURE — 80048 BASIC METABOLIC PNL TOTAL CA: CPT

## 2019-12-03 PROCEDURE — 87502 INFLUENZA DNA AMP PROBE: CPT

## 2019-12-03 PROCEDURE — 700111 HCHG RX REV CODE 636 W/ 250 OVERRIDE (IP): Performed by: HOSPITALIST

## 2019-12-03 PROCEDURE — 700102 HCHG RX REV CODE 250 W/ 637 OVERRIDE(OP): Performed by: HOSPITALIST

## 2019-12-03 PROCEDURE — 36415 COLL VENOUS BLD VENIPUNCTURE: CPT

## 2019-12-03 PROCEDURE — 3E02340 INTRODUCTION OF INFLUENZA VACCINE INTO MUSCLE, PERCUTANEOUS APPROACH: ICD-10-PCS | Performed by: HOSPITALIST

## 2019-12-03 PROCEDURE — 770020 HCHG ROOM/CARE - TELE (206)

## 2019-12-03 PROCEDURE — 85025 COMPLETE CBC W/AUTO DIFF WBC: CPT

## 2019-12-03 PROCEDURE — 99233 SBSQ HOSP IP/OBS HIGH 50: CPT | Performed by: HOSPITALIST

## 2019-12-03 PROCEDURE — 90686 IIV4 VACC NO PRSV 0.5 ML IM: CPT | Performed by: HOSPITALIST

## 2019-12-03 PROCEDURE — A9270 NON-COVERED ITEM OR SERVICE: HCPCS | Performed by: HOSPITALIST

## 2019-12-03 PROCEDURE — 83605 ASSAY OF LACTIC ACID: CPT

## 2019-12-03 RX ORDER — POTASSIUM CHLORIDE 20 MEQ/1
20 TABLET, EXTENDED RELEASE ORAL DAILY
Status: DISCONTINUED | OUTPATIENT
Start: 2019-12-03 | End: 2019-12-04 | Stop reason: HOSPADM

## 2019-12-03 RX ORDER — IBUPROFEN 600 MG/1
600 TABLET ORAL EVERY 6 HOURS PRN
Status: DISCONTINUED | OUTPATIENT
Start: 2019-12-03 | End: 2019-12-04 | Stop reason: HOSPADM

## 2019-12-03 RX ORDER — MAGNESIUM SULFATE HEPTAHYDRATE 40 MG/ML
2 INJECTION, SOLUTION INTRAVENOUS ONCE
Status: COMPLETED | OUTPATIENT
Start: 2019-12-03 | End: 2019-12-03

## 2019-12-03 RX ORDER — BUTALBITAL, ACETAMINOPHEN AND CAFFEINE 50; 325; 40 MG/1; MG/1; MG/1
1 TABLET ORAL EVERY 6 HOURS PRN
Status: DISCONTINUED | OUTPATIENT
Start: 2019-12-03 | End: 2019-12-03

## 2019-12-03 RX ORDER — DIPHENHYDRAMINE HCL 25 MG
25 TABLET ORAL ONCE
Status: COMPLETED | OUTPATIENT
Start: 2019-12-03 | End: 2019-12-04

## 2019-12-03 RX ADMIN — POTASSIUM CHLORIDE 20 MEQ: 1500 TABLET, EXTENDED RELEASE ORAL at 16:19

## 2019-12-03 RX ADMIN — SODIUM CHLORIDE, POTASSIUM CHLORIDE, SODIUM LACTATE AND CALCIUM CHLORIDE: 600; 310; 30; 20 INJECTION, SOLUTION INTRAVENOUS at 05:23

## 2019-12-03 RX ADMIN — ACETAMINOPHEN 650 MG: 325 TABLET, FILM COATED ORAL at 16:06

## 2019-12-03 RX ADMIN — MAGNESIUM SULFATE IN WATER 2 G: 40 INJECTION, SOLUTION INTRAVENOUS at 16:18

## 2019-12-03 RX ADMIN — IBUPROFEN 600 MG: 600 TABLET ORAL at 20:10

## 2019-12-03 RX ADMIN — POTASSIUM BICARBONATE 25 MEQ: 978 TABLET, EFFERVESCENT ORAL at 05:29

## 2019-12-03 RX ADMIN — INFLUENZA A VIRUS A/BRISBANE/02/2018 IVR-190 (H1N1) ANTIGEN (FORMALDEHYDE INACTIVATED), INFLUENZA A VIRUS A/KANSAS/14/2017 X-327 (H3N2) ANTIGEN (FORMALDEHYDE INACTIVATED), INFLUENZA B VIRUS B/PHUKET/3073/2013 ANTIGEN (FORMALDEHYDE INACTIVATED), AND INFLUENZA B VIRUS B/MARYLAND/15/2016 BX-69A ANTIGEN (FORMALDEHYDE INACTIVATED) 0.5 ML: 15; 15; 15; 15 INJECTION, SUSPENSION INTRAMUSCULAR at 16:06

## 2019-12-03 RX ADMIN — CEFTRIAXONE SODIUM 2 G: 2 INJECTION, POWDER, FOR SOLUTION INTRAMUSCULAR; INTRAVENOUS at 23:37

## 2019-12-03 RX ADMIN — SODIUM CHLORIDE, POTASSIUM CHLORIDE, SODIUM LACTATE AND CALCIUM CHLORIDE: 600; 310; 30; 20 INJECTION, SOLUTION INTRAVENOUS at 16:08

## 2019-12-03 ASSESSMENT — LIFESTYLE VARIABLES
EVER HAD A DRINK FIRST THING IN THE MORNING TO STEADY YOUR NERVES TO GET RID OF A HANGOVER: NO
TOTAL SCORE: 0
ALCOHOL_USE: NO
AVERAGE NUMBER OF DAYS PER WEEK YOU HAVE A DRINK CONTAINING ALCOHOL: 0
EVER FELT BAD OR GUILTY ABOUT YOUR DRINKING: NO
ON A TYPICAL DAY WHEN YOU DRINK ALCOHOL HOW MANY DRINKS DO YOU HAVE: 0
SUBSTANCE_ABUSE: 0
HAVE PEOPLE ANNOYED YOU BY CRITICIZING YOUR DRINKING: NO
HAVE YOU EVER FELT YOU SHOULD CUT DOWN ON YOUR DRINKING: NO
TOTAL SCORE: 0
CONSUMPTION TOTAL: NEGATIVE
TOTAL SCORE: 0
HOW MANY TIMES IN THE PAST YEAR HAVE YOU HAD 5 OR MORE DRINKS IN A DAY: 0

## 2019-12-03 ASSESSMENT — ENCOUNTER SYMPTOMS
COUGH: 1
HEADACHES: 1
SHORTNESS OF BREATH: 0
EYE REDNESS: 0
STRIDOR: 0
SENSORY CHANGE: 0
PHOTOPHOBIA: 0
NECK PAIN: 0
FLANK PAIN: 0
FEVER: 1
HALLUCINATIONS: 0
DIARRHEA: 0
BACK PAIN: 0
FOCAL WEAKNESS: 0
VOMITING: 0
WEAKNESS: 0
PALPITATIONS: 0
CHILLS: 1
DIAPHORESIS: 0
WHEEZING: 0
ABDOMINAL PAIN: 0
TREMORS: 0
MYALGIAS: 1
SPUTUM PRODUCTION: 0
SPEECH CHANGE: 0
EYE DISCHARGE: 0

## 2019-12-03 ASSESSMENT — PATIENT HEALTH QUESTIONNAIRE - PHQ9
1. LITTLE INTEREST OR PLEASURE IN DOING THINGS: NOT AT ALL
2. FEELING DOWN, DEPRESSED, IRRITABLE, OR HOPELESS: NOT AT ALL
SUM OF ALL RESPONSES TO PHQ9 QUESTIONS 1 AND 2: 0

## 2019-12-03 ASSESSMENT — COGNITIVE AND FUNCTIONAL STATUS - GENERAL
DAILY ACTIVITIY SCORE: 24
SUGGESTED CMS G CODE MODIFIER MOBILITY: CH
SUGGESTED CMS G CODE MODIFIER DAILY ACTIVITY: CH
MOBILITY SCORE: 24

## 2019-12-03 NOTE — PROGRESS NOTES
"ED provider note    CHIEF COMPLAINT  Chief Complaint   Patient presents with   • Headache   • Abdominal Pain       HPI  This note documents my initial care of this patient.  This patient was reassigned to Dr. Cadet who was the doctor of record for this encounter.  Dr. Cadet accidentally edited and signed my original note.  This replaces my original note.    Carolina Leong is a 29 y.o. female who presents to the Emergency Department for evaluation of severe temporal headache onset 3 days. She rates the headache pain at a 10/10. She notes additional symptoms of abdominal pain, and mild fever, but denies rhinorrhea, sore throat, tooth pain, jaw pain, diarrhea, gastroesophageal reflux, nausea or vomiting. She denies history of severe headache or migraine similar to her symptoms today. She states she medicated with Tylenol with minimal alleviation. She denies alcohol abuse, drug abuse, or tobacco abuse. She denies taking any NSAID's.   Headache was of gradual onset over a day and a half.    REVIEW OF SYSTEMS  Pertinent positives include: Temporal headache, abdominal pain, subjective mild fever.  Pertinent negatives include: Rhinorrhea, sore throat, tooth pain, jaw pain, diarrhea, GERD, nausea, vomiting.  10+ systems reviewed and negative.      PAST MEDICAL HISTORY  Past Medical History:   Diagnosis Date   • BV (bacterial vaginosis) 5/2017       SOCIAL HISTORY  Social History     Tobacco Use   • Smoking status: Never Smoker   • Smokeless tobacco: Never Used   Substance Use Topics   • Alcohol use: No     Comment: occ before pregnancy    • Drug use: No     Social History     Substance and Sexual Activity   Drug Use No       CURRENT MEDICATIONS  None.    ALLERGIES  No Known Allergies    PHYSICAL EXAM  VITAL SIGNS: /81   Pulse (!) 103   Temp 36.3 °C (97.4 °F) (Temporal)   Resp 14   Ht 1.6 m (5' 3\")   Wt 77.8 kg (171 lb 8.3 oz)   SpO2 96%   BMI 30.38 kg/m²   Reviewed and tachycardic, " afebrile  Constitutional: Well developed, Well nourished, well-appearing.  HENT: Normocephalic, atraumatic, bilateral external ears normal, oropharynx moist, No exudates or erythema.   Eyes: PERRLA, conjunctiva pink, no scleral icterus.   Cardiovascular: Regular, tachycardic without murmur, rub, gallop.  Respiratory: No rales, rhonchi, wheeze or chest tenderness.  Gastrointestinal: Mild epigastric tenderness without rebound guarding or distention.  Skin: No erythema, no rash.   Genitourinary:  No costovertebral angle tenderness.   Neurologic: Alert & oriented x 3, cranial nerves 2-12 intac, grasp,.  No focal deficit noted.  Psychiatric: Affect normal, Judgment normal, Mood normal.     DIFFERENTIAL DIAGNOSIS:  Subarachnoid hemorrhage, migraine headache, tension headache, viral syndrome.      RADIOLOGY/PROCEDURES  CT-HEAD W/O   Final Result      No intracranial hemorrhage identified. Depending on clinical findings, MRI would be a consideration for further evaluation.   No mass effect.   No hydrocephalus.      DX-CHEST-LIMITED (1 VIEW)   Final Result      1.  No acute cardiac or pulmonary abnormalities are identified.       LABORATORY:  Results for orders placed or performed during the hospital encounter of 12/02/19   CBC WITH DIFFERENTIAL   Result Value Ref Range    WBC 12.7 (H) 4.8 - 10.8 K/uL    RBC 4.32 4.20 - 5.40 M/uL    Hemoglobin 12.4 12.0 - 16.0 g/dL    Hematocrit 37.0 37.0 - 47.0 %    MCV 85.6 81.4 - 97.8 fL    MCH 28.7 27.0 - 33.0 pg    MCHC 33.5 (L) 33.6 - 35.0 g/dL    RDW 41.0 35.9 - 50.0 fL    Platelet Count 235 164 - 446 K/uL    MPV 8.8 (L) 9.0 - 12.9 fL    Neutrophils-Polys 87.10 (H) 44.00 - 72.00 %    Lymphocytes 6.90 (L) 22.00 - 41.00 %    Monocytes 5.60 0.00 - 13.40 %    Eosinophils 0.00 0.00 - 6.90 %    Basophils 0.10 0.00 - 1.80 %    Immature Granulocytes 0.30 0.00 - 0.90 %    Nucleated RBC 0.00 /100 WBC    Neutrophils (Absolute) 11.05 (H) 2.00 - 7.15 K/uL    Lymphs (Absolute) 0.88 (L) 1.00 - 4.80  K/uL    Monos (Absolute) 0.71 0.00 - 0.85 K/uL    Eos (Absolute) 0.00 0.00 - 0.51 K/uL    Baso (Absolute) 0.01 0.00 - 0.12 K/uL    Immature Granulocytes (abs) 0.04 0.00 - 0.11 K/uL    NRBC (Absolute) 0.00 K/uL   Comp Metabolic Panel   Result Value Ref Range    Sodium 136 135 - 145 mmol/L    Potassium 3.3 (L) 3.6 - 5.5 mmol/L    Chloride 103 96 - 112 mmol/L    Co2 22 20 - 33 mmol/L    Anion Gap 11.0 0.0 - 11.9    Glucose 118 (H) 65 - 99 mg/dL    Bun 10 8 - 22 mg/dL    Creatinine 0.52 0.50 - 1.40 mg/dL    Calcium 8.4 (L) 8.5 - 10.5 mg/dL    AST(SGOT) 17 12 - 45 U/L    ALT(SGPT) 21 2 - 50 U/L    Alkaline Phosphatase 103 (H) 30 - 99 U/L    Total Bilirubin 0.8 0.1 - 1.5 mg/dL    Albumin 4.0 3.2 - 4.9 g/dL    Total Protein 7.6 6.0 - 8.2 g/dL    Globulin 3.6 (H) 1.9 - 3.5 g/dL    A-G Ratio 1.1 g/dL   LIPASE   Result Value Ref Range    Lipase 12 11 - 82 U/L   URINALYSIS,CULTURE IF INDICATED   Result Value Ref Range    Color DK Yellow     Character Clear     Specific Gravity 1.022 <1.035    Ph 6.5 5.0 - 8.0    Glucose Negative Negative mg/dL    Ketones 15 (A) Negative mg/dL    Protein 30 (A) Negative mg/dL    Bilirubin Negative Negative    Urobilinogen, Urine 2.0 Negative    Nitrite Negative Negative    Leukocyte Esterase Negative Negative    Occult Blood Large (A) Negative    Micro Urine Req Microscopic    HCG QUAL SERUM   Result Value Ref Range    Beta-Hcg Qualitative Serum Negative Negative   D-DIMER   Result Value Ref Range    D-Dimer Screen 0.61 (H) 0.00 - 0.50 ug/mL (FEU)       INTERVENTIONS: Indication IV fluids dehydration is suspected by tachycardia greater than 130  hyoscyamine-maalox plus-lidocaine viscous (GI COCKTAIL) oral susp 30 mL (30 mL Oral Given 12/2/19 1227)   HYDROmorphone pf (DILAUDID) injection 0.5 mg (0.5 mg Intravenous Given 12/2/19 1221)   prochlorperazine (COMPAZINE) injection 10 mg (10 mg Intravenous Given 12/2/19 1226)   diphenhydrAMINE (BENADRYL) injection 12.5 mg (12.5 mg Intravenous Given  12/2/19 1228)   NS infusion 1,000 mL (0 mL Intravenous Stopped 12/2/19 1633)   NS infusion 1,000 mL (0 mL Intravenous Stopped 12/2/19 1706)     Response: Improved but not resolved tachycardia on repeat assessment.    COURSE & MEDICAL DECISION MAKING  This patient presents with a severe headache of gradual onset with possible history of fever.  Head CT demonstrated no subarachnoid hemorrhage.  Given slow onset of headache lumbar puncture not felt necessary to follow-up head CT.  Patient also had epigastric chest pain or abdominal pain with an unremarkable chest x-ray and EKG.  By PERC criteria she was low risk for pulmonary embolism however the patient developed a tachycardia to 140 in the ER that did not improve significantly with IV fluids.  Given his d-dimer was ordered and pending at the end of my shift.    I asked Dr. Cadet to assume care of this patient, check the d-dimer and reassess heart rate after 2 L bolus.  Please see his note for completion of care.  He is the physician of record for this encounter.      Electronically signed by: Lalit Crowder, 12/3/2019 3:23 PM

## 2019-12-03 NOTE — PROGRESS NOTES
· 2 RN skin check complete with MINISTERIO Montalvo.  · Devices in place PIV, tele box  · Skin assessed under devices yes  · Confirmed pressure ulcers found on n/a  · New potential pressure ulcers noted on n/a. Wound consult placed and wound reported n/a.   · The following interventions in place: patient turns self, pillows in use for support and positioning, moisturizer at bedside    Assessment:  · All skin intact  · No pressure injury or breakdown noted

## 2019-12-03 NOTE — ED PROVIDER NOTES
ED Provider Note    Scribed for Marc Cadet M.D. by Bautista Felipe. 12/2/2019, 5:19 PM.    Primary care provider: Pcp Pt States None  Means of arrival: Wheel chair   History obtained from: Patient   History limited by: None     CHIEF COMPLAINT  Chief Complaint   Patient presents with   • Headache   • Abdominal Pain       HPI  Carolina Leong is a 29 y.o. female who presents to the Emergency Department for evaluation of worsening frontal headache of 3 days Patient rates her headache to be 10/10 in severity. Presents associated symptoms of shortness of breath, subjective fever, cough, medial chest pain, decreased appetite, difficulty breathing. Denies nausea, vomiting, diarrhea, neck pain, abdominal pain, dysuria. Patient states that she has taken over the counter Tylenol with no alleviation of any symptoms. Denies ill contact. Denies prior history of headaches. No use of alcohol, drugs, or cigarettes.     REVIEW OF SYSTEMS  Review of Systems   Constitutional: Fever: Subjective fever.        Positive for decreased appetite     Respiratory: Positive for cough and shortness of breath.         Positive for dyspnea     Cardiovascular: Chest pain: medial.   Gastrointestinal: Negative for abdominal pain, diarrhea, nausea and vomiting.   Genitourinary: Negative for dysuria.   Musculoskeletal: Negative for neck pain.   Neurological: Positive for headaches.   All other systems reviewed and are negative.      PAST MEDICAL HISTORY   has a past medical history of BV (bacterial vaginosis) (5/2017).    SURGICAL HISTORY  patient denies any surgical history    SOCIAL HISTORY  Social History     Tobacco Use   • Smoking status: Never Smoker   • Smokeless tobacco: Never Used   Substance Use Topics   • Alcohol use: No     Comment: occ before pregnancy    • Drug use: No      Social History     Substance and Sexual Activity   Drug Use No       FAMILY HISTORY  No family history on file.    CURRENT MEDICATIONS  Home  "Medications     Reviewed by Schuyler B Collett, R.N. (Registered Nurse) on 12/02/19 at 1006  Med List Status: <None>   Medication Last Dose Status   ibuprofen (MOTRIN) 600 MG Tab  Active   Prenatal Vit-Fe Sulfate-FA (PRENATAL VITAMIN PO)  Active                ALLERGIES  No Known Allergies    PHYSICAL EXAM  VITAL SIGNS: /77   Pulse (!) 135   Temp 36.2 °C (97.2 °F) (Temporal)   Resp 14   Ht 1.6 m (5' 3\")   Wt 76.6 kg (168 lb 14 oz)   SpO2 98%   BMI 29.91 kg/m²     Constitutional:   Mild acute distress  HENT:  Moist mucous membranes  Eyes:  No conjunctivitis or icterus  Neck:  trachea is midline, no palpable thyroid  Lymphatic:  No cervical lymphadenopathy  Cardiovascular:  Tachypnic, Regular rate and rhythm, no murmurs  Thorax & Lungs:  Normal breath sounds, no rhonchi  Abdomen: Soft, Non-tender  Skin:.  no rash  Back:  Non-tender, no CVA tenderness  Extremities:   no edema  Vascular:  symmetric radial pulse  Neurologic:  Normal gross motor    LABS  Results for orders placed or performed during the hospital encounter of 12/02/19   CBC WITH DIFFERENTIAL   Result Value Ref Range    WBC 12.7 (H) 4.8 - 10.8 K/uL    RBC 4.32 4.20 - 5.40 M/uL    Hemoglobin 12.4 12.0 - 16.0 g/dL    Hematocrit 37.0 37.0 - 47.0 %    MCV 85.6 81.4 - 97.8 fL    MCH 28.7 27.0 - 33.0 pg    MCHC 33.5 (L) 33.6 - 35.0 g/dL    RDW 41.0 35.9 - 50.0 fL    Platelet Count 235 164 - 446 K/uL    MPV 8.8 (L) 9.0 - 12.9 fL    Neutrophils-Polys 87.10 (H) 44.00 - 72.00 %    Lymphocytes 6.90 (L) 22.00 - 41.00 %    Monocytes 5.60 0.00 - 13.40 %    Eosinophils 0.00 0.00 - 6.90 %    Basophils 0.10 0.00 - 1.80 %    Immature Granulocytes 0.30 0.00 - 0.90 %    Nucleated RBC 0.00 /100 WBC    Neutrophils (Absolute) 11.05 (H) 2.00 - 7.15 K/uL    Lymphs (Absolute) 0.88 (L) 1.00 - 4.80 K/uL    Monos (Absolute) 0.71 0.00 - 0.85 K/uL    Eos (Absolute) 0.00 0.00 - 0.51 K/uL    Baso (Absolute) 0.01 0.00 - 0.12 K/uL    Immature Granulocytes (abs) 0.04 0.00 - 0.11 " K/uL    NRBC (Absolute) 0.00 K/uL   Comp Metabolic Panel   Result Value Ref Range    Sodium 136 135 - 145 mmol/L    Potassium 3.3 (L) 3.6 - 5.5 mmol/L    Chloride 103 96 - 112 mmol/L    Co2 22 20 - 33 mmol/L    Anion Gap 11.0 0.0 - 11.9    Glucose 118 (H) 65 - 99 mg/dL    Bun 10 8 - 22 mg/dL    Creatinine 0.52 0.50 - 1.40 mg/dL    Calcium 8.4 (L) 8.5 - 10.5 mg/dL    AST(SGOT) 17 12 - 45 U/L    ALT(SGPT) 21 2 - 50 U/L    Alkaline Phosphatase 103 (H) 30 - 99 U/L    Total Bilirubin 0.8 0.1 - 1.5 mg/dL    Albumin 4.0 3.2 - 4.9 g/dL    Total Protein 7.6 6.0 - 8.2 g/dL    Globulin 3.6 (H) 1.9 - 3.5 g/dL    A-G Ratio 1.1 g/dL   LIPASE   Result Value Ref Range    Lipase 12 11 - 82 U/L   URINALYSIS,CULTURE IF INDICATED   Result Value Ref Range    Color DK Yellow     Character Clear     Specific Gravity 1.022 <1.035    Ph 6.5 5.0 - 8.0    Glucose Negative Negative mg/dL    Ketones 15 (A) Negative mg/dL    Protein 30 (A) Negative mg/dL    Bilirubin Negative Negative    Urobilinogen, Urine 2.0 Negative    Nitrite Negative Negative    Leukocyte Esterase Negative Negative    Occult Blood Large (A) Negative    Micro Urine Req Microscopic    HCG QUAL SERUM   Result Value Ref Range    Beta-Hcg Qualitative Serum Negative Negative   ESTIMATED GFR   Result Value Ref Range    GFR If African American >60 >60 mL/min/1.73 m 2    GFR If Non African American >60 >60 mL/min/1.73 m 2   D-DIMER   Result Value Ref Range    D-Dimer Screen 0.61 (H) 0.00 - 0.50 ug/mL (FEU)   LACTIC ACID   Result Value Ref Range    Lactic Acid 1.1 0.5 - 2.0 mmol/L   BLOOD CULTURE   Result Value Ref Range    Significant Indicator NEG     Source BLD     Site PERIPHERAL     Culture Result       No Growth  Note: Blood cultures are incubated for 5 days and  are monitored continuously.Positive blood cultures  are called to the RN and reported as soon as  they are identified.     BLOOD CULTURE   Result Value Ref Range    Significant Indicator NEG     Source BLD     Site  PERIPHERAL     Culture Result       No Growth  Note: Blood cultures are incubated for 5 days and  are monitored continuously.Positive blood cultures  are called to the RN and reported as soon as  they are identified.     PROTHROMBIN TIME (INR)   Result Value Ref Range    PT 14.6 12.0 - 14.6 sec    INR 1.11 0.87 - 1.13   APTT   Result Value Ref Range    APTT 31.0 24.7 - 36.0 sec   URINE MICROSCOPIC (W/UA)   Result Value Ref Range    WBC 0-2 /hpf    RBC 5-10 (A) /hpf    Bacteria Negative None /hpf    Epithelial Cells Few /hpf    Mucous Threads Few /hpf    Hyaline Cast 0-2 /lpf   MAGNESIUM   Result Value Ref Range    Magnesium 1.6 1.5 - 2.5 mg/dL   CBC with Differential   Result Value Ref Range    WBC 8.9 4.8 - 10.8 K/uL    RBC 4.02 (L) 4.20 - 5.40 M/uL    Hemoglobin 11.6 (L) 12.0 - 16.0 g/dL    Hematocrit 34.8 (L) 37.0 - 47.0 %    MCV 86.6 81.4 - 97.8 fL    MCH 28.9 27.0 - 33.0 pg    MCHC 33.3 (L) 33.6 - 35.0 g/dL    RDW 41.5 35.9 - 50.0 fL    Platelet Count 204 164 - 446 K/uL    MPV 9.1 9.0 - 12.9 fL    Neutrophils-Polys 76.60 (H) 44.00 - 72.00 %    Lymphocytes 14.10 (L) 22.00 - 41.00 %    Monocytes 8.80 0.00 - 13.40 %    Eosinophils 0.10 0.00 - 6.90 %    Basophils 0.10 0.00 - 1.80 %    Immature Granulocytes 0.30 0.00 - 0.90 %    Nucleated RBC 0.00 /100 WBC    Neutrophils (Absolute) 6.82 2.00 - 7.15 K/uL    Lymphs (Absolute) 1.26 1.00 - 4.80 K/uL    Monos (Absolute) 0.78 0.00 - 0.85 K/uL    Eos (Absolute) 0.01 0.00 - 0.51 K/uL    Baso (Absolute) 0.01 0.00 - 0.12 K/uL    Immature Granulocytes (abs) 0.03 0.00 - 0.11 K/uL    NRBC (Absolute) 0.00 K/uL   Basic Metabolic Panel (BMP)   Result Value Ref Range    Sodium 137 135 - 145 mmol/L    Potassium 3.2 (L) 3.6 - 5.5 mmol/L    Chloride 106 96 - 112 mmol/L    Co2 21 20 - 33 mmol/L    Glucose 108 (H) 65 - 99 mg/dL    Bun 8 8 - 22 mg/dL    Creatinine 0.46 (L) 0.50 - 1.40 mg/dL    Calcium 7.8 (L) 8.5 - 10.5 mg/dL    Anion Gap 10.0 0.0 - 11.9   Lactic Acid Every four hours  after STAT order   Result Value Ref Range    Lactic Acid 1.1 0.5 - 2.0 mmol/L   Prothrombin time (INR)   Result Value Ref Range    PT 15.0 (H) 12.0 - 14.6 sec    INR 1.15 (H) 0.87 - 1.13   ESTIMATED GFR   Result Value Ref Range    GFR If African American >60 >60 mL/min/1.73 m 2    GFR If Non African American >60 >60 mL/min/1.73 m 2   Lactic Acid Every four hours after STAT order   Result Value Ref Range    Lactic Acid 1.0 0.5 - 2.0 mmol/L   EKG (NOW)   Result Value Ref Range    Report       [facility] Emergency Dept.    Test Date:  2019  Pt Name:    GWEN PAREKH      Department: ER  MRN:        [id number]                      Room:  Gender:     Female                       Technician: [id number]  :        1990                   Requested By:ER TRIAGE PROTOCOL  Order #:    [id number]                    Reading MD:    Measurements  Intervals                                Axis  Rate:       131                          P:          40  DC:         120                          QRS:        93  QRSD:       83                           T:          -18  QT:         295  QTc:        436    Interpretive Statements  Sinus tachycardia  Borderline right axis deviation  Borderline T abnormalities, anterior leads  Baseline wander in lead(s) II,III,aVF  Compared to ECG 07/15/2014 20:00:17  T-wave abnormality now present  Sinus bradycardia no longer present             RADIOLOGY  CT-CTA CHEST PULMONARY ARTERY W/ RECONS   Final Result      1.  No evidence of pulmonary embolus.      2.  Bibasilar atelectasis.      3.  Fatty liver.      CT-HEAD W/O   Final Result      No intracranial hemorrhage identified. Depending on clinical findings, MRI would be a consideration for further evaluation.   No mass effect.   No hydrocephalus.      DX-CHEST-LIMITED (1 VIEW)   Final Result      1.  No acute cardiac or pulmonary abnormalities are identified.      DX-LUMBAR PUNCTURE FOR DIAGNOSIS    (Results Pending)        The radiologist's interpretation of all radiological studies have been reviewed by me.    PROCEDURES  Lumbar Puncture Procedure  Patient had consent obtained and then was in the upright position and lower lumbar spine was prepped draped in anesthetized with 1% lidocaine without epinephrine.  Multiple attempts were made and I was unsuccessful at lumbar puncture    COURSE & MEDICAL DECISION MAKING  Pertinent Labs & Imaging studies reviewed. (See chart for details)    5:19 PM - Patient seen and examined at bedside. Ordered CT-CTA Chest- Pulmonary Artery w/ recons, Lactic Acid, Blood culture, INR, APTT to evaluate her symptoms. Informed patient Dr. Crowder is concerned for blood clots but that does not explain the headaches. Patient will need to be admitted to the hospital for further evaluation. Also informed patient that there is a possibility that a lumbar puncture will be performed. The risks and procedure of lumbar puncture was explained. Patient verbally consents to lumbar puncture if needed. Patient understands and agrees with plan of care. The differential diagnoses include but are not limited to: Sepsis, viral meningitis, headache, intercranial bleed, elevated d-dimer, lactic acidosis, pneumonia.    7:32 PM Lumbar Puncture performed at beside with Trauma Technician.     7:52 PM Lumbar Puncture unsuccessful     Medical Decision Making:   The patient presents with various symptoms including frontal headache cough chest pain subjective fever.  Initially seen by Dr. Bonilla.  D-dimer had been ordered and was elevated so I did order a CTA which was negative.  Patient seemed to have main complaint of frontal headache.  LP was attempted and unsuccessful.  Main concern is her persistent tachycardia.  That did not respond to fluids.  Due to all the constellation of symptoms of persistent tachycardia medicines been contacted for admission for evaluation observation        FINAL IMPRESSION  1. Tension headache    2.  Other chest pain    3. Sinus tachycardia          Bautista KRUEGER (Scribe), am scribing for, and in the presence of, Marc Cadet M.D..    Electronically signed by: Bautista Felipe (Karenibinocencio), 12/2/2019    IMarc M.D. personally performed the services described in this documentation, as scribed by Bautista Felipe in my presence, and it is both accurate and complete.    C    The note accurately reflects work and decisions made by me.  Marc Cadet  12/3/2019  8:45 PM

## 2019-12-03 NOTE — ASSESSMENT & PLAN NOTE
This is Sepsis Present on admission  SIRS criteria identified on my evaluation include: Fever, with temperature greater than 101 deg F, Tachycardia, with heart rate greater than 90 BPM and Leukocyosis, with WBC greater than 12,000  Source is Unclear ?Mastitis-no reported breast symptoms.  Differential includes influenza  Sepsis protocol initiated  Fluid resuscitation ordered per protocol  IV antibiotics as appropriate for source of sepsis  While organ dysfunction may be noted elsewhere in this problem list or in the chart, degree of organ dysfunction does not meet CMS criteria for severe sepsis  Follow-up cultures  Check influenza PCR

## 2019-12-03 NOTE — ED NOTES
Pt looks much better and does not feel febrile.  Tachycardia is improving.  Pt relates needing nothing for pain.

## 2019-12-03 NOTE — PROGRESS NOTES
Report received from ED RN. Patient A&O x 4. Reports no pain.  VS Stable. Monitor on, monitor room notified. Patient in . POC discussed, patient verbalized understanding. Belongings and bedside table within reach. Bed in low and locked positions. Fall precautions in place. Patient educated to call for assistance. Hourly rounding in place. No other needs at this time.

## 2019-12-03 NOTE — ASSESSMENT & PLAN NOTE
Given sepsis, some concern for underlying meningitis, however no associated neck pain, photophobia or other complaints.    Check viral influenza PCR  Follow-up cultures  LPS been [id number] results  Add PRN ibuprofen

## 2019-12-03 NOTE — PROGRESS NOTES
Report received. Pt care assumed. Pt sleeping at this time. Pt laying supine in bed. Pt doesn't appear to be in any pain and no signs of distress. Bed in low, locked position. Call light within reach. Treaded socks on pt.  Hourly rounding in place.

## 2019-12-03 NOTE — ED NOTES
Pt confirmed consent to be admitted upstairs via  and arrangements have been made for her 3 yo at home.

## 2019-12-03 NOTE — ED NOTES
Pt updated via Language line  services, consent acquired, discussed POC, updated on wait status, answered questions, addressed needs, ensured call light within reach. Provided emotional support for pt. Pt's family arrived to take children home

## 2019-12-03 NOTE — H&P
Hospital Medicine History & Physical Note    Date of Service  12/2/2019    Primary Care Physician  Pcp Pt States None    Consultants  None    Code Status  Full code     Chief Complaint  Fever, chest pain    History of Presenting Illness  29 y.o. female with no prior medical history, save bacterial vaginosis during pregnancy, was in her usual state of health until 3 days prior to admission.  She reports the onset of fever, aches, and a chest pain, which is in the center of her chest, made worse with swallowing or deep inspiration.  She also reports headache in the front of her head without vision changes, exacerbating or alleviating factors.  She does report associated shortness of breath, she denies abdominal pain, nausea vomiting, diarrhea or constipation.  She has no dysuria.  She reports no cough or phlegm production.  She has no other complaints.  She does state that she is currently breast-feeding but has no breast tenderness.  No vaginal discharge.    Review of Systems  Review of Systems   Constitutional: Positive for chills and fever.   HENT: Negative.    Eyes: Negative.    Respiratory: Negative.    Cardiovascular: Negative.    Gastrointestinal: Negative.    Genitourinary: Negative.    Musculoskeletal: Positive for joint pain.   Skin: Negative.    Neurological: Negative.    Endo/Heme/Allergies: Negative.    Psychiatric/Behavioral: Negative.        Past Medical History   has a past medical history of BV (bacterial vaginosis) (5/2017).    Surgical History   has no past surgical history on file.     Family History  Family history reviewed and non-pertinent     Social History   reports that she has never smoked. She has never used smokeless tobacco. She reports that she does not drink alcohol or use drugs.    Allergies  No Known Allergies    Medications  Prior to Admission Medications   Prescriptions Last Dose Informant Patient Reported? Taking?   acetaminophen (TYLENOL) 325 MG Tab 12/1/2019 at 2100  Yes Yes    Sig: Take 650 mg by mouth every 6 hours as needed (headache).      Facility-Administered Medications: None       Physical Exam  Temp:  [36.2 °C (97.2 °F)-38.4 °C (101.1 °F)] 38.4 °C (101.1 °F)  Pulse:  [129-139] 130  Resp:  [14-18] 18  BP: (100-124)/(63-77) 105/71  SpO2:  [95 %-98 %] 95 %    Physical Exam  Vitals signs and nursing note reviewed.   Constitutional:       General: She is in acute distress.   HENT:      Head: Normocephalic and atraumatic.      Nose: Nose normal.      Mouth/Throat:      Mouth: Mucous membranes are dry.   Eyes:      Extraocular Movements: Extraocular movements intact.      Conjunctiva/sclera: Conjunctivae normal.      Pupils: Pupils are equal, round, and reactive to light.   Cardiovascular:      Rate and Rhythm: Normal rate and regular rhythm.      Pulses: Normal pulses.      Heart sounds: Normal heart sounds.      Comments: Minimal chest wall tenderness to palpation   Pulmonary:      Effort: Pulmonary effort is normal.      Breath sounds: Normal breath sounds.   Abdominal:      General: Abdomen is flat.      Palpations: Abdomen is soft.      Tenderness: There is no tenderness.   Musculoskeletal: Normal range of motion.   Skin:     General: Skin is warm and dry.   Neurological:      General: No focal deficit present.      Mental Status: She is alert and oriented to person, place, and time.   Psychiatric:         Mood and Affect: Mood normal.         Behavior: Behavior normal.         Laboratory:  Recent Labs     12/02/19  1451   WBC 12.7*   RBC 4.32   HEMOGLOBIN 12.4   HEMATOCRIT 37.0   MCV 85.6   MCH 28.7   MCHC 33.5*   RDW 41.0   PLATELETCT 235   MPV 8.8*     Recent Labs     12/02/19  1451   SODIUM 136   POTASSIUM 3.3*   CHLORIDE 103   CO2 22   GLUCOSE 118*   BUN 10   CREATININE 0.52   CALCIUM 8.4*     Recent Labs     12/02/19  1451   ALTSGPT 21   ASTSGOT 17   ALKPHOSPHAT 103*   TBILIRUBIN 0.8   LIPASE 12   GLUCOSE 118*     Recent Labs     12/02/19  1451   APTT 31.0   INR 1.11     No  results for input(s): NTPROBNP in the last 72 hours.      No results for input(s): TROPONINT in the last 72 hours.    Urinalysis:    Recent Labs     12/02/19  1812   SPECGRAVITY 1.022   GLUCOSEUR Negative   KETONES 15*   NITRITE Negative   LEUKESTERAS Negative   WBCURINE 0-2   RBCURINE 5-10*   BACTERIA Negative   EPITHELCELL Few        Imaging:  CT-CTA CHEST PULMONARY ARTERY W/ RECONS   Final Result      1.  No evidence of pulmonary embolus.      2.  Bibasilar atelectasis.      3.  Fatty liver.      CT-HEAD W/O   Final Result      No intracranial hemorrhage identified. Depending on clinical findings, MRI would be a consideration for further evaluation.   No mass effect.   No hydrocephalus.      DX-CHEST-LIMITED (1 VIEW)   Final Result      1.  No acute cardiac or pulmonary abnormalities are identified.      DX-LUMBAR PUNCTURE FOR DIAGNOSIS    (Results Pending)         Assessment/Plan:  I anticipate this patient will require at least two midnights for appropriate medical management, necessitating inpatient admission.    * Sepsis (HCC)  Assessment & Plan  This is Sepsis Present on admission  SIRS criteria identified on my evaluation include: Fever, with temperature greater than 101 deg F, Tachycardia, with heart rate greater than 90 BPM and Leukocyosis, with WBC greater than 12,000  Source is Unclear ?Mastitis  Sepsis protocol initiated  Fluid resuscitation ordered per protocol  IV antibiotics as appropriate for source of sepsis  While organ dysfunction may be noted elsewhere in this problem list or in the chart, degree of organ dysfunction does not meet CMS criteria for severe sepsis          Headache  Assessment & Plan  Given sepsis, some concern for underlying meningitis, however no associated neck pain, or other complaints.  Prudent to get LP, however it was unable to be performed in the ED.  IR LP ordered.      Hypokalemia  Assessment & Plan  Mild.  Replacement.  Check magnesium level as well.     Overweight (BMI  25.0-29.9)  Assessment & Plan  Body mass index is 29.91 kg/m².      Tachycardia  Assessment & Plan  Suspect due to sepsis, otherwise unclear etiology.  Has not yet responded to fluid therapy.  Monitor. No prior reported history of the same.       VTE prophylaxis: SCD, lovenox

## 2019-12-03 NOTE — ED NOTES
Pt relates she cannot stay and has to be home to feed her baby.  Pt advised that she is sick and needs to stay and could possibly infect her child.  MD made aware.

## 2019-12-03 NOTE — PROGRESS NOTES
[facility] Medicine Daily Progress Note    Date of Service  12/3/2019    Chief Complaint  29 y.o. female admitted 12/2/2019 with Headaches, chest pain, fever.     Hospital Course      Hx of bacterial vaginosis during pregnancy admitted with HAs, fevers, aches and chest pain. Has been breast feeding- no breast tenderness.  Findings of Sepsis . Cultures drawn  Started on empiric IV antibiotics.     Interval Problem Update     Persistent tachycardia.  Fevers-yesterday decreased this morning.  Having headaches.  Reports myalgias improved.    Consultants/Specialty    None     Code Status  Full     Disposition  Anticipate DC home when stable    Review of Systems  Review of Systems   Constitutional: Positive for chills, fever and malaise/fatigue. Negative for diaphoresis.   HENT: Negative for congestion.    Eyes: Negative for photophobia, discharge and redness.   Respiratory: Positive for cough. Negative for sputum production, shortness of breath, wheezing and stridor.         Mild   Cardiovascular: Negative for chest pain, palpitations and leg swelling.   Gastrointestinal: Negative for abdominal pain, diarrhea and vomiting.   Genitourinary: Negative for flank pain and hematuria.   Musculoskeletal: Positive for myalgias. Negative for back pain, joint pain and neck pain.   Neurological: Positive for headaches. Negative for tremors, sensory change, speech change, focal weakness and weakness.   Psychiatric/Behavioral: Negative for hallucinations and substance abuse.        Physical Exam  Temp:  [36.3 °C (97.4 °F)-38.5 °C (101.3 °F)] 36.3 °C (97.4 °F)  Pulse:  [] 103  Resp:  [14-18] 14  BP: ()/(61-81) 117/81  SpO2:  [95 %-99 %] 96 %    Physical Exam  Constitutional:       General: She is not in acute distress.     Appearance: She is obese. She is not diaphoretic.   HENT:      Head: Normocephalic and atraumatic.      Right Ear: External ear normal.      Left Ear: External ear normal.      Nose: Nose normal.   Eyes:       General: No scleral icterus.     Extraocular Movements: Extraocular movements intact.      Conjunctiva/sclera: Conjunctivae normal.   Neck:      Musculoskeletal: Normal range of motion and neck supple.   Cardiovascular:      Heart sounds: No murmur.      Comments: Tacky, regular, no murmurs  Pulmonary:      Breath sounds: No wheezing, rhonchi or rales.   Abdominal:      General: Abdomen is flat. Bowel sounds are normal. There is no distension.      Palpations: Abdomen is soft.   Musculoskeletal:         General: No swelling or tenderness.   Skin:     General: Skin is warm and dry.      Coloration: Skin is not jaundiced.   Neurological:      General: No focal deficit present.      Mental Status: She is oriented to person, place, and time.         Fluids    Intake/Output Summary (Last 24 hours) at 12/3/2019 1551  Last data filed at 12/3/2019 0000  Gross per 24 hour   Intake 2100 ml   Output --   Net 2100 ml       Laboratory  Recent Labs     12/02/19  1451 12/03/19  0415   WBC 12.7* 8.9   RBC 4.32 4.02*   HEMOGLOBIN 12.4 11.6*   HEMATOCRIT 37.0 34.8*   MCV 85.6 86.6   MCH 28.7 28.9   MCHC 33.5* 33.3*   RDW 41.0 41.5   PLATELETCT 235 204   MPV 8.8* 9.1     Recent Labs     12/02/19  1451 12/03/19  0415   SODIUM 136 137   POTASSIUM 3.3* 3.2*   CHLORIDE 103 106   CO2 22 21   GLUCOSE 118* 108*   BUN 10 8   CREATININE 0.52 0.46*   CALCIUM 8.4* 7.8*     Recent Labs     12/02/19  1451 12/02/19  1812   APTT 31.0  --    INR 1.11 1.15*               Imaging  CT-CTA CHEST PULMONARY ARTERY W/ RECONS   Final Result      1.  No evidence of pulmonary embolus.      2.  Bibasilar atelectasis.      3.  Fatty liver.      CT-HEAD W/O   Final Result      No intracranial hemorrhage identified. Depending on clinical findings, MRI would be a consideration for further evaluation.   No mass effect.   No hydrocephalus.      DX-CHEST-LIMITED (1 VIEW)   Final Result      1.  No acute cardiac or pulmonary abnormalities are identified.       DX-LUMBAR PUNCTURE FOR DIAGNOSIS    (Results Pending)        Assessment/Plan  * Sepsis (HCC)  Assessment & Plan  This is Sepsis Present on admission  SIRS criteria identified on my evaluation include: Fever, with temperature greater than 101 deg F, Tachycardia, with heart rate greater than 90 BPM and Leukocyosis, with WBC greater than 12,000  Source is Unclear ?Mastitis-no reported breast symptoms.  Differential includes influenza  Sepsis protocol initiated  Fluid resuscitation ordered per protocol  IV antibiotics as appropriate for source of sepsis  While organ dysfunction may be noted elsewhere in this problem list or in the chart, degree of organ dysfunction does not meet CMS criteria for severe sepsis  Follow-up cultures  Check influenza PCR          Headache  Assessment & Plan  Given sepsis, some concern for underlying meningitis, however no associated neck pain, photophobia or other complaints.    Check viral influenza PCR  Follow-up cultures  LPS been [id number] results  Add PRN ibuprofen    Hypokalemia  Assessment & Plan  Increase oral KCl  IV magnesium replacement  Follow-up electrolytes      Overweight (BMI 25.0-29.9)  Assessment & Plan  Body mass index is 29.91 kg/m².  Will need continued weight loss counseling    Tachycardia  Assessment & Plan  Likely due to sepsis  IV fluid support  Monitor telemetry  As needed Tylenol for fever       VTE prophylaxis: SCDs  Findings, plan of care discussed at length with patient using     Discussed with multidisciplinary team plan of care.

## 2019-12-03 NOTE — ED NOTES
Discussed baby feeding situation and wanting to leave with pt, pt consents to stay via .  Understanding this seemed slightly difficult.   Pt requests food.  Pt given box per regular diet order.  Rocephin started.

## 2019-12-04 ENCOUNTER — PATIENT OUTREACH (OUTPATIENT)
Dept: HEALTH INFORMATION MANAGEMENT | Facility: OTHER | Age: 29
End: 2019-12-04

## 2019-12-04 ENCOUNTER — APPOINTMENT (OUTPATIENT)
Dept: RADIOLOGY | Facility: MEDICAL CENTER | Age: 29
DRG: 872 | End: 2019-12-04
Attending: EMERGENCY MEDICINE
Payer: MEDICAID

## 2019-12-04 VITALS
SYSTOLIC BLOOD PRESSURE: 102 MMHG | BODY MASS INDEX: 30.31 KG/M2 | OXYGEN SATURATION: 98 % | DIASTOLIC BLOOD PRESSURE: 57 MMHG | TEMPERATURE: 97.4 F | HEIGHT: 63 IN | RESPIRATION RATE: 14 BRPM | HEART RATE: 65 BPM | WEIGHT: 171.08 LBS

## 2019-12-04 LAB
ANION GAP SERPL CALC-SCNC: 7 MMOL/L (ref 0–11.9)
BUN SERPL-MCNC: 9 MG/DL (ref 8–22)
BURR CELLS/RBC NFR CSF MANUAL: 0 %
C TRACH DNA SPEC QL NAA+PROBE: NEGATIVE
CALCIUM SERPL-MCNC: 8.8 MG/DL (ref 8.5–10.5)
CHLORIDE SERPL-SCNC: 108 MMOL/L (ref 96–112)
CLARITY CSF: CLEAR
CO2 SERPL-SCNC: 22 MMOL/L (ref 20–33)
COLOR CSF: COLORLESS
COLOR SPUN CSF: COLORLESS
CREAT SERPL-MCNC: 0.46 MG/DL (ref 0.5–1.4)
GLUCOSE CSF-MCNC: 67 MG/DL (ref 40–80)
GLUCOSE SERPL-MCNC: 110 MG/DL (ref 65–99)
GRAM STN SPEC: NORMAL
LYMPHOCYTES NFR CSF: 86 %
MONONUC CELLS NFR CSF: 14 %
N GONORRHOEA DNA SPEC QL NAA+PROBE: NEGATIVE
POTASSIUM SERPL-SCNC: 4 MMOL/L (ref 3.6–5.5)
PROT CSF-MCNC: 19 MG/DL (ref 15–45)
RBC # CSF: 2 CELLS/UL
SIGNIFICANT IND 70042: NORMAL
SITE SITE: NORMAL
SODIUM SERPL-SCNC: 137 MMOL/L (ref 135–145)
SOURCE SOURCE: NORMAL
SPECIMEN SOURCE: NORMAL
SPECIMEN VOL CSF: 6 ML
TUBE # CSF: 3
TUBE # CSF: 3
WBC # CSF: 1 CELLS/UL (ref 0–10)

## 2019-12-04 PROCEDURE — 87070 CULTURE OTHR SPECIMN AEROBIC: CPT

## 2019-12-04 PROCEDURE — 99239 HOSP IP/OBS DSCHRG MGMT >30: CPT | Performed by: HOSPITALIST

## 2019-12-04 PROCEDURE — 62270 DX LMBR SPI PNXR: CPT

## 2019-12-04 PROCEDURE — 009U3ZX DRAINAGE OF SPINAL CANAL, PERCUTANEOUS APPROACH, DIAGNOSTIC: ICD-10-PCS | Performed by: RADIOLOGY

## 2019-12-04 PROCEDURE — 700105 HCHG RX REV CODE 258: Performed by: HOSPITALIST

## 2019-12-04 PROCEDURE — 700102 HCHG RX REV CODE 250 W/ 637 OVERRIDE(OP): Performed by: HOSPITALIST

## 2019-12-04 PROCEDURE — 84157 ASSAY OF PROTEIN OTHER: CPT

## 2019-12-04 PROCEDURE — 80048 BASIC METABOLIC PNL TOTAL CA: CPT

## 2019-12-04 PROCEDURE — 89051 BODY FLUID CELL COUNT: CPT

## 2019-12-04 PROCEDURE — A9270 NON-COVERED ITEM OR SERVICE: HCPCS | Performed by: HOSPITALIST

## 2019-12-04 PROCEDURE — 87205 SMEAR GRAM STAIN: CPT

## 2019-12-04 PROCEDURE — 36415 COLL VENOUS BLD VENIPUNCTURE: CPT

## 2019-12-04 PROCEDURE — 82945 GLUCOSE OTHER FLUID: CPT

## 2019-12-04 RX ORDER — IBUPROFEN 600 MG/1
600 TABLET ORAL EVERY 6 HOURS PRN
Qty: 30 TAB | Refills: 0 | Status: SHIPPED | OUTPATIENT
Start: 2019-12-04 | End: 2024-01-29

## 2019-12-04 RX ADMIN — SODIUM CHLORIDE, POTASSIUM CHLORIDE, SODIUM LACTATE AND CALCIUM CHLORIDE: 600; 310; 30; 20 INJECTION, SOLUTION INTRAVENOUS at 04:50

## 2019-12-04 RX ADMIN — DIPHENHYDRAMINE HCL 25 MG: 25 TABLET ORAL at 00:04

## 2019-12-04 RX ADMIN — POTASSIUM CHLORIDE 20 MEQ: 1500 TABLET, EXTENDED RELEASE ORAL at 04:50

## 2019-12-04 NOTE — OR SURGEON
HISTORY/REASON FOR EXAM:  Headaches and fever. Evaluate for meningitis.         TECHNIQUE/EXAM DESCRIPTION AND NUMBER OF VIEWS:  Fluoroscopic-guided lumbar puncture.    1 fluoroscopic images obtained.    Fluoroscopy time: 0.3 minutes    PROCEDURE:     Informed consent was obtained. A timeout was taken. With the patient in prone position, the lumbar region was prepped and draped in a sterile manner. Following local anesthesia with 1% lidocaine, a 22-gauge spinal needle was advanced into the subarachnoid space at the L 3-L4 level.  Approximately 6 cc of clear CSF was collected and the specimens sent to the lab for the studies requested. The patient tolerated the procedure well with no evidence of complication.    IMPRESSION:  Fluoroscopic-guided lumbar puncture as described above.

## 2019-12-04 NOTE — PROGRESS NOTES
Report received. Patient A&O x 4. Reports pain 8/10 in head.  VS Stable. POC discussed, patient verbalized understanding. Belongings and bedside table within reach. Bed in low and locked positions. Fall precautions in place. Patient educated to call for assistance. Hourly rounding in place. No other needs at this time.

## 2019-12-04 NOTE — CARE PLAN
Problem: Safety  Goal: Will remain free from falls  Outcome: PROGRESSING AS EXPECTED  Note:   Patient will remain free from falls. Patient educated on importance of calling for assistance when assistance needed. Patient educated on tread socks, belongings within reach, and use of call light. Bed in low and locked position. Fall precautions in place.       Problem: Knowledge Deficit  Goal: Knowledge of disease process/condition, treatment plan, diagnostic tests, and medications will improve  Outcome: PROGRESSING AS EXPECTED  Note:   Patient updated on POC, medication education provided. Patient encouraged to voice all questions and concerns.

## 2019-12-04 NOTE — DISCHARGE INSTRUCTIONS
Discharge Instructions    Discharged to home by car with relative. Discharged via wheelchair, hospital escort: Yes.  Special equipment needed: Not Applicable    Be sure to schedule a follow-up appointment with your primary care doctor or any specialists as instructed.     Discharge Plan:   Diet Plan: Discussed  Activity Level: Discussed  Confirmed Follow up Appointment: Appointment Scheduled  Confirmed Symptoms Management: Discussed  Medication Reconciliation Updated: Yes  Influenza Vaccine Indication: Indicated: 9 to 64 years of age  Influenza Vaccine Given - only chart on this line when given: Influenza Vaccine Given (See MAR)    I understand that a diet low in cholesterol, fat, and sodium is recommended for good health. Unless I have been given specific instructions below for another diet, I accept this instruction as my diet prescription.   Other diet: heart healthy    Special Instructions: Sepsis, Adult  Sepsis is a serious infection of your blood or tissues that affects your whole body. The infection that causes sepsis may be bacterial, viral, fungal, or parasitic. Sepsis may be life threatening. Sepsis can cause your blood pressure to drop. This may result in shock. Shock causes your central nervous system and your organs to stop working correctly.  What increases the risk?  Sepsis can happen in anyone, but it is more likely to happen in people who have weakened immune systems.  What are the signs or symptoms?  Symptoms of sepsis can include:  · Fever or low body temperature (hypothermia).  · Rapid breathing (hyperventilation).  · Chills.  · Rapid heartbeat (tachycardia).  · Confusion or light-headedness.  · Trouble breathing.  · Urinating much less than usual.  · Cool, clammy skin or red, flushed skin.  · Other problems with the heart, kidneys, or brain.  How is this diagnosed?  Your health care provider will likely do tests to look for an infection, to see if the infection has spread to your blood, and to  see how serious your condition is. Tests can include:  · Blood tests, including cultures of your blood.  · Cultures of other fluids from your body, such as:  ¨ Urine.  ¨ Pus from wounds.  ¨ Mucus coughed up from your lungs.  · Urine tests other than cultures.  · X-ray exams or other imaging tests.  How is this treated?  Treatment will begin with elimination of the source of infection. If your sepsis is likely caused by a bacterial or fungal infection, you will be given antibiotic or antifungal medicines.  You may also receive:  · Oxygen.  · Fluids through an IV tube.  · Medicines to increase your blood pressure.  · A machine to clean your blood (dialysis) if your kidneys fail.  · A machine to help you breathe if your lungs fail.  Get help right away if:  You get an infection or develop any of the signs and symptoms of sepsis after surgery or a hospitalization.  This information is not intended to replace advice given to you by your health care provider. Make sure you discuss any questions you have with your health care provider.  Document Released: 09/15/2004 Document Revised: 05/25/2017 Document Reviewed: 08/25/2014  [employer] Interactive Patient Education © 2017 [employer]    · Is patient discharged on Warfarin / Coumadin?   No     Depression / Suicide Risk    As you are discharged from this [facility] facility, it is important to learn how to keep safe from harming yourself.    Recognize the warning signs:  · Abrupt changes in personality, positive or negative- including increase in energy   · Giving away possessions  · Change in eating patterns- significant weight changes-  positive or negative  · Change in sleeping patterns- unable to sleep or sleeping all the time   · Unwillingness or inability to communicate  · Depression  · Unusual sadness, discouragement and loneliness  · Talk of wanting to die  · Neglect of personal appearance   · Rebelliousness- reckless behavior  · Withdrawal from people/activities  they love  · Confusion- inability to concentrate     If you or a loved one observes any of these behaviors or has concerns about self-harm, here's what you can do:  · Talk about it- your feelings and reasons for harming yourself  · Remove any means that you might use to hurt yourself (examples: pills, rope, extension cords, firearm)  · Get professional help from the community (Mental Health, Substance Abuse, psychological counseling)  · Do not be alone:Call your Safe Contact- someone whom you trust who will be there for you.  · Call your local CRISIS HOTLINE 830-6629 or 700-973-7066  · Call your local Children's Mobile Crisis Response Team [place] (256) 418-8226 or www.Sh[employer]force  · Call the toll free National Suicide Prevention Hotlines   · National Suicide Prevention Lifeline 676-705-RFGA (0765)  · [place] [facility] 800-SUICIDE (223-5636)    Cefaleas en general, sin causa  (Headache, General, Without Cause)     Kaleigh ibuprofen y tylenol inmediatemente cuando empieza el dolor de john.  Regresa para dolor leida que empieza de repente, para dolor y fiebre or para dolor y debilidad.  Regrasa para dificultad de respirar, hinchazon de pie, o si parece enferma.    Posiblamiente usted tiene eros presion de danuta.  Vaya a un medico propio para chequear la presion y el nivel de cholesterol.  BP Readings from Last 3 Encounters:   12/02/19 124/77   10/26/17 111/82   09/24/17 106/76         Puede ser que en el día de hoy no se haya determinado la causa específica de arroyo dolor de john. Hay muchas causas y tipos de cefalea.  Las más frecuentes son:  Ø Cefalea tensional  Ø Migraña  Ø Infecciones (por ejemplo: infecciones dentarias y en los senos)  Ø Problemas en el hueso o en la articulación del dyana o la mandíbula.  Ø Depresión  Ø Problemas visuales   Estos tipos de cefalea no ponen en peligro arroyo titus.      Bianka trastorno puede diagnosticarse a partir de la historia clínica y un examen físico. En  algunos casos se realizan estudios de laboratorio y por imágenes (chio radiografías o tomografías computadas) para diagnosticar problemas más graves. En algunos casos se indicará neela punción lumbar. En muchos casos los análisis y las pruebas pueden ser normales en la primera visita y la causa de magnolia ailyn de john es un problema grave. Por eso es muy importante que concurra a los controles con arroyo médico o la clínica de arroyo localidad para realizar nuevas evaluaciones.     INSTRUCCIONES PARA EL CUIDADO DOMICILIARIO  Ø Concurra a los controles con arroyo médico o especialista.   Ø Sólo tome medicamentos de venta zoltan o prescriptos para calmar el dolor, las molestias, o bajar la fiebre según las indicaciones de arroyo médico.   Ø Las técnicas de bioretroalimentación, los masajes y otras técnicas de relajación pueden ser de utilidad.  Ø Pueden utilizarse bolsas de hielo o calor aplicadas a la john o al dyana. Repita frederic o cuatro veces por día, o cuando lo necesite.  Ø Comuníquese con arroyo médico si tiene preguntas o preocupaciones.  Ø Si fuma, debe abandonar el hábito.     AVERIGUE LOS RESULTADOS DE LAS PRUEBAS  Ø Si le pina tomado radiografías, un radiólogo leerá los resultados.  Ø Será contactado por el servicio de urgencias o por arroyo médico si los resultados de alguna prueba indican que debe realizar algún cambio en el plan de tratamiento.  Ø Radha arroyo visita puede ser que no cuente con todos los resultados de los análisis. En rose mary sayra, tenga otra entrevista con arroyo médico para conocerlos. No piense que el resultado es  normal si no tiene noticias de arroyo médico o de la institución médica. Es importante el seguimiento de todos los resultados de los análisis.      SOLICITE ATENCIÓN MÉDICA SI:  Ø Presenta algún problema con el medicamento que le pina prescrito.  Ø No responde o no obtiene alivio de los medicamentos.  Ø El dolor de john que sentía habitualmente es diferente.  Ø Presenta náuseas o vómitos.     SOLICITE  ATENCIÓN MÉDICA DE INMEDIATO SI:   Ø El dolor de john es cada vez más intenso.  Ø La temperatura oral se eleva sin motivo por encima de 102°F (38,9°C) o según le indique el profesional que lo asiste.  Ø Presenta rigidez en el dyana.  Ø Sufre pérdida  de la visión.  Ø Siente debilidad muscular.   Ø Sufre pérdida  del control muscular.  Ø Desarrolla síntomas graves diferentes de los primeros.  Ø Comienza a perder el equilibrio o tiene problemas para caminar.  Ø Se desmaya o pierde el conocimiento.     ESTÉ SEGURO QUE:        Ø Comprende las instrucciones para el eros médica.   Ø Controlará arroyo enfermedad.  Ø Solicitará atención médica de inmediato según las indicaciones.     Document Released: 09/27/2006  Document Re-Released: 03/16/2010  [employer]® Patient Information ©2010 [employer].    Return to ER if increased pain, fevers, Nausea, vomiting.

## 2019-12-04 NOTE — CARE PLAN
Problem: Safety  Goal: Will remain free from injury  Outcome: PROGRESSING AS EXPECTED  Note:   Pt educated on fall precautions. Bed in low, locked position. Call light within reach. Educated pt on calling to ambulate. Treaded socks on pt. Hourly rounding in place.      Problem: Infection  Goal: Will remain free from infection  Outcome: PROGRESSING AS EXPECTED  Note:   Medicating per MAR. Pt assessment performed. Monitoring vital signs. Monitoring for s/s of worsening infection.      Problem: Communication  Goal: The ability to communicate needs accurately and effectively will improve  Outcome: PROGRESSING SLOWER THAN EXPECTED  Note:    used for assessment, educating pt, update pt on POC. Answer questions as needed.

## 2019-12-04 NOTE — DISCHARGE PLANNING
Care Transition Team Assessment  LSW met with pt at bedside to complete assessment and discuss discharge planning/barriers.  used. Pt's goal is to return home and reported her brother would provide transportation. Pt utilizes [facility] pharmacy on [address].     Pt is 29 year old female who reported she lives with the father of her 4 children. She reported she has 4 minor sons her oldest being 7 years old. Pt is undocumented and reported she does not have an income. She reported the family gets about $400/month in SNAP benefits and the father of the children is employed making about $900/biweekly. She denied any financial barriers to meeting their basic needs. She reported she does not drive but will take a cab, bus or walk to places. She was previously fully independent with all ADL/iADL needs and denied the use of any DME. Pt denied any substance use or behavioral health issues. Pt reported her family is a good support system.     LSW provided pt with information on applying for Emergency Medicaid for there current hospitalization and [facility] to [facility] program. Pt was also provided with information from [facility]  on the [facility] and [facility].     Information Source  Orientation : Oriented x 4  Information Given By: Patient  Informant's Name: Carolina  Who is responsible for making decisions for patient? : Patient    Readmission Evaluation  Is this a readmission?: No    Elopement Risk  Legal Hold: No  Ambulatory or Self Mobile in Wheelchair: Yes  Disoriented: No  Psychiatric Symptoms: None  History of Wandering: No  Elopement this Admit: No  Vocalizing Wanting to Leave: No  Displays Behaviors, Body Language Wanting to Leave: No-Not at Risk for Elopement  Elopement Risk: Not at Risk for Elopement    Interdisciplinary Discharge Planning  Primary Care Physician: None  Lives with - Patient's Self Care Capacity: Child Less than 18 Years of Age, Other (Comments)(Father of  children)  Patient or legal guardian wants to designate a caregiver (see row info): No  Support Systems: Family Member(s)  Housing / Facility: [address]  Prior Services: Home-Independent  Patient Expects to be Discharged to:: Home  Durable Medical Equipment: Not Applicable    Discharge Preparedness  What is your plan after discharge?: Home with help  What are your discharge supports?: Partner, Sibling  Prior Functional Level: Ambulatory, Independent with Activities of Daily Living, Independent with Medication Management  Difficulity with ADLs: None  Difficulity with IADLs: None    Functional Assesment  Prior Functional Level: Ambulatory, Independent with Activities of Daily Living, Independent with Medication Management    Finances  Financial Barriers to Discharge: No  Prescription Coverage: No  Prescription Coverage Comments: Pt does not have insurance     Advance Directive  Advance Directive?: None    Domestic Abuse  Have you ever been the victim of abuse or violence?: No  Physical Abuse or Sexual Abuse: No  Verbal Abuse or Emotional Abuse: No  Possible Abuse Reported to:: Not Applicable    Psychological Assessment  History of Substance Abuse: None  History of Psychiatric Problems: No    Discharge Risks or Barriers  Discharge risks or barriers?: No PCP, Uninsured / underinsured  Patient risk factors: No PCP, Uninsured or underinsured    Anticipated Discharge Information  Anticipated discharge disposition: Home  Discharge Address: [address] [place] [address] [place] [address]  Discharge Contact Phone Number: [phone]

## 2019-12-05 LAB — EKG IMPRESSION: NORMAL

## 2019-12-05 NOTE — DISCHARGE SUMMARY
Hospital Medicine Discharge Note     Admit Date:  12/2/2019       Discharge Date:   12/4/2019    Attending Physician:  Wicho Odom M.D.      Diagnoses (includes active and resolved):     Principal Problem:    Sepsis (HCC) POA: Unknown  Active Problems:    Tachycardia POA: Unknown    Overweight (BMI 25.0-29.9) POA: Unknown    Hypokalemia POA: Unknown    Headache POA: Unknown    Viral syndrome   Resolved Problems:    Sepsis     Tachycardia     Hypokalemia    Headache.     Hospital Summary (Brief Narrative):           30 yo female Hx of bacterial vaginosis during pregnancy admitted with HAs, fevers, aches and chest pain. She had been breast feeding but had  no breast tenderness.  In ER she had findings of Sepsis with elevated WBC 12K, fever,  tachycardia . Cultures drawn  Started on empiric IV antibiotics.  Patient's blood cultures, urine were negative.  Influenza negative.  LP was initially ordered and revealed no signs for meningitis on CSF analysis.  Following admission her headache myalgias fever, tachycardia and elevated white count resolved.  Suspect she had a viral syndrome which cleared.  She had no shortness of breath, hypoxia.  No  localized swelling or redness on exam. On day of discharge I examined patient, discussed findings, plan of care and follow-up.  She was discharged home in stable condition.     Consultants:        None     Imaging/ Testing:      DX-LUMBAR PUNCTURE FOR DIAGNOSIS   Final Result      1.  Fluoroscopic-guided lumbar puncture as described above.      CT-CTA CHEST PULMONARY ARTERY W/ RECONS   Final Result      1.  No evidence of pulmonary embolus.      2.  Bibasilar atelectasis.      3.  Fatty liver.      CT-HEAD W/O   Final Result      No intracranial hemorrhage identified. Depending on clinical findings, MRI would be a consideration for further evaluation.   No mass effect.   No hydrocephalus.      DX-CHEST-LIMITED (1 VIEW)   Final Result      1.  No acute cardiac or pulmonary  abnormalities are identified.            Procedures:          Lumbar puncture 12-4-19    Discharge Medications:             Medication List      START taking these medications      Instructions   ibuprofen 600 MG Tabs  Commonly known as:  MOTRIN   Take 1 Tab by mouth every 6 hours as needed for Mild Pain or Moderate Pain.  Dose:  600 mg        CONTINUE taking these medications      Instructions   acetaminophen 325 MG Tabs  Commonly known as:  TYLENOL   Take 650 mg by mouth every 6 hours as needed (headache).  Dose:  650 mg               Diet:       DIET ORDERS (From admission to next 24h)    None            Activity:   As tolerated.      Code status:   Full code        Follow up appointment details :      .  [address] [id number] [phone]    Por favor llame o entre para fijar neela saurav con un nuevo medico de cabezera. Richelle.                    Time spent on discharge day patient visit: 40  minutes    #################################################

## 2019-12-05 NOTE — PROGRESS NOTES
Pt discharged to home with brother. Discharge teaching performed. Questions answered as needed. Discharge paperwork, prescriptions, and belongings home with pt.

## 2019-12-07 LAB
BACTERIA BLD CULT: NORMAL
BACTERIA BLD CULT: NORMAL
BACTERIA CSF CULT: NORMAL
GRAM STN SPEC: NORMAL
SIGNIFICANT IND 70042: NORMAL
SITE SITE: NORMAL
SOURCE SOURCE: NORMAL

## 2021-06-17 NOTE — TELEPHONE ENCOUNTER
AN financial screening has been completed. Patient has been instructed to call appointment line on or after 7/1/21. Notes Recorded by Riky Tamayo M.D. on 6/27/2017 at 2:16 PM  Consultation with [facility]  Due to abnormal fetal calvarium.    Pt notified of need to get 3rd level scan, was notified that this is not a Dx.  [facility] info was provided to contact them.

## 2021-08-17 NOTE — PROGRESS NOTES
[facility] LABOR AND DELIVERY PROGRESS NOTE    PATIENT ID:  NAME:  Carolina Leong  MRN:               [id number]  YOB: 1990     27 y.o. female  at 41w0d.    Subjective:  Pt is still comfortable with epidural    Objective:    Vitals:    17 2328 17 2347 17 0006 17 0026   BP: 100/58 109/67 107/66 104/63   Pulse: 68 62 61 63   Resp:       Temp:    36.3 °C (97.4 °F)   TempSrc:       SpO2:       Weight:       Height:           Cervix:  6cm/100%/-1  [place]: Uterine Contractions Q 2  minutes. RT 0 mm Hg U/C peaks are 30-50 mm Hg   FHRM: Baseline 135-140, Avg variability, Accels +, occasional mild variable decels  Pitocin: 3 milliunites  Pain control:  epidural  Bleeding has ceased for now no more show at this time     Assessment: 27 y.o. female    at 41w0d.    Plan:   1. Labor: active   2. IUP: Category  1  tracing, vtx presentation.  GBS unknown  3. Anticipate    Name band;

## 2024-01-29 ENCOUNTER — HOSPITAL ENCOUNTER (INPATIENT)
Facility: MEDICAL CENTER | Age: 34
LOS: 2 days | DRG: 832 | End: 2024-01-31
Attending: EMERGENCY MEDICINE | Admitting: OBSTETRICS & GYNECOLOGY
Payer: MEDICAID

## 2024-01-29 ENCOUNTER — APPOINTMENT (OUTPATIENT)
Dept: RADIOLOGY | Facility: MEDICAL CENTER | Age: 34
DRG: 832 | End: 2024-01-29
Attending: OBSTETRICS & GYNECOLOGY
Payer: MEDICAID

## 2024-01-29 ENCOUNTER — APPOINTMENT (OUTPATIENT)
Dept: RADIOLOGY | Facility: MEDICAL CENTER | Age: 34
DRG: 832 | End: 2024-01-29
Attending: EMERGENCY MEDICINE
Payer: MEDICAID

## 2024-01-29 ENCOUNTER — APPOINTMENT (OUTPATIENT)
Dept: RADIOLOGY | Facility: MEDICAL CENTER | Age: 34
DRG: 832 | End: 2024-01-29
Payer: MEDICAID

## 2024-01-29 DIAGNOSIS — R00.0 TACHYCARDIA: ICD-10-CM

## 2024-01-29 DIAGNOSIS — N12 PYELONEPHRITIS: ICD-10-CM

## 2024-01-29 DIAGNOSIS — O09.30 LATE PRENATAL CARE: ICD-10-CM

## 2024-01-29 DIAGNOSIS — R30.0 DYSURIA: ICD-10-CM

## 2024-01-29 PROBLEM — N39.0 UTI (URINARY TRACT INFECTION): Status: ACTIVE | Noted: 2024-01-29

## 2024-01-29 LAB
ABO GROUP BLD: NORMAL
ALBUMIN SERPL BCP-MCNC: 3.6 G/DL (ref 3.2–4.9)
ALBUMIN/GLOB SERPL: 0.9 G/DL
ALP SERPL-CCNC: 104 U/L (ref 30–99)
ALT SERPL-CCNC: 10 U/L (ref 2–50)
ANION GAP SERPL CALC-SCNC: 16 MMOL/L (ref 7–16)
APPEARANCE UR: ABNORMAL
AST SERPL-CCNC: 25 U/L (ref 12–45)
B-HCG SERPL-ACNC: ABNORMAL MIU/ML (ref 0–5)
BACTERIA #/AREA URNS HPF: ABNORMAL /HPF
BASOPHILS # BLD AUTO: 0.1 % (ref 0–1.8)
BASOPHILS # BLD AUTO: 0.2 % (ref 0–1.8)
BASOPHILS # BLD: 0.01 K/UL (ref 0–0.12)
BASOPHILS # BLD: 0.01 K/UL (ref 0–0.12)
BILIRUB SERPL-MCNC: 1 MG/DL (ref 0.1–1.5)
BILIRUB UR QL STRIP.AUTO: NEGATIVE
BLD GP AB SCN SERPL QL: NORMAL
BUN SERPL-MCNC: 6 MG/DL (ref 8–22)
CALCIUM ALBUM COR SERPL-MCNC: 9.2 MG/DL (ref 8.5–10.5)
CALCIUM SERPL-MCNC: 8.9 MG/DL (ref 8.5–10.5)
CHLORIDE SERPL-SCNC: 102 MMOL/L (ref 96–112)
CO2 SERPL-SCNC: 16 MMOL/L (ref 20–33)
COLOR UR: YELLOW
CREAT SERPL-MCNC: 0.32 MG/DL (ref 0.5–1.4)
D DIMER PPP IA.FEU-MCNC: 0.42 UG/ML (FEU) (ref 0–0.5)
EKG IMPRESSION: NORMAL
EKG IMPRESSION: NORMAL
EOSINOPHIL # BLD AUTO: 0 K/UL (ref 0–0.51)
EOSINOPHIL # BLD AUTO: 0.01 K/UL (ref 0–0.51)
EOSINOPHIL NFR BLD: 0 % (ref 0–6.9)
EOSINOPHIL NFR BLD: 0.1 % (ref 0–6.9)
EPI CELLS #/AREA URNS HPF: ABNORMAL /HPF
ERYTHROCYTE [DISTWIDTH] IN BLOOD BY AUTOMATED COUNT: 39.7 FL (ref 35.9–50)
ERYTHROCYTE [DISTWIDTH] IN BLOOD BY AUTOMATED COUNT: 40.7 FL (ref 35.9–50)
FLUAV RNA SPEC QL NAA+PROBE: NEGATIVE
FLUBV RNA SPEC QL NAA+PROBE: NEGATIVE
GFR SERPLBLD CREATININE-BSD FMLA CKD-EPI: 141 ML/MIN/1.73 M 2
GLOBULIN SER CALC-MCNC: 3.8 G/DL (ref 1.9–3.5)
GLUCOSE SERPL-MCNC: 101 MG/DL (ref 65–99)
GLUCOSE UR STRIP.AUTO-MCNC: NEGATIVE MG/DL
HBV SURFACE AG SER QL: ABNORMAL
HCG SERPL QL: POSITIVE
HCT VFR BLD AUTO: 33.8 % (ref 37–47)
HCT VFR BLD AUTO: 37.7 % (ref 37–47)
HGB BLD-MCNC: 11.4 G/DL (ref 12–16)
HGB BLD-MCNC: 12.9 G/DL (ref 12–16)
HIV 1+2 AB+HIV1 P24 AG SERPL QL IA: NORMAL
HYALINE CASTS #/AREA URNS LPF: ABNORMAL /LPF
IMM GRANULOCYTES # BLD AUTO: 0.02 K/UL (ref 0–0.11)
IMM GRANULOCYTES # BLD AUTO: 0.03 K/UL (ref 0–0.11)
IMM GRANULOCYTES NFR BLD AUTO: 0.3 % (ref 0–0.9)
IMM GRANULOCYTES NFR BLD AUTO: 0.3 % (ref 0–0.9)
KETONES UR STRIP.AUTO-MCNC: NEGATIVE MG/DL
LACTATE SERPL-SCNC: 1 MMOL/L (ref 0.5–2)
LACTATE SERPL-SCNC: 1 MMOL/L (ref 0.5–2)
LACTATE SERPL-SCNC: 1.3 MMOL/L (ref 0.5–2)
LEUKOCYTE ESTERASE UR QL STRIP.AUTO: ABNORMAL
LYMPHOCYTES # BLD AUTO: 0.25 K/UL (ref 1–4.8)
LYMPHOCYTES # BLD AUTO: 0.43 K/UL (ref 1–4.8)
LYMPHOCYTES NFR BLD: 2.8 % (ref 22–41)
LYMPHOCYTES NFR BLD: 6.9 % (ref 22–41)
MAGNESIUM SERPL-MCNC: 1.5 MG/DL (ref 1.5–2.5)
MCH RBC QN AUTO: 27.8 PG (ref 27–33)
MCH RBC QN AUTO: 27.9 PG (ref 27–33)
MCHC RBC AUTO-ENTMCNC: 33.7 G/DL (ref 32.2–35.5)
MCHC RBC AUTO-ENTMCNC: 34.2 G/DL (ref 32.2–35.5)
MCV RBC AUTO: 81.4 FL (ref 81.4–97.8)
MCV RBC AUTO: 82.4 FL (ref 81.4–97.8)
MICRO URNS: ABNORMAL
MONOCYTES # BLD AUTO: 0.37 K/UL (ref 0–0.85)
MONOCYTES # BLD AUTO: 0.45 K/UL (ref 0–0.85)
MONOCYTES NFR BLD AUTO: 4.2 % (ref 0–13.4)
MONOCYTES NFR BLD AUTO: 7.2 % (ref 0–13.4)
NEUTROPHILS # BLD AUTO: 5.32 K/UL (ref 1.82–7.42)
NEUTROPHILS # BLD AUTO: 8.13 K/UL (ref 1.82–7.42)
NEUTROPHILS NFR BLD: 85.4 % (ref 44–72)
NEUTROPHILS NFR BLD: 92.5 % (ref 44–72)
NITRITE UR QL STRIP.AUTO: NEGATIVE
NRBC # BLD AUTO: 0 K/UL
NRBC # BLD AUTO: 0 K/UL
NRBC BLD-RTO: 0 /100 WBC (ref 0–0.2)
NRBC BLD-RTO: 0 /100 WBC (ref 0–0.2)
PH UR STRIP.AUTO: 6 [PH] (ref 5–8)
PLATELET # BLD AUTO: 200 K/UL (ref 164–446)
PLATELET # BLD AUTO: 203 K/UL (ref 164–446)
PMV BLD AUTO: 10 FL (ref 9–12.9)
PMV BLD AUTO: 10.4 FL (ref 9–12.9)
POTASSIUM SERPL-SCNC: 3.5 MMOL/L (ref 3.6–5.5)
PROT SERPL-MCNC: 7.4 G/DL (ref 6–8.2)
PROT UR QL STRIP: NEGATIVE MG/DL
RBC # BLD AUTO: 4.1 M/UL (ref 4.2–5.4)
RBC # BLD AUTO: 4.63 M/UL (ref 4.2–5.4)
RBC # URNS HPF: ABNORMAL /HPF
RBC UR QL AUTO: NEGATIVE
RH BLD: NORMAL
RSV RNA SPEC QL NAA+PROBE: NEGATIVE
RUBV AB SER QL: 190 IU/ML
SARS-COV-2 RNA RESP QL NAA+PROBE: NOTDETECTED
SODIUM SERPL-SCNC: 134 MMOL/L (ref 135–145)
SP GR UR STRIP.AUTO: 1.01
T PALLIDUM AB SER QL IA: ABNORMAL
TROPONIN T SERPL-MCNC: <6 NG/L (ref 6–19)
TROPONIN T SERPL-MCNC: <6 NG/L (ref 6–19)
UROBILINOGEN UR STRIP.AUTO-MCNC: 0.2 MG/DL
WBC # BLD AUTO: 6.2 K/UL (ref 4.8–10.8)
WBC # BLD AUTO: 8.8 K/UL (ref 4.8–10.8)
WBC #/AREA URNS HPF: ABNORMAL /HPF

## 2024-01-29 PROCEDURE — 93005 ELECTROCARDIOGRAM TRACING: CPT | Performed by: STUDENT IN AN ORGANIZED HEALTH CARE EDUCATION/TRAINING PROGRAM

## 2024-01-29 PROCEDURE — 83735 ASSAY OF MAGNESIUM: CPT

## 2024-01-29 PROCEDURE — 80053 COMPREHEN METABOLIC PANEL: CPT

## 2024-01-29 PROCEDURE — 93010 ELECTROCARDIOGRAM REPORT: CPT | Performed by: INTERNAL MEDICINE

## 2024-01-29 PROCEDURE — 99285 EMERGENCY DEPT VISIT HI MDM: CPT

## 2024-01-29 PROCEDURE — 36415 COLL VENOUS BLD VENIPUNCTURE: CPT

## 2024-01-29 PROCEDURE — 86762 RUBELLA ANTIBODY: CPT

## 2024-01-29 PROCEDURE — 700111 HCHG RX REV CODE 636 W/ 250 OVERRIDE (IP): Mod: JZ | Performed by: STUDENT IN AN ORGANIZED HEALTH CARE EDUCATION/TRAINING PROGRAM

## 2024-01-29 PROCEDURE — 76805 OB US >/= 14 WKS SNGL FETUS: CPT

## 2024-01-29 PROCEDURE — 86901 BLOOD TYPING SEROLOGIC RH(D): CPT

## 2024-01-29 PROCEDURE — 93005 ELECTROCARDIOGRAM TRACING: CPT | Performed by: EMERGENCY MEDICINE

## 2024-01-29 PROCEDURE — 770006 HCHG ROOM/CARE - MED/SURG/GYN SEMI*

## 2024-01-29 PROCEDURE — 76815 OB US LIMITED FETUS(S): CPT

## 2024-01-29 PROCEDURE — 84484 ASSAY OF TROPONIN QUANT: CPT

## 2024-01-29 PROCEDURE — 96375 TX/PRO/DX INJ NEW DRUG ADDON: CPT

## 2024-01-29 PROCEDURE — 85379 FIBRIN DEGRADATION QUANT: CPT

## 2024-01-29 PROCEDURE — 0241U HCHG SARS-COV-2 COVID-19 NFCT DS RESP RNA 4 TRGT ED POC: CPT

## 2024-01-29 PROCEDURE — 99222 1ST HOSP IP/OBS MODERATE 55: CPT | Performed by: STUDENT IN AN ORGANIZED HEALTH CARE EDUCATION/TRAINING PROGRAM

## 2024-01-29 PROCEDURE — 700105 HCHG RX REV CODE 258

## 2024-01-29 PROCEDURE — 96365 THER/PROPH/DIAG IV INF INIT: CPT

## 2024-01-29 PROCEDURE — 700111 HCHG RX REV CODE 636 W/ 250 OVERRIDE (IP): Mod: JZ

## 2024-01-29 PROCEDURE — 700111 HCHG RX REV CODE 636 W/ 250 OVERRIDE (IP): Mod: UD | Performed by: EMERGENCY MEDICINE

## 2024-01-29 PROCEDURE — 84703 CHORIONIC GONADOTROPIN ASSAY: CPT

## 2024-01-29 PROCEDURE — 81001 URINALYSIS AUTO W/SCOPE: CPT

## 2024-01-29 PROCEDURE — 86850 RBC ANTIBODY SCREEN: CPT

## 2024-01-29 PROCEDURE — 700102 HCHG RX REV CODE 250 W/ 637 OVERRIDE(OP): Performed by: STUDENT IN AN ORGANIZED HEALTH CARE EDUCATION/TRAINING PROGRAM

## 2024-01-29 PROCEDURE — 700105 HCHG RX REV CODE 258: Mod: UD | Performed by: OBSTETRICS & GYNECOLOGY

## 2024-01-29 PROCEDURE — 96374 THER/PROPH/DIAG INJ IV PUSH: CPT

## 2024-01-29 PROCEDURE — 87040 BLOOD CULTURE FOR BACTERIA: CPT

## 2024-01-29 PROCEDURE — 96366 THER/PROPH/DIAG IV INF ADDON: CPT

## 2024-01-29 PROCEDURE — 71045 X-RAY EXAM CHEST 1 VIEW: CPT

## 2024-01-29 PROCEDURE — 87340 HEPATITIS B SURFACE AG IA: CPT

## 2024-01-29 PROCEDURE — 86592 SYPHILIS TEST NON-TREP QUAL: CPT

## 2024-01-29 PROCEDURE — 700105 HCHG RX REV CODE 258: Mod: UD | Performed by: EMERGENCY MEDICINE

## 2024-01-29 PROCEDURE — A9270 NON-COVERED ITEM OR SERVICE: HCPCS | Performed by: OBSTETRICS & GYNECOLOGY

## 2024-01-29 PROCEDURE — 87086 URINE CULTURE/COLONY COUNT: CPT

## 2024-01-29 PROCEDURE — 93005 ELECTROCARDIOGRAM TRACING: CPT

## 2024-01-29 PROCEDURE — A9270 NON-COVERED ITEM OR SERVICE: HCPCS | Performed by: STUDENT IN AN ORGANIZED HEALTH CARE EDUCATION/TRAINING PROGRAM

## 2024-01-29 PROCEDURE — 86900 BLOOD TYPING SEROLOGIC ABO: CPT

## 2024-01-29 PROCEDURE — 86780 TREPONEMA PALLIDUM: CPT

## 2024-01-29 PROCEDURE — 83605 ASSAY OF LACTIC ACID: CPT | Mod: 91

## 2024-01-29 PROCEDURE — 700102 HCHG RX REV CODE 250 W/ 637 OVERRIDE(OP): Performed by: OBSTETRICS & GYNECOLOGY

## 2024-01-29 PROCEDURE — 87389 HIV-1 AG W/HIV-1&-2 AB AG IA: CPT

## 2024-01-29 PROCEDURE — 85025 COMPLETE CBC W/AUTO DIFF WBC: CPT | Mod: 91

## 2024-01-29 PROCEDURE — 84702 CHORIONIC GONADOTROPIN TEST: CPT

## 2024-01-29 RX ORDER — POTASSIUM CHLORIDE 20 MEQ/1
40 TABLET, EXTENDED RELEASE ORAL ONCE
Status: COMPLETED | OUTPATIENT
Start: 2024-01-29 | End: 2024-01-29

## 2024-01-29 RX ORDER — SODIUM CHLORIDE, SODIUM LACTATE, POTASSIUM CHLORIDE, AND CALCIUM CHLORIDE .6; .31; .03; .02 G/100ML; G/100ML; G/100ML; G/100ML
500 INJECTION, SOLUTION INTRAVENOUS ONCE
Status: COMPLETED | OUTPATIENT
Start: 2024-01-29 | End: 2024-01-29

## 2024-01-29 RX ORDER — ENOXAPARIN SODIUM 100 MG/ML
40 INJECTION SUBCUTANEOUS DAILY
Status: DISCONTINUED | OUTPATIENT
Start: 2024-01-29 | End: 2024-01-31 | Stop reason: HOSPADM

## 2024-01-29 RX ORDER — CEFTRIAXONE 1 G/1
1000 INJECTION, POWDER, FOR SOLUTION INTRAMUSCULAR; INTRAVENOUS ONCE
Status: COMPLETED | OUTPATIENT
Start: 2024-01-29 | End: 2024-01-29

## 2024-01-29 RX ORDER — ACETAMINOPHEN 325 MG/1
650 TABLET ORAL EVERY 4 HOURS PRN
Status: DISCONTINUED | OUTPATIENT
Start: 2024-01-29 | End: 2024-01-31 | Stop reason: HOSPADM

## 2024-01-29 RX ORDER — DIPHENHYDRAMINE HYDROCHLORIDE 50 MG/ML
25 INJECTION INTRAMUSCULAR; INTRAVENOUS ONCE
Status: COMPLETED | OUTPATIENT
Start: 2024-01-29 | End: 2024-01-29

## 2024-01-29 RX ORDER — MAGNESIUM SULFATE HEPTAHYDRATE 40 MG/ML
2 INJECTION, SOLUTION INTRAVENOUS ONCE
Status: COMPLETED | OUTPATIENT
Start: 2024-01-29 | End: 2024-01-29

## 2024-01-29 RX ORDER — METOCLOPRAMIDE HYDROCHLORIDE 5 MG/ML
10 INJECTION INTRAMUSCULAR; INTRAVENOUS ONCE
Status: COMPLETED | OUTPATIENT
Start: 2024-01-29 | End: 2024-01-29

## 2024-01-29 RX ORDER — VITAMIN A ACETATE, BETA CAROTENE, ASCORBIC ACID, CHOLECALCIFEROL, .ALPHA.-TOCOPHEROL ACETATE, DL-, THIAMINE MONONITRATE, RIBOFLAVIN, NIACINAMIDE, PYRIDOXINE HYDROCHLORIDE, FOLIC ACID, CYANOCOBALAMIN, CALCIUM CARBONATE, FERROUS FUMARATE, ZINC OXIDE, CUPRIC OXIDE 3080; 12; 120; 400; 1; 1.84; 3; 20; 22; 920; 25; 200; 27; 10; 2 [IU]/1; UG/1; MG/1; [IU]/1; MG/1; MG/1; MG/1; MG/1; MG/1; [IU]/1; MG/1; MG/1; MG/1; MG/1; MG/1
1 TABLET, FILM COATED ORAL
Status: DISCONTINUED | OUTPATIENT
Start: 2024-01-29 | End: 2024-01-31 | Stop reason: HOSPADM

## 2024-01-29 RX ORDER — SODIUM CHLORIDE, SODIUM LACTATE, POTASSIUM CHLORIDE, CALCIUM CHLORIDE 600; 310; 30; 20 MG/100ML; MG/100ML; MG/100ML; MG/100ML
1000 INJECTION, SOLUTION INTRAVENOUS ONCE
Status: COMPLETED | OUTPATIENT
Start: 2024-01-29 | End: 2024-01-29

## 2024-01-29 RX ORDER — SODIUM CHLORIDE, SODIUM LACTATE, POTASSIUM CHLORIDE, CALCIUM CHLORIDE 600; 310; 30; 20 MG/100ML; MG/100ML; MG/100ML; MG/100ML
INJECTION, SOLUTION INTRAVENOUS CONTINUOUS
Status: DISCONTINUED | OUTPATIENT
Start: 2024-01-29 | End: 2024-01-31 | Stop reason: HOSPADM

## 2024-01-29 RX ADMIN — ENOXAPARIN SODIUM 40 MG: 100 INJECTION SUBCUTANEOUS at 18:09

## 2024-01-29 RX ADMIN — POTASSIUM CHLORIDE 40 MEQ: 20 TABLET, EXTENDED RELEASE ORAL at 11:31

## 2024-01-29 RX ADMIN — ACETAMINOPHEN 650 MG: 325 TABLET, FILM COATED ORAL at 18:09

## 2024-01-29 RX ADMIN — METOCLOPRAMIDE 10 MG: 5 INJECTION, SOLUTION INTRAMUSCULAR; INTRAVENOUS at 02:16

## 2024-01-29 RX ADMIN — MAGNESIUM SULFATE HEPTAHYDRATE 2 G: 2 INJECTION, SOLUTION INTRAVENOUS at 11:27

## 2024-01-29 RX ADMIN — SODIUM CHLORIDE, POTASSIUM CHLORIDE, SODIUM LACTATE AND CALCIUM CHLORIDE: 600; 310; 30; 20 INJECTION, SOLUTION INTRAVENOUS at 06:32

## 2024-01-29 RX ADMIN — SODIUM CHLORIDE, POTASSIUM CHLORIDE, SODIUM LACTATE AND CALCIUM CHLORIDE 500 ML: 600; 310; 30; 20 INJECTION, SOLUTION INTRAVENOUS at 05:59

## 2024-01-29 RX ADMIN — PRENATAL WITH FERROUS FUM AND FOLIC ACID 1 TABLET: 3080; 920; 120; 400; 22; 1.84; 3; 20; 10; 1; 12; 200; 27; 25; 2 TABLET ORAL at 11:31

## 2024-01-29 RX ADMIN — ACETAMINOPHEN 650 MG: 325 TABLET, FILM COATED ORAL at 05:55

## 2024-01-29 RX ADMIN — ACETAMINOPHEN 650 MG: 325 TABLET, FILM COATED ORAL at 11:28

## 2024-01-29 RX ADMIN — SODIUM CHLORIDE, POTASSIUM CHLORIDE, SODIUM LACTATE AND CALCIUM CHLORIDE: 600; 310; 30; 20 INJECTION, SOLUTION INTRAVENOUS at 09:14

## 2024-01-29 RX ADMIN — CEFTRIAXONE SODIUM 1000 MG: 1 INJECTION, POWDER, FOR SOLUTION INTRAMUSCULAR; INTRAVENOUS at 04:11

## 2024-01-29 RX ADMIN — DIPHENHYDRAMINE HYDROCHLORIDE 25 MG: 50 INJECTION, SOLUTION INTRAMUSCULAR; INTRAVENOUS at 02:32

## 2024-01-29 RX ADMIN — SODIUM CHLORIDE, POTASSIUM CHLORIDE, SODIUM LACTATE AND CALCIUM CHLORIDE: 600; 310; 30; 20 INJECTION, SOLUTION INTRAVENOUS at 23:52

## 2024-01-29 RX ADMIN — SODIUM CHLORIDE, POTASSIUM CHLORIDE, SODIUM LACTATE AND CALCIUM CHLORIDE 1000 ML: 600; 310; 30; 20 INJECTION, SOLUTION INTRAVENOUS at 02:35

## 2024-01-29 ASSESSMENT — PAIN DESCRIPTION - PAIN TYPE
TYPE: ACUTE PAIN
TYPE: ACUTE PAIN

## 2024-01-29 ASSESSMENT — COGNITIVE AND FUNCTIONAL STATUS - GENERAL
DAILY ACTIVITIY SCORE: 24
SUGGESTED CMS G CODE MODIFIER DAILY ACTIVITY: CH
TURNING FROM BACK TO SIDE WHILE IN FLAT BAD: A LITTLE
MOBILITY SCORE: 23
SUGGESTED CMS G CODE MODIFIER MOBILITY: CI

## 2024-01-29 ASSESSMENT — ENCOUNTER SYMPTOMS
COUGH: 0
BLURRED VISION: 0
SORE THROAT: 0
VOMITING: 0
NAUSEA: 0
DIZZINESS: 0
FEVER: 0
ABDOMINAL PAIN: 0
HEADACHES: 1
DOUBLE VISION: 0
SHORTNESS OF BREATH: 0
PALPITATIONS: 0
CHILLS: 0
HEARTBURN: 0
MYALGIAS: 1

## 2024-01-29 ASSESSMENT — LIFESTYLE VARIABLES
EVER HAD A DRINK FIRST THING IN THE MORNING TO STEADY YOUR NERVES TO GET RID OF A HANGOVER: NO
CONSUMPTION TOTAL: NEGATIVE
TOTAL SCORE: 0
AVERAGE NUMBER OF DAYS PER WEEK YOU HAVE A DRINK CONTAINING ALCOHOL: 0
ON A TYPICAL DAY WHEN YOU DRINK ALCOHOL HOW MANY DRINKS DO YOU HAVE: 0
HAVE YOU EVER FELT YOU SHOULD CUT DOWN ON YOUR DRINKING: NO
TOTAL SCORE: 0
EVER FELT BAD OR GUILTY ABOUT YOUR DRINKING: NO
HAVE PEOPLE ANNOYED YOU BY CRITICIZING YOUR DRINKING: NO
TOTAL SCORE: 0
ALCOHOL_USE: NO
HOW MANY TIMES IN THE PAST YEAR HAVE YOU HAD 5 OR MORE DRINKS IN A DAY: 0

## 2024-01-29 ASSESSMENT — PATIENT HEALTH QUESTIONNAIRE - PHQ9
2. FEELING DOWN, DEPRESSED, IRRITABLE, OR HOPELESS: NOT AT ALL
1. LITTLE INTEREST OR PLEASURE IN DOING THINGS: NOT AT ALL
SUM OF ALL RESPONSES TO PHQ9 QUESTIONS 1 AND 2: 0

## 2024-01-29 ASSESSMENT — FIBROSIS 4 INDEX: FIB4 SCORE: 1.304439534819456474

## 2024-01-29 NOTE — PROGRESS NOTES
Patient seen after midnight by Dr Palafox, please see Consult Note for full details.    Pt seen and examined by myself, this afternoon just has a mild headache, otherwise feeling better. Denies fever/chills.     A qualified  was used during this encounter.  ’s name/ID number was Cierra #[id number] and mode of interpretation was video call. Discussed plan of care and offered opportunity for any questions / concerns.     Hospital Course:  Carolina Leong is a 33 y.o. female  currently 5 months pregnant admitted 2024 for headache, generalized body aches, dysuria. Urinalysis consistent with UTI. Dr. Ruiz was contacted by ED and requested hospitalist consult to treat for pyelonephritis.     Assessment and plan:  Active Hospital Problems    Diagnosis     *UTI (urinary tract infection) [N39.0]     Pyelonephritis [N12]     Tachycardia [R00.0]     Hypokalemia [E87.6]     Headache [R51.9]      Plan:  - IV ceftriaxone for today  - Monitor overnight  - Once afebrile for at least 24 hours and symptomatically improved, will consider transition to oral agent such as cephalexin   - Monitor potassium and magnesium, replace for K<4, Mag <2.   - Maintenance IVF     Dispo:Inpatient med/surg    Patient was discussed with bedside RN/SW     REJI Cannon

## 2024-01-29 NOTE — ED TRIAGE NOTES
Carolina Leong  33 y.o.  female  Chief Complaint   Patient presents with    Generalized Body Aches     X 1 day    Headache     X 1 day    Chest Pain     C/o mid sternal chest pain started today. Described as pressure off and on. + SOB. Patient also states that she is 5 months pregnant with no prenatal care.

## 2024-01-29 NOTE — CARE PLAN
MEDICATIONS:  · See Medication List (bring to your doctor appointments).  VACCINES:  Most Recent Immunizations   Administered Date(s) Administered   • Hepatitis B Adult 12/07/2016   • INFLUENZA QUADRIVALENT 09/29/2014   • Influenza 11/01/2016   • MMR 12/15/2016   • Pneumococcal Polysaccharide Adult 01/09/2009   • Td:Adult type tetanus/diphtheria 04/07/2005   • Tdap 07/27/2015       ACTIVITY:  · Weight yourself daily (first thing in the morning, with same amount of clothes on) unless told otherwise by your doctor.  · Continue activity as your were in the hospital, slowly increase to what you were doing previously.  · Up as desired / no restrictions.    SMOKING:  · Avoid all tobacco products and second hand smoke.  · Smoking Cessation Counseling offered.  · [place] Toll Free Quit Line: [phone]    DIET:  · Limit salt and salty foods unless told otherwise by your doctor.  · No Restrictions    · Trouble breathing or chest pain - CALL 911    CONTACT YOUR DOCTOR IF:  · You have symptoms that are not \"normal\" for you.  · You have new or worse symptoms or pain, not relieved by medicine or rest.  · Temperature greater than 101 degrees F, chills or flu like symptoms.  · You gain more than 3 pounds in 2 days.  · Increased swelling, redness or drainage.       The patient is Stable - Low risk of patient condition declining or worsening         Progress made toward(s) clinical / shift goals:        Problem: Knowledge Deficit - Standard  Goal: Patient and family/care givers will demonstrate understanding of plan of care, disease process/condition, diagnostic tests and medications  Outcome: Progressing       Patient is not progressing towards the following goals:

## 2024-01-29 NOTE — ASSESSMENT & PLAN NOTE
Came with nausea and vomiting with dysuria  UA showed white blood cell, patient is pregnant  Continue ceftriaxone  Waiting for urine and blood culture

## 2024-01-29 NOTE — H&P
History and Physical      Carolina Leong is a 33 y.o. female  22w5d by US who was admitted for Acute pyelonephritis.     Subjective:   Pt presented to the ER for generalized body aches and headaches and chest pains.   Denies fever but with dysuria.     ROS: A comprehensive review of systems was negative.    Past Medical History:   Diagnosis Date    BV (bacterial vaginosis) 2017     History reviewed. No pertinent surgical history.  History reviewed. No pertinent family history.  OB History    Para Term  AB Living   6 3 3   1 3   SAB IAB Ectopic Molar Multiple Live Births   1         3      # Outcome Date GA Lbr Vijay/2nd Weight Sex Delivery Anes PTL Lv   6 Current            5 Term 07/30/15 39w0d  3.175 kg (7 lb) M Vag-Spont EPI N JOHN      Birth Comments: Pt states no complications.    4 Term 14 39w0d  3.175 kg (7 lb) M Vag-Spont None Y JOHN      Birth Comments: Pt states no complications   3 Term 12 39w6d  3.515 kg (7 lb 12 oz) M Vag-Spont EPI N JOHN      Birth Comments: Denies complications.   2 SAB  12w0d             Birth Comments: no D&C needed    1               Social History     Tobacco Use    Smoking status: Never    Smokeless tobacco: Never   Substance Use Topics    Alcohol use: No     Comment: occ before pregnancy     Drug use: No     Allergies: Patient has no known allergies.    Current Facility-Administered Medications:     lactated ringers infusion, , Intravenous, Continuous, Katie Cason M.D., Last Rate: 175 mL/hr at 24 0632, New Bag at 24 0632    acetaminophen (Tylenol) tablet 650 mg, 650 mg, Oral, Q4HRS PRN, Katie Cason M.D., 650 mg at 24 0555    [START ON 2024] cefTRIAXone (Rocephin) syringe 1,000 mg, 1,000 mg, Intravenous, Q24HRS, Bernardo Palafox M.D.    enoxaparin (Lovenox) inj 40 mg, 40 mg, Subcutaneous, DAILY AT 1800, Bernardo Palafox M.D.    potassium chloride SA (Kdur) tablet 40  "mEq, 40 mEq, Oral, Once, Bernardo Palafox M.D.    magnesium sulfate IVPB premix 2 g, 2 g, Intravenous, Once, KIRK CannonRUzielNUziel    No prenatal care yet.   Patient Active Problem List    Diagnosis Date Noted    UTI (urinary tract infection) 2024    Tachycardia 2019    Overweight (BMI 25.0-29.9) 2019    Hypokalemia 2019    Sepsis (HCC) 2019    Headache 2019    Non-compliant patient 2017    Indication for care in labor or delivery 2017    Glucose intolerance (impaired glucose tolerance) 2017     Class: Acute    Abnormal GTT (glucose tolerance test) 2017    Abnormal pregnancy US 2017    Encounter for supervision of other normal pregnancy, third trimester 2017    BV (bacterial vaginosis) 2017    History of Gestational diabetes 2015    Late prenatal care @ 31 wks  2015    History of  labor with delivery at 35 wks  2015       Objective:      /57   Pulse (!) 123   Temp 36.9 °C (98.4 °F) (Temporal)   Resp 18   Ht 1.6 m (5' 3\")   Wt 79.3 kg (174 lb 13.2 oz)   SpO2 95%     General:   no acute distress, alert, cooperative   Skin:   normal   HEENT:  Sclera clear, anicteric   Lungs:   CTA bilateral   Heart:   S1, S2 normal, no murmur, click, rub or gallop, regular rate and rhythm, no hepatosplenomegaly, S1, S2 normal,\"no JVD   Abdomen:   gravid, NT   Fetal dopplers - 160s                                                   Lab Review  Recent Results (from the past 5880 hour(s))   EKG (Now)    Collection Time: 24 12:42 AM   Result Value Ref Range    Report       [facility] Emergency Dept.    Test Date:  2024  Pt Name:    GWEN WAY IZABELLA      Department: ER  MRN:        [id number]                      Room:  Gender:     Female                       Technician: [id number]  :        1990                   Requested By:ER TRIAGE PROTOCOL  Order #:    [id number]              "       Reading MD:    Measurements  Intervals                                Axis  Rate:       138                          P:          23  MN:         111                          QRS:        83  QRSD:       82                           T:          -4  QT:         298  QTc:        452    Interpretive Statements  Sinus tachycardia  Borderline T abnormalities, inferior leads  Compared to ECG 12/02/2019 09:57:32  T-wave abnormality now present     CBC with Differential    Collection Time: 01/29/24  1:02 AM   Result Value Ref Range    WBC 8.8 4.8 - 10.8 K/uL    RBC 4.63 4.20 - 5.40 M/uL    Hemoglobin 12.9 12.0 - 16.0 g/dL    Hematocrit 37.7 37.0 - 47.0 %    MCV 81.4 81.4 - 97.8 fL    MCH 27.9 27.0 - 33.0 pg    MCHC 34.2 32.2 - 35.5 g/dL    RDW 39.7 35.9 - 50.0 fL    Platelet Count 200 164 - 446 K/uL    MPV 10.4 9.0 - 12.9 fL    Neutrophils-Polys 92.50 (H) 44.00 - 72.00 %    Lymphocytes 2.80 (L) 22.00 - 41.00 %    Monocytes 4.20 0.00 - 13.40 %    Eosinophils 0.10 0.00 - 6.90 %    Basophils 0.10 0.00 - 1.80 %    Immature Granulocytes 0.30 0.00 - 0.90 %    Nucleated RBC 0.00 0.00 - 0.20 /100 WBC    Neutrophils (Absolute) 8.13 (H) 1.82 - 7.42 K/uL    Lymphs (Absolute) 0.25 (L) 1.00 - 4.80 K/uL    Monos (Absolute) 0.37 0.00 - 0.85 K/uL    Eos (Absolute) 0.01 0.00 - 0.51 K/uL    Baso (Absolute) 0.01 0.00 - 0.12 K/uL    Immature Granulocytes (abs) 0.03 0.00 - 0.11 K/uL    NRBC (Absolute) 0.00 K/uL   Complete Metabolic Panel (CMP)    Collection Time: 01/29/24  1:02 AM   Result Value Ref Range    Sodium 134 (L) 135 - 145 mmol/L    Potassium 3.5 (L) 3.6 - 5.5 mmol/L    Chloride 102 96 - 112 mmol/L    Co2 16 (L) 20 - 33 mmol/L    Anion Gap 16.0 7.0 - 16.0    Glucose 101 (H) 65 - 99 mg/dL    Bun 6 (L) 8 - 22 mg/dL    Creatinine 0.32 (L) 0.50 - 1.40 mg/dL    Calcium 8.9 8.5 - 10.5 mg/dL    Correct Calcium 9.2 8.5 - 10.5 mg/dL    AST(SGOT) 25 12 - 45 U/L    ALT(SGPT) 10 2 - 50 U/L    Alkaline Phosphatase 104 (H) 30 - 99 U/L    Total  Bilirubin 1.0 0.1 - 1.5 mg/dL    Albumin 3.6 3.2 - 4.9 g/dL    Total Protein 7.4 6.0 - 8.2 g/dL    Globulin 3.8 (H) 1.9 - 3.5 g/dL    A-G Ratio 0.9 g/dL   Troponin STAT    Collection Time: 01/29/24  1:02 AM   Result Value Ref Range    Troponin T <6 6 - 19 ng/L   BETA-HCG QUALITATIVE SERUM    Collection Time: 01/29/24  1:02 AM   Result Value Ref Range    Beta-Hcg Qualitative Serum Positive (A) Negative   D-DIMER    Collection Time: 01/29/24  1:02 AM   Result Value Ref Range    D-Dimer 0.42 0.00 - 0.50 ug/mL (FEU)   BETA-HCG QUANTITATIVE SERUM    Collection Time: 01/29/24  1:02 AM   Result Value Ref Range    Bhcg 34190.0 (H) 0.0 - 5.0 mIU/mL   ESTIMATED GFR    Collection Time: 01/29/24  1:02 AM   Result Value Ref Range    GFR (CKD-EPI) 141 >60 mL/min/1.73 m 2   Urinalysis    Collection Time: 01/29/24  1:05 AM    Specimen: Urine   Result Value Ref Range    Color Yellow     Character Cloudy (A)     Specific Gravity 1.007 <1.035    Ph 6.0 5.0 - 8.0    Glucose Negative Negative mg/dL    Ketones Negative Negative mg/dL    Protein Negative Negative mg/dL    Bilirubin Negative Negative    Urobilinogen, Urine 0.2 Negative    Nitrite Negative Negative    Leukocyte Esterase Large (A) Negative    Occult Blood Negative Negative    Micro Urine Req Microscopic    URINE MICROSCOPIC (W/UA)    Collection Time: 01/29/24  1:05 AM   Result Value Ref Range    WBC 20-50 (A) /hpf    RBC 0-2 /hpf    Bacteria Moderate (A) None /hpf    Epithelial Cells Many (A) /hpf    Hyaline Cast 0-2 /lpf   Lactic Acid    Collection Time: 01/29/24  1:10 AM   Result Value Ref Range    Lactic Acid 1.0 0.5 - 2.0 mmol/L   POC CoV-2, FLU A/B, RSV by PCR    Collection Time: 01/29/24  1:19 AM   Result Value Ref Range    POC Influenza A RNA, PCR Negative Negative    POC Influenza B RNA, PCR Negative Negative    POC RSV, by PCR Negative Negative    POC SARS-CoV-2, PCR NotDetected    Troponin in two (2) hours    Collection Time: 01/29/24  3:10 AM   Result Value Ref  Range    Troponin T <6 6 - 19 ng/L   MAGNESIUM    Collection Time: 24  3:10 AM   Result Value Ref Range    Magnesium 1.5 1.5 - 2.5 mg/dL   EKG    Collection Time: 24  6:23 AM   Result Value Ref Range    Report       [facility]    Test Date:  2024  Pt Name:    CAROLINA LEONG      Department: CPU  MRN:        [id number]                      Room:       T217  Gender:     Female                       Technician: CAITY  :        1990                   Requested By:SILVIA CHAVEZ  Order #:    [id number]                    Reading MD: Roby Cedillo MD    Measurements  Intervals                                Axis  Rate:       122                          P:          22  MA:         126                          QRS:        73  QRSD:       87                           T:          -12  QT:         325  QTc:        463    Interpretive Statements  Sinus tachycardia  Borderline T abnormalities, inferior and lateral leads  Electronically Signed On 2024 08:03:05 PST by Roby Cedillo MD          Assessment:   Carolina Leong 22.5  Acute Pyelonephritis   Patient Active Problem List    Diagnosis Date Noted    UTI (urinary tract infection) 2024    Tachycardia 2019    Overweight (BMI 25.0-29.9) 2019    Hypokalemia 2019    Sepsis (HCC) 2019    Headache 2019    Non-compliant patient 2017    Indication for care in labor or delivery 2017    Glucose intolerance (impaired glucose tolerance) 2017     Class: Acute    Abnormal GTT (glucose tolerance test) 2017    Abnormal pregnancy US 2017    Encounter for supervision of other normal pregnancy, third trimester 2017    BV (bacterial vaginosis) 2017    History of Gestational diabetes 2015    Late prenatal care @ 31 wks  2015    History of  labor with delivery at 35 wks  2015   .      Plan:     Hospitalist consult done re: mgt of Acute  Pyelonephritis. Thank you.   Fetal dopplers c/o L&D. Order PNL while in-house, OBUS complete anatomy.   Pt to establish care with [facility] for OB care once d/c'd from [facility]U.   Encounter with pt done with a . All questions addressed to stated satisfaction.

## 2024-01-29 NOTE — PROGRESS NOTES
Pt transferred to new room with transport, all personal belongings, chart, and any stored meds included. Report to Namita herrera RN.

## 2024-01-29 NOTE — PROGRESS NOTES
4 Eyes Skin Assessment Completed by MINISTERIO Breaux and MINISTERIO Pradhan.    Head WDL  Ears WDL  Nose WDL  Mouth WDL  Neck WDL  Breast/Chest WDL  Shoulder Blades WDL  Spine WDL  (R) Arm/Elbow/Hand WDL  (L) Arm/Elbow/Hand WDL  Abdomen WDL  Groin WDL  Scrotum/Coccyx/Buttocks WDL  (R) Leg WDL  (L) Leg WDL  (R) Heel/Foot/Toe WDL  (L) Heel/Foot/Toe WDL          Devices In Places None      Interventions In Place N/A    Possible Skin Injury No    Pictures Uploaded Into Epic N/A  Wound Consult Placed N/A  RN Wound Prevention Protocol Ordered No

## 2024-01-29 NOTE — ED PROVIDER NOTES
"ER Provider Note    Scribed for Dr. Daniel Varner M.D. by Thuan Butcher. 2024  12:57 AM    Primary Care Provider: Pcp Pt States None    CHIEF COMPLAINT  Chief Complaint   Patient presents with    Generalized Body Aches     X 1 day    Headache     X 1 day    Chest Pain     C/o mid sternal chest pain started today. Described as pressure off and on. + SOB. Patient also states that she is 5 months pregnant with no prenatal care.        EXTERNAL RECORDS REVIEWED  Review of records inpatient reveals the patient was hospitalized in 2019 for sepsis, but was later diagnosed with viral syndrome    HPI/ROS  Carolina Leong is a  5 months pregnant 33 y.o. female who presents to the ED for evaluation of chest pain onset  today. The patient reports associated symptoms of headache and body aches since yesterday.  She states her headache became stronger yesterday around 4-5 PM. She reports new chest pain and shortness of breath today. She reports pain with inspiration. The patient reports she has not been seen by OB for her current pregnancy. The patient notes no alleviating factors. No known drug allergies     PAST MEDICAL HISTORY  Past Medical History:   Diagnosis Date    BV (bacterial vaginosis) 2017     SURGICAL HISTORY  History reviewed. No pertinent surgical history.    FAMILY HISTORY  History reviewed. No pertinent family history.    SOCIAL HISTORY   reports that she has never smoked. She has never used smokeless tobacco. She reports that she does not drink alcohol and does not use drugs.    CURRENT MEDICATIONS  Previous Medications    ACETAMINOPHEN (TYLENOL) 325 MG TAB    Take 650 mg by mouth every 6 hours as needed (headache).    IBUPROFEN (MOTRIN) 600 MG TAB    Take 1 Tab by mouth every 6 hours as needed for Mild Pain or Moderate Pain.     ALLERGIES  Patient has no known allergies.    PHYSICAL EXAM  /84   Pulse (!) 147   Temp 36.9 °C (98.5 °F) (Temporal)   Resp 20   Ht 1.6 m (5' 3\")   " LMP 08/16/2023   SpO2 98%   BMI 30.30 kg/m²   Constitutional: Alert in no apparent distress.  HENT: No signs of trauma, Bilateral external ears normal, Nose normal.   Eyes: Pupils are equal and reactive, Conjunctiva normal, Non-icteric.   Neck: Normal range of motion, No tenderness, Supple,   Cardiovascular: Tachycardic, Normal rhythm, no murmurs.   Thorax & Lungs: Normal breath sounds, No respiratory distress, No wheezing, No chest tenderness.   Abdomen: Bowel sounds normal, Soft, No tenderness, No masses, No pulsatile masses. No peritoneal signs.  Skin: Warm, Dry, No erythema, No rash.   Back: No bony tenderness, No CVA tenderness.   Extremities: No edema, No tenderness, No cyanosis, no tenderness  Neurologic: Moving all extremities. Alert and oriented x4   Psychiatric: Affect normal     DIAGNOSTIC STUDIES & PROCEDURES    Labs:   Labs Reviewed   CBC WITH DIFFERENTIAL - Abnormal; Notable for the following components:       Result Value    Neutrophils-Polys 92.50 (*)     Lymphocytes 2.80 (*)     Neutrophils (Absolute) 8.13 (*)     Lymphs (Absolute) 0.25 (*)     All other components within normal limits    Narrative:     Biotin intake of greater than 5 mg per day may interfere with  troponin levels, causing false low values.   COMP METABOLIC PANEL - Abnormal; Notable for the following components:    Sodium 134 (*)     Potassium 3.5 (*)     Co2 16 (*)     Glucose 101 (*)     Bun 6 (*)     Creatinine 0.32 (*)     Alkaline Phosphatase 104 (*)     Globulin 3.8 (*)     All other components within normal limits    Narrative:     Biotin intake of greater than 5 mg per day may interfere with  troponin levels, causing false low values.   URINALYSIS - Abnormal; Notable for the following components:    Character Cloudy (*)     Leukocyte Esterase Large (*)     All other components within normal limits    Narrative:     Indication for culture:->Emergency Room Patient   HCG QUAL SERUM - Abnormal; Notable for the following  components:    Beta-Hcg Qualitative Serum Positive (*)     All other components within normal limits    Narrative:     Biotin intake of greater than 5 mg per day may interfere with  troponin levels, causing false low values.   HCG QUANTITATIVE - Abnormal; Notable for the following components:    Bhcg 05583.0 (*)     All other components within normal limits   URINE MICROSCOPIC (W/UA) - Abnormal; Notable for the following components:    WBC 20-50 (*)     Bacteria Moderate (*)     Epithelial Cells Many (*)     All other components within normal limits    Narrative:     Indication for culture:->Emergency Room Patient   TROPONIN    Narrative:     Biotin intake of greater than 5 mg per day may interfere with  troponin levels, causing false low values.   TROPONIN    Narrative:     Biotin intake of greater than 5 mg per day may interfere with  troponin levels, causing false low values.   LACTIC ACID    Narrative:     Biotin intake of greater than 5 mg per day may interfere with  troponin levels, causing false low values.   D-DIMER   ESTIMATED GFR    Narrative:     Biotin intake of greater than 5 mg per day may interfere with  troponin levels, causing false low values.   LACTIC ACID   URINE CULTURE(NEW)    Narrative:     Indication for culture:->Emergency Room Patient   BLOOD CULTURE    Narrative:     Blood Cultures X2. Draw one blood culture from central line  and one blood culture peripherally. If no line present, draw  blood cultures times two peripherally from different sites.   BLOOD CULTURE    Narrative:     Blood Cultures X2. Blood Cultures X2. Draw one blood culture  from central line and one blood culture peripherally. If no  line present, draw blood cultures times two peripherally from  different sites.   POCT COV-2, FLU A/B, RSV BY PCR   POC COV-2, FLU A/B, RSV BY PCR      All labs reviewed by me.    EKG Interpretation:  Interpreted by me    12 Lead EKG interpreted by me to show:  Sinus tachycardia  Rate  138  Axis: Normal  Intervals: Normal  T-wave inversions  Normal ST segments  My impression of this EKG: T-wave inversions in lead 3 seen in prior EKG. No ST-segment elevation or depression. Does not indicate ischemia or arrhythmia at this time.     Radiology:   The attending Emergency Physician has independently interpreted the diagnostic imaging associated with this visit and is awaiting the final reading from the radiologist, which will be displayed below.    Preliminary interpretation is a follows: IUP seen.  Radiologist interpretation:     US-OB LIMITED TRANSABDOMINAL   Final Result      Single intrauterine pregnancy of an estimated gestational age of 22 weeks, 5 days with an estimated date of delivery of 05/29/2024.      Complete fetal survey was not performed.      DX-CHEST-PORTABLE (1 VIEW)   Final Result      Hypoinflation without other evidence for acute cardiopulmonary disease.         COURSE & MEDICAL DECISION MAKING    ED Observation Status? No; Patient does not meet criteria for ED Observation.     INITIAL ASSESSMENT AND PLAN  Care Narrative:       12:57 AM - Patient seen and evaluated at bedside. CBC with diff, CMP, Troponins, DX-chest, and EKG ordered per nursing protocol. Discussed plan of care, including diagnostic workup to evaluate patient symptoms. Patient agrees to plan of care. Ordered Lactic acid, UA, Urine culture, Blood cultures, Beta HCG qual, CoV-2 Flu A/B and RSV PCR,  Beta HCG quant, D-dimer, Dx-chest, and US-OB limited transabdominal to evaluate.    1:55 AM- Ordered for Reglan 10 mg IV.    2:21 AM - Patient re-evaluated at beside. Discussed patient's condition and treatment plan. Patient's lab and radiology results discussed. The patient understood and is in agreement.      2:55 AM Recheck: Patient re-evaluated at beside. The patient is sleeping upon evaluation, but still tachycardic into the 130s after 500 mL of fluid.      3:15 AM - Patient was reevaluated at bedside. The patient is  still tachycardic after 1L total fluid at this time. She denies chest pain or shortness of breath.     3:30 AM - Paged Hospitalist.     4:16 AM  - I discussed the patient's case and the above findings with Dr. Arana (Hospitalist) who agreed to evaluate the patient for hospitalization.      HYDRATION: Based on the patient's presentation of Tachycardia the patient was given IV fluids. IV Hydration was used because oral hydration was not adequate alone. Upon recheck following hydration, the patient was improved.    ADDITIONAL PROBLEM LIST AND DISPOSITION  Patient with consistent tachycardia and headache.  Regular was given which helped.  The patient has an IUP that is 22 weeks 5 days.  Ultimately after fluids and medication the patient is persistently tachycardic.  There is infection potentially in her urine.  She will need antibiotics for this given her pregnancy.  In addition she will need to be admitted for tachycardia.  She has no significant Sandra derangements.  She will be admitted in guarded condition.  I spoke to OB about this.               DISPOSITION AND DISCUSSIONS  I have discussed management of the patient with the following physicians and MARISOL's: Dr. Mckeon (Hospitalist)   Dr. Adrian to admit.    Discussion of management with other Q or appropriate source(s): None     Barriers to care at this time, including but not limited to: Patient does not have established PCP.     Decision tools and prescription drugs considered including, but not limited to: Antibiotics for potential pyelo .    DISPOSITION:  Patient will be hospitalized by Dr. Mckeon in guarded condition.     FINAL IMPRESSION   1. Pyelonephritis    2. Dysuria    3. Tachycardia         Thuan KRUEGER), am scribing for, and in the presence of, Daniel Varner M.D..    Electronically signed by: Thuan Skaggs), 1/29/2024    Daniel KRUEGER M.D. personally performed the services described in this documentation, as  scribed by Thuan Butcher in my presence, and it is both accurate and complete.    The note accurately reflects work and decisions made by me.  Daniel Varner M.D.  1/29/2024  4:17 AM

## 2024-01-29 NOTE — CONSULTS
Hospital Medicine Consultation    Date of Service  1/29/2024    Referring Physician  Dr. Varner    Consulting Physician  Bernardo Palafox M.D.    Reason for Consultation  Management of pyelonephritis    History of Presenting Illness   services were used in the patient's primary language of Hungarian.     Name or Number: Marcello [id number]  Mode of interpretation: iPad    33 y.o. female who presented 1/29/2024 with is a very pleasant woman, G5, P4, currently 5 months pregnant.  She presented to the ED for chest pain, shortness of breath, as well as headache and generalized bodyaches.  Headache increased over the course of the day and she became increasingly uncomfortable with generalized aches and has been experiencing dysuria as well.  She is also had some nausea and vomiting.  OB ultrasound reviewed, intact fetus measuring 22 weeks 5 days approximately.  Urinalysis was consistent with UTI.  Dr. Ruiz was consulted by the ED and had the patient admitted, requested assistance of hospital medicine for starting treatment for pyelonephritis.  Patient was tachycardic on presentation and has remained tachycardic even after fluid resuscitation.  In my evaluation she reported feeling much better, still has a mild headache however overall has improved.    Review of Systems  Review of Systems   Constitutional:  Positive for malaise/fatigue. Negative for chills and fever.   HENT:  Negative for nosebleeds and sore throat.    Eyes:  Negative for blurred vision and double vision.   Respiratory:  Negative for cough and shortness of breath.    Cardiovascular:  Negative for chest pain and palpitations.   Gastrointestinal:  Negative for abdominal pain, heartburn, nausea and vomiting.   Genitourinary:  Positive for dysuria. Negative for urgency.   Musculoskeletal:  Positive for myalgias.   Neurological:  Positive for headaches. Negative for dizziness.       Past Medical History   has a past medical history  of BV (bacterial vaginosis) (5/2017).    Surgical History   has no past surgical history on file.    Family History  family history is not on file.    Social History   reports that she has never smoked. She has never used smokeless tobacco. She reports that she does not drink alcohol and does not use drugs.    Medications  Prior to Admission Medications   Prescriptions Last Dose Informant Patient Reported? Taking?   acetaminophen (TYLENOL) 325 MG Tab 1/28/2024 at PRN Patient Yes No   Sig: Take 325 mg by mouth every 6 hours as needed (headache).      Facility-Administered Medications: None       Allergies  No Known Allergies    Physical Exam  Temp:  [36.9 °C (98.4 °F)-36.9 °C (98.5 °F)] 36.9 °C (98.4 °F)  Pulse:  [124-147] 134  Resp:  [18-35] 18  BP: (102-125)/(62-84) 115/66  SpO2:  [95 %-98 %] 96 %    Physical Exam  Constitutional:       General: She is not in acute distress.     Appearance: She is not toxic-appearing.   HENT:      Head: Normocephalic and atraumatic.      Nose: Nose normal.      Mouth/Throat:      Mouth: Mucous membranes are moist.      Pharynx: Oropharynx is clear.   Eyes:      Extraocular Movements: Extraocular movements intact.      Conjunctiva/sclera: Conjunctivae normal.   Cardiovascular:      Rate and Rhythm: Regular rhythm. Tachycardia present.      Pulses: Normal pulses.      Heart sounds: Normal heart sounds.   Pulmonary:      Effort: Pulmonary effort is normal.      Breath sounds: Normal breath sounds.   Abdominal:      General: Abdomen is flat.      Palpations: Abdomen is soft.      Tenderness: There is no abdominal tenderness.   Musculoskeletal:         General: No swelling or deformity.      Cervical back: Neck supple. No rigidity.   Skin:     General: Skin is warm and dry.   Neurological:      General: No focal deficit present.      Mental Status: She is oriented to person, place, and time.         Fluids      Laboratory  Recent Labs     01/29/24  0102   WBC 8.8   RBC 4.63   HEMOGLOBIN  12.9   HEMATOCRIT 37.7   MCV 81.4   MCH 27.9   MCHC 34.2   RDW 39.7   PLATELETCT 200   MPV 10.4     Recent Labs     01/29/24  0102   SODIUM 134*   POTASSIUM 3.5*   CHLORIDE 102   CO2 16*   GLUCOSE 101*   BUN 6*   CREATININE 0.32*   CALCIUM 8.9                     Imaging  US-OB LIMITED TRANSABDOMINAL   Final Result      Single intrauterine pregnancy of an estimated gestational age of 22 weeks, 5 days with an estimated date of delivery of 05/29/2024.      Complete fetal survey was not performed.      DX-CHEST-PORTABLE (1 VIEW)   Final Result      Hypoinflation without other evidence for acute cardiopulmonary disease.          Assessment/Plan  * UTI (urinary tract infection)- (present on admission)  Assessment & Plan  2 days of dysuria and developing systemic symptoms.  Tachycardic though no fever or leukocytosis.  Also complaining of chest pain however troponin is negative and EKG nonischemic, currently denies chest pain.  Urinalysis consistent with UTI  -Ceftriaxone  -IV fluid resuscitation  -Follow cultures    Headache- (present on admission)  Assessment & Plan  Improved since presentation, Tylenol as needed    Hypokalemia- (present on admission)  Assessment & Plan  3.5 on presentation, 40 mEq ordered  Monitor BMP    Tachycardia- (present on admission)  Assessment & Plan  Sinus rhythm, in setting of UTI with systemic symptoms.  Continue IV fluids  Will repeat EKG  No chest pain and troponin not elevated

## 2024-01-29 NOTE — PROGRESS NOTES
1020 This RN at bedside for fetal HR doppler,  used to notify patient. See flowsheets.     1225 Using  services, pt denies ctxs, states +FM and denies LOF and VB.

## 2024-01-30 PROBLEM — Z3A.22 22 WEEKS GESTATION OF PREGNANCY: Status: ACTIVE | Noted: 2024-01-30

## 2024-01-30 LAB
ANION GAP SERPL CALC-SCNC: 11 MMOL/L (ref 7–16)
BASOPHILS # BLD AUTO: 0.3 % (ref 0–1.8)
BASOPHILS # BLD: 0.01 K/UL (ref 0–0.12)
BUN SERPL-MCNC: 6 MG/DL (ref 8–22)
CALCIUM SERPL-MCNC: 7.9 MG/DL (ref 8.5–10.5)
CHLORIDE SERPL-SCNC: 107 MMOL/L (ref 96–112)
CO2 SERPL-SCNC: 19 MMOL/L (ref 20–33)
CREAT SERPL-MCNC: 0.25 MG/DL (ref 0.5–1.4)
EOSINOPHIL # BLD AUTO: 0.03 K/UL (ref 0–0.51)
EOSINOPHIL NFR BLD: 0.8 % (ref 0–6.9)
ERYTHROCYTE [DISTWIDTH] IN BLOOD BY AUTOMATED COUNT: 43 FL (ref 35.9–50)
GFR SERPLBLD CREATININE-BSD FMLA CKD-EPI: 149 ML/MIN/1.73 M 2
GLUCOSE SERPL-MCNC: 78 MG/DL (ref 65–99)
HCT VFR BLD AUTO: 33 % (ref 37–47)
HGB BLD-MCNC: 11.1 G/DL (ref 12–16)
IMM GRANULOCYTES # BLD AUTO: 0.02 K/UL (ref 0–0.11)
IMM GRANULOCYTES NFR BLD AUTO: 0.5 % (ref 0–0.9)
LYMPHOCYTES # BLD AUTO: 0.89 K/UL (ref 1–4.8)
LYMPHOCYTES NFR BLD: 23.6 % (ref 22–41)
MAGNESIUM SERPL-MCNC: 1.8 MG/DL (ref 1.5–2.5)
MCH RBC QN AUTO: 28 PG (ref 27–33)
MCHC RBC AUTO-ENTMCNC: 33.6 G/DL (ref 32.2–35.5)
MCV RBC AUTO: 83.3 FL (ref 81.4–97.8)
MONOCYTES # BLD AUTO: 0.56 K/UL (ref 0–0.85)
MONOCYTES NFR BLD AUTO: 14.9 % (ref 0–13.4)
NEUTROPHILS # BLD AUTO: 2.26 K/UL (ref 1.82–7.42)
NEUTROPHILS NFR BLD: 59.9 % (ref 44–72)
NRBC # BLD AUTO: 0 K/UL
NRBC BLD-RTO: 0 /100 WBC (ref 0–0.2)
PLATELET # BLD AUTO: 178 K/UL (ref 164–446)
PMV BLD AUTO: 10.8 FL (ref 9–12.9)
POTASSIUM SERPL-SCNC: 3.6 MMOL/L (ref 3.6–5.5)
PROCALCITONIN SERPL-MCNC: 0.09 NG/ML
RBC # BLD AUTO: 3.96 M/UL (ref 4.2–5.4)
SODIUM SERPL-SCNC: 137 MMOL/L (ref 135–145)
WBC # BLD AUTO: 3.8 K/UL (ref 4.8–10.8)

## 2024-01-30 PROCEDURE — A9270 NON-COVERED ITEM OR SERVICE: HCPCS | Performed by: OBSTETRICS & GYNECOLOGY

## 2024-01-30 PROCEDURE — 84145 PROCALCITONIN (PCT): CPT

## 2024-01-30 PROCEDURE — 80048 BASIC METABOLIC PNL TOTAL CA: CPT

## 2024-01-30 PROCEDURE — 83735 ASSAY OF MAGNESIUM: CPT

## 2024-01-30 PROCEDURE — 700102 HCHG RX REV CODE 250 W/ 637 OVERRIDE(OP): Performed by: OBSTETRICS & GYNECOLOGY

## 2024-01-30 PROCEDURE — 99232 SBSQ HOSP IP/OBS MODERATE 35: CPT | Performed by: INTERNAL MEDICINE

## 2024-01-30 PROCEDURE — 85025 COMPLETE CBC W/AUTO DIFF WBC: CPT

## 2024-01-30 PROCEDURE — 700111 HCHG RX REV CODE 636 W/ 250 OVERRIDE (IP): Mod: JZ | Performed by: STUDENT IN AN ORGANIZED HEALTH CARE EDUCATION/TRAINING PROGRAM

## 2024-01-30 PROCEDURE — 700105 HCHG RX REV CODE 258

## 2024-01-30 PROCEDURE — 770006 HCHG ROOM/CARE - MED/SURG/GYN SEMI*

## 2024-01-30 RX ADMIN — SODIUM CHLORIDE, POTASSIUM CHLORIDE, SODIUM LACTATE AND CALCIUM CHLORIDE: 600; 310; 30; 20 INJECTION, SOLUTION INTRAVENOUS at 07:50

## 2024-01-30 RX ADMIN — PRENATAL WITH FERROUS FUM AND FOLIC ACID 1 TABLET: 3080; 920; 120; 400; 22; 1.84; 3; 20; 10; 1; 12; 200; 27; 25; 2 TABLET ORAL at 07:49

## 2024-01-30 RX ADMIN — ACETAMINOPHEN 650 MG: 325 TABLET, FILM COATED ORAL at 21:55

## 2024-01-30 RX ADMIN — ACETAMINOPHEN 650 MG: 325 TABLET, FILM COATED ORAL at 09:13

## 2024-01-30 RX ADMIN — SODIUM CHLORIDE, POTASSIUM CHLORIDE, SODIUM LACTATE AND CALCIUM CHLORIDE: 600; 310; 30; 20 INJECTION, SOLUTION INTRAVENOUS at 21:37

## 2024-01-30 RX ADMIN — CEFTRIAXONE SODIUM 1000 MG: 10 INJECTION, POWDER, FOR SOLUTION INTRAVENOUS at 05:33

## 2024-01-30 RX ADMIN — ENOXAPARIN SODIUM 40 MG: 100 INJECTION SUBCUTANEOUS at 16:54

## 2024-01-30 RX ADMIN — ACETAMINOPHEN 650 MG: 325 TABLET, FILM COATED ORAL at 01:58

## 2024-01-30 ASSESSMENT — ENCOUNTER SYMPTOMS
MYALGIAS: 0
CHILLS: 0
SPEECH CHANGE: 0
CONSTIPATION: 0
PHOTOPHOBIA: 0
BLURRED VISION: 0
COUGH: 0
ABDOMINAL PAIN: 0
DIZZINESS: 0
VOMITING: 0
HEMOPTYSIS: 0
WEAKNESS: 0
NAUSEA: 0
PALPITATIONS: 0
DOUBLE VISION: 0
ORTHOPNEA: 0
FEVER: 0
NECK PAIN: 0
CLAUDICATION: 0
WEIGHT LOSS: 0
DIARRHEA: 0

## 2024-01-30 ASSESSMENT — PAIN DESCRIPTION - PAIN TYPE: TYPE: ACUTE PAIN

## 2024-01-30 NOTE — CARE PLAN
The patient is Stable - Low risk of patient condition declining or worsening    Shift Goals  Clinical Goals: Patient will have stable vitals and pain will be controlled  Patient Goals: pain and nausea control  Family Goals: LUCIANO    Progress made toward(s) clinical / shift goals:      Patient VS improving with fluid maintenance    No nausea    Problem: Pain - Standard  Goal: Alleviation of pain or a reduction in pain to the patient’s comfort goal this shift  Outcome: Progressing   PT had headache acetaminophen helps    Patient is not progressing towards the following goals:

## 2024-01-30 NOTE — PROGRESS NOTES
Received report from day shift nurse. Patient is alert and oriented x4.patient denies any pain, nausea, vomiting at this time.  Bed is locked and in the lowest position.     Labor and delivery charge RN contacted regarding Q shift FHT doppler monitoring. Labor and delivery came to the floor to perform monitoring.

## 2024-01-30 NOTE — PROGRESS NOTES
[facility] Medicine Daily Progress Note    Date of Service  1/30/2024    Chief Complaint  Carolina Leong is a 33 y.o. female admitted 1/29/2024 with nausea and vomiting    Hospital Course    33-year-old female pregnant who presented 1/29/2024 chest pain and shortness of breath.  Patient has also had tach with generalized weakness.  With dysuria, nausea and vomiting. OB ultrasound reviewed, intact fetus measuring 22 weeks 5 days approximately.  Urinalysis was consistent with UTI.  Dr. Ruiz was consulted by the ED and had the patient admitted, requested assistance of hospital medicine for starting treatment for pyelonephritis.  Labs did not show leukocytosis, normal kidney function with normal lactic acid, patient was diagnosed with pyelonephritis, ceftriaxone was initiated.    Interval Problem Update  -Evaluated examined the patient at bedside, denied any new symptoms, no fever all chills, some leukopenia, normal kidney function and lactic acid is normal, continue ceftriaxone for another day and possible discharge tomorrow from medical side.  To continue oral antibiotics      I have discussed this patient's plan of care and discharge plan at IDT rounds today with Case Management, Nursing, Nursing leadership, and other members of the IDT team.    Consultants/Specialty  obstetrics    Code Status  Full Code    Disposition  The patient is not medically cleared for discharge to home or a post-acute facility.  Anticipate discharge to: home with close outpatient follow-up    I have placed the appropriate orders for post-discharge needs.    Review of Systems  Review of Systems   Constitutional:  Negative for chills, fever and weight loss.   HENT:  Negative for ear pain, hearing loss and tinnitus.    Eyes:  Negative for blurred vision, double vision and photophobia.   Respiratory:  Negative for cough and hemoptysis.    Cardiovascular:  Negative for chest pain, palpitations, orthopnea and claudication.    Gastrointestinal:  Negative for abdominal pain, constipation, diarrhea, nausea and vomiting.   Genitourinary:  Negative for dysuria, frequency and urgency.   Musculoskeletal:  Negative for myalgias and neck pain.   Skin:  Negative for rash.   Neurological:  Negative for dizziness, speech change and weakness.        Physical Exam  Temp:  [36.4 °C (97.5 °F)-36.7 °C (98 °F)] 36.5 °C (97.7 °F)  Pulse:  [83-90] 83  Resp:  [18] 18  BP: ()/(59-64) 98/64  SpO2:  [94 %-100 %] 100 %    Physical Exam  Constitutional:       General: She is not in acute distress.     Appearance: She is obese. She is not ill-appearing.   HENT:      Head: Normocephalic and atraumatic.   Eyes:      General: No scleral icterus.  Cardiovascular:      Rate and Rhythm: Normal rate.      Heart sounds: No murmur heard.  Pulmonary:      Effort: No respiratory distress.      Breath sounds: No wheezing or rales.   Abdominal:      General: There is no distension.      Tenderness: There is no guarding.   Musculoskeletal:         General: No deformity or signs of injury.      Right lower leg: No edema.      Left lower leg: No edema.   Skin:     Findings: No bruising, lesion or rash.   Neurological:      General: No focal deficit present.      Mental Status: She is oriented to person, place, and time. Mental status is at baseline.         Fluids  No intake or output data in the 24 hours ending 01/30/24 1514    Laboratory  Recent Labs     01/29/24  0102 01/29/24  0912 01/30/24  0725   WBC 8.8 6.2 3.8*   RBC 4.63 4.10* 3.96*   HEMOGLOBIN 12.9 11.4* 11.1*   HEMATOCRIT 37.7 33.8* 33.0*   MCV 81.4 82.4 83.3   MCH 27.9 27.8 28.0   MCHC 34.2 33.7 33.6   RDW 39.7 40.7 43.0   PLATELETCT 200 203 178   MPV 10.4 10.0 10.8     Recent Labs     01/29/24  0102 01/30/24  0725   SODIUM 134* 137   POTASSIUM 3.5* 3.6   CHLORIDE 102 107   CO2 16* 19*   GLUCOSE 101* 78   BUN 6* 6*   CREATININE 0.32* 0.25*   CALCIUM 8.9 7.9*                   Imaging  US-OB 2ND 3RD TRI  COMPLETE   Final Result      1.  Single intrauterine pregnancy of an estimated gestational age of 23 weeks, 1 days with an estimated date of delivery of 05/26/2024.   2.  3 vessel view was not seen.   3.  Fetal survey otherwise within normal limits.      INTERPRETING LOCATION: [address] [facility], [place], [address]      US-OB LIMITED TRANSABDOMINAL   Final Result      Single intrauterine pregnancy of an estimated gestational age of 22 weeks, 5 days with an estimated date of delivery of 05/29/2024.      Complete fetal survey was not performed.      DX-CHEST-PORTABLE (1 VIEW)   Final Result      Hypoinflation without other evidence for acute cardiopulmonary disease.           Assessment/Plan  * UTI (urinary tract infection)- (present on admission)  Assessment & Plan  Came with nausea and vomiting with dysuria  UA showed white blood cell, patient is pregnant  Continue ceftriaxone  Waiting for urine and blood culture    22 weeks gestation of pregnancy  Assessment & Plan  OB/GYN board  No hypertension  Continue IV antibiotics for UTI    Headache- (present on admission)  Assessment & Plan  Improved since presentation, Tylenol as needed  Due to infection    Hypokalemia- (present on admission)  Assessment & Plan  Resolved  Labs daily    Overweight (BMI 25.0-29.9)- (present on admission)  Assessment & Plan  Encouraged the patient to lose weight    Tachycardia- (present on admission)  Assessment & Plan  Improved due to SIRS and UTI           VTE prophylaxis:   SCDs/TEDs   enoxaparin ppx      I have performed a physical exam and reviewed and updated ROS and Plan today (1/30/2024). In review of yesterday's note (1/29/2024), there are no changes except as documented above.    Greater than 51 minutes spent prepping to see patient (e.g. review of tests) obtaining and/or reviewing separately obtained history. Performing a medically appropriate examination and/ evaluation.  Counseling and educating the patient/family/caregiver.  Ordering  medications, tests, or procedures.  Referring and communicating with other health care professionals.  Documenting clinical information in EPIC.  Independently interpreting results and communicating results to patient/family/caregiver.  Care coordination

## 2024-01-30 NOTE — PROGRESS NOTES
Questions asked of patient using . Patient currently denies pain. Will try to get her an early meal tray

## 2024-01-30 NOTE — PROGRESS NOTES
FHT dopplered for approx a minute at 135 baseline. Mother's pulse 94 bpm. Pt denies ctx, leaking, and bleeding. Endorses + FM. States she's overall feeling better. Encouraged to notify her RN for any pregnancy concerns- agrees.

## 2024-01-30 NOTE — CARE PLAN
The patient is Stable - Low risk of patient condition declining or worsening    Shift Goals  Clinical Goals: pt will remain hemodinamically stable during the shift  Patient Goals: rest  Family Goals: LUCIANO    Progress made toward(s) clinical / shift goals:  patient's MAP remain above 70 during the shift.     Patient is not progressing towards the following goals:

## 2024-01-31 ENCOUNTER — PATIENT OUTREACH (OUTPATIENT)
Dept: SCHEDULING | Facility: IMAGING CENTER | Age: 34
End: 2024-01-31
Payer: MEDICAID

## 2024-01-31 ENCOUNTER — PHARMACY VISIT (OUTPATIENT)
Dept: PHARMACY | Facility: MEDICAL CENTER | Age: 34
End: 2024-01-31
Payer: COMMERCIAL

## 2024-01-31 VITALS
TEMPERATURE: 97.5 F | HEART RATE: 95 BPM | SYSTOLIC BLOOD PRESSURE: 106 MMHG | HEIGHT: 63 IN | BODY MASS INDEX: 30.98 KG/M2 | DIASTOLIC BLOOD PRESSURE: 68 MMHG | WEIGHT: 174.82 LBS | OXYGEN SATURATION: 100 % | RESPIRATION RATE: 18 BRPM

## 2024-01-31 LAB
ALBUMIN SERPL BCP-MCNC: 3.3 G/DL (ref 3.2–4.9)
ALBUMIN/GLOB SERPL: 1.1 G/DL
ALP SERPL-CCNC: 81 U/L (ref 30–99)
ALT SERPL-CCNC: 13 U/L (ref 2–50)
ANION GAP SERPL CALC-SCNC: 9 MMOL/L (ref 7–16)
AST SERPL-CCNC: 15 U/L (ref 12–45)
BACTERIA UR CULT: NORMAL
BASOPHILS # BLD AUTO: 0.3 % (ref 0–1.8)
BASOPHILS # BLD: 0.01 K/UL (ref 0–0.12)
BILIRUB SERPL-MCNC: 0.2 MG/DL (ref 0.1–1.5)
BUN SERPL-MCNC: 7 MG/DL (ref 8–22)
CALCIUM ALBUM COR SERPL-MCNC: 8.8 MG/DL (ref 8.5–10.5)
CALCIUM SERPL-MCNC: 8.2 MG/DL (ref 8.5–10.5)
CHLORIDE SERPL-SCNC: 106 MMOL/L (ref 96–112)
CO2 SERPL-SCNC: 22 MMOL/L (ref 20–33)
CREAT SERPL-MCNC: 0.43 MG/DL (ref 0.5–1.4)
CRP SERPL HS-MCNC: 3.75 MG/DL (ref 0–0.75)
EOSINOPHIL # BLD AUTO: 0.01 K/UL (ref 0–0.51)
EOSINOPHIL NFR BLD: 0.3 % (ref 0–6.9)
ERYTHROCYTE [DISTWIDTH] IN BLOOD BY AUTOMATED COUNT: 42.8 FL (ref 35.9–50)
GFR SERPLBLD CREATININE-BSD FMLA CKD-EPI: 131 ML/MIN/1.73 M 2
GLOBULIN SER CALC-MCNC: 3 G/DL (ref 1.9–3.5)
GLUCOSE SERPL-MCNC: 81 MG/DL (ref 65–99)
HCT VFR BLD AUTO: 34.4 % (ref 37–47)
HGB BLD-MCNC: 11.5 G/DL (ref 12–16)
IMM GRANULOCYTES # BLD AUTO: 0.01 K/UL (ref 0–0.11)
IMM GRANULOCYTES NFR BLD AUTO: 0.3 % (ref 0–0.9)
LYMPHOCYTES # BLD AUTO: 0.68 K/UL (ref 1–4.8)
LYMPHOCYTES NFR BLD: 19.5 % (ref 22–41)
MAGNESIUM SERPL-MCNC: 1.7 MG/DL (ref 1.5–2.5)
MCH RBC QN AUTO: 27.9 PG (ref 27–33)
MCHC RBC AUTO-ENTMCNC: 33.4 G/DL (ref 32.2–35.5)
MCV RBC AUTO: 83.5 FL (ref 81.4–97.8)
MONOCYTES # BLD AUTO: 0.39 K/UL (ref 0–0.85)
MONOCYTES NFR BLD AUTO: 11.2 % (ref 0–13.4)
NEUTROPHILS # BLD AUTO: 2.39 K/UL (ref 1.82–7.42)
NEUTROPHILS NFR BLD: 68.4 % (ref 44–72)
NRBC # BLD AUTO: 0 K/UL
NRBC BLD-RTO: 0 /100 WBC (ref 0–0.2)
PLATELET # BLD AUTO: 173 K/UL (ref 164–446)
PMV BLD AUTO: 10.5 FL (ref 9–12.9)
POTASSIUM SERPL-SCNC: 3.7 MMOL/L (ref 3.6–5.5)
PROT SERPL-MCNC: 6.3 G/DL (ref 6–8.2)
RBC # BLD AUTO: 4.12 M/UL (ref 4.2–5.4)
SIGNIFICANT IND 70042: NORMAL
SITE SITE: NORMAL
SODIUM SERPL-SCNC: 137 MMOL/L (ref 135–145)
SOURCE SOURCE: NORMAL
WBC # BLD AUTO: 3.5 K/UL (ref 4.8–10.8)

## 2024-01-31 PROCEDURE — 86140 C-REACTIVE PROTEIN: CPT

## 2024-01-31 PROCEDURE — 83735 ASSAY OF MAGNESIUM: CPT

## 2024-01-31 PROCEDURE — 85025 COMPLETE CBC W/AUTO DIFF WBC: CPT

## 2024-01-31 PROCEDURE — 700102 HCHG RX REV CODE 250 W/ 637 OVERRIDE(OP): Performed by: OBSTETRICS & GYNECOLOGY

## 2024-01-31 PROCEDURE — 99239 HOSP IP/OBS DSCHRG MGMT >30: CPT | Performed by: INTERNAL MEDICINE

## 2024-01-31 PROCEDURE — 80053 COMPREHEN METABOLIC PANEL: CPT

## 2024-01-31 PROCEDURE — A9270 NON-COVERED ITEM OR SERVICE: HCPCS | Performed by: OBSTETRICS & GYNECOLOGY

## 2024-01-31 PROCEDURE — RXMED WILLOW AMBULATORY MEDICATION CHARGE: Performed by: INTERNAL MEDICINE

## 2024-01-31 PROCEDURE — 700111 HCHG RX REV CODE 636 W/ 250 OVERRIDE (IP): Performed by: STUDENT IN AN ORGANIZED HEALTH CARE EDUCATION/TRAINING PROGRAM

## 2024-01-31 PROCEDURE — A9270 NON-COVERED ITEM OR SERVICE: HCPCS

## 2024-01-31 PROCEDURE — 700102 HCHG RX REV CODE 250 W/ 637 OVERRIDE(OP)

## 2024-01-31 RX ORDER — DIPHENHYDRAMINE HCL 25 MG
25 TABLET ORAL ONCE
Status: COMPLETED | OUTPATIENT
Start: 2024-01-31 | End: 2024-01-31

## 2024-01-31 RX ORDER — CEPHALEXIN 500 MG/1
500 CAPSULE ORAL EVERY 6 HOURS
Status: DISCONTINUED | OUTPATIENT
Start: 2024-02-01 | End: 2024-01-31 | Stop reason: HOSPADM

## 2024-01-31 RX ORDER — CEPHALEXIN 500 MG/1
500 CAPSULE ORAL EVERY 6 HOURS
Qty: 28 CAPSULE | Refills: 0 | Status: ACTIVE | OUTPATIENT
Start: 2024-01-31 | End: 2024-02-07

## 2024-01-31 RX ORDER — VITAMIN A ACETATE, BETA CAROTENE, ASCORBIC ACID, CHOLECALCIFEROL, .ALPHA.-TOCOPHEROL ACETATE, DL-, THIAMINE MONONITRATE, RIBOFLAVIN, NIACINAMIDE, PYRIDOXINE HYDROCHLORIDE, FOLIC ACID, CYANOCOBALAMIN, CALCIUM CARBONATE, FERROUS FUMARATE, ZINC OXIDE, CUPRIC OXIDE 3080; 12; 120; 400; 1; 1.84; 3; 20; 22; 920; 25; 200; 27; 10; 2 [IU]/1; UG/1; MG/1; [IU]/1; MG/1; MG/1; MG/1; MG/1; MG/1; [IU]/1; MG/1; MG/1; MG/1; MG/1; MG/1
1 TABLET, FILM COATED ORAL DAILY
Qty: 90 TABLET | Refills: 3 | Status: SHIPPED | OUTPATIENT
Start: 2024-01-31

## 2024-01-31 RX ADMIN — DIPHENHYDRAMINE HYDROCHLORIDE 25 MG: 25 TABLET ORAL at 01:18

## 2024-01-31 RX ADMIN — CEFTRIAXONE SODIUM 1000 MG: 10 INJECTION, POWDER, FOR SOLUTION INTRAVENOUS at 04:10

## 2024-01-31 RX ADMIN — PRENATAL WITH FERROUS FUM AND FOLIC ACID 1 TABLET: 3080; 920; 120; 400; 22; 1.84; 3; 20; 10; 1; 12; 200; 27; 25; 2 TABLET ORAL at 08:11

## 2024-01-31 ASSESSMENT — PAIN DESCRIPTION - PAIN TYPE: TYPE: ACUTE PAIN

## 2024-01-31 NOTE — CARE PLAN
Problem: Pain - Standard  Goal: Alleviation of pain or a reduction in pain to the patient’s comfort goal  Outcome: Progressing     Problem: Bowel Elimination  Goal: Establish and maintain regular bowel function  Outcome: Progressing     Problem: Urinary Elimination  Goal: Establish and maintain regular urinary output  Outcome: Progressing   The patient is Stable - Low risk of patient condition declining or worsening    Shift Goals  Clinical Goals: pt will remain hemodinamically stable tduring the shift  Patient Goals: rest  Family Goals: LUCIANO    Progress made toward(s) clinical / shift goals:    Pt had good urine output throughout this RN's shift. Pt pain was well controlled. Pt got fluids throughout the day. Per L&D doppler was done for baby    Patient is not progressing towards the following goals:

## 2024-01-31 NOTE — DISCHARGE SUMMARY
Discharge Summary    CHIEF COMPLAINT ON ADMISSION  Chief Complaint   Patient presents with    Generalized Body Aches     X 1 day    Headache     X 1 day    Chest Pain     C/o mid sternal chest pain started today. Described as pressure off and on. + SOB. Patient also states that she is 5 months pregnant with no prenatal care.        Reason for Admission  UTI during pregnancy    Admission Date  2024    CODE STATUS  Full Code    HPI & HOSPITAL COURSE    33-year-old female pregnant who presented 2024 chest pain and shortness of breath.  Patient has also had tach with generalized weakness.  With dysuria, nausea and vomiting. OB ultrasound reviewed, intact fetus measuring 22 weeks 5 days approximately.  Urinalysis was consistent with UTI.  Dr. Ruiz was consulted by the ED and had the patient admitted, requested assistance of hospital medicine for starting treatment for pyelonephritis.  Labs did not show leukocytosis, normal kidney function with normal lactic acid, patient was diagnosed with pyelonephritis, ceftriaxone was initiated.  With improving on her symptoms and no more leukocytosis and no fever, switch to oral antibiotics on , blood culture was negative, patient will be discharged in stable situation to continue cefdinir for another 7 days and close monitoring with her OB/GYN as outpatient to repeat urine analysis and urine culture.    On the day of discharge using , long discussion was done with the patient about her situation, discussed the importance of taking her medication, close monitoring as outpatient for repeat labs to make sure resolving all her infection, patient has appointment with her possible PCP/OB/GYN in Friday, patient understood and agreed.    Therefore, she is discharged in good and stable condition to home with close outpatient follow-up.    The patient met 2-midnight criteria for an inpatient stay at the time of discharge.    Discharge  Date  01/31/24      FOLLOW UP ITEMS POST DISCHARGE  Follow-up with PCP and OB/GYN to repeat UA and urine culture    DISCHARGE DIAGNOSES  Principal Problem:    UTI (urinary tract infection) (POA: Yes)  Active Problems:    22 weeks gestation of pregnancy (POA: Unknown)    Overweight (BMI 25.0-29.9) (POA: Yes)    Hypokalemia (POA: Yes)    Headache (POA: Yes)    Pyelonephritis (POA: Yes)    Tachycardia (POA: Yes)  Resolved Problems:    * No resolved hospital problems. *      FOLLOW UP  [facility]  [address] [place] [id number]  [phone]    Follow up in 1 week(s)        MEDICATIONS ON DISCHARGE     Medication List        START taking these medications        Instructions   cephALEXin 500 MG Caps  Commonly known as: Keflex   Take 1 Capsule by mouth every 6 hours for 7 days.  Dose: 500 mg     prenatal plus vitamin 27-1 MG Tabs tablet   Take 1 Tablet by mouth every day.  Dose: 1 Tablet            CONTINUE taking these medications        Instructions   acetaminophen 325 MG Tabs  Commonly known as: Tylenol   Take 325 mg by mouth every 6 hours as needed (headache).  Dose: 325 mg              Allergies  No Known Allergies    DIET  Orders Placed This Encounter   Procedures    Diet Order Diet: Regular     Standing Status:   Standing     Number of Occurrences:   1     Order Specific Question:   Diet:     Answer:   Regular [1]       ACTIVITY  As tolerated.  Weight bearing as tolerated    CONSULTATIONS  OB/GYN    PROCEDURES  None    LABORATORY  Lab Results   Component Value Date    SODIUM 137 01/31/2024    POTASSIUM 3.7 01/31/2024    CHLORIDE 106 01/31/2024    CO2 22 01/31/2024    GLUCOSE 81 01/31/2024    BUN 7 (L) 01/31/2024    CREATININE 0.43 (L) 01/31/2024        Lab Results   Component Value Date    WBC 3.5 (L) 01/31/2024    HEMOGLOBIN 11.5 (L) 01/31/2024    HEMATOCRIT 34.4 (L) 01/31/2024    PLATELETCT 173 01/31/2024        Total time of the discharge process exceeds 35 minutes.

## 2024-01-31 NOTE — CARE PLAN
The patient is Stable - Low risk of patient condition declining or worsening    Shift Goals  Clinical Goals: rest  Patient Goals: rest  Family Goals: LUCIANO    Progress made toward(s) clinical / shift goals:      Patient nwill remain free of pain     Patient is not progressing towards the following goals:

## 2024-01-31 NOTE — PROGRESS NOTES
0720- FHTs 136 by doppler, maternal pulse 72. Pt reports fetal movement, denies vaginal bleeding, leaking of fluid and contractions/cramping. Pt also denies any questions/needs from OB team.

## 2024-01-31 NOTE — DISCHARGE INSTRUCTIONS
Pyelonephritis, Adult    Pyelonephritis is an infection that occurs in the kidney. The kidneys are organs that help clean the blood by moving waste out of the blood and into the pee (urine). This infection can happen quickly, or it can last for a long time. In most cases, it clears up with treatment and does not cause other problems.  What are the causes?  This condition may be caused by:  Germs (bacteria) going from the bladder up to the kidney. This may happen after having a bladder infection.  Germs going from the blood to the kidney.  What increases the risk?  This condition is more likely to develop in:  Pregnant women.  Older people.  People who have any of these conditions:  Diabetes.  Inflammation of the prostate gland (prostatitis), in males.  Kidney stones or bladder stones.  Other problems with the kidney or the parts of your body that carry pee from the kidneys to the bladder (ureters).  Cancer.  People who have a small, thin tube (catheter) placed in the bladder.  People who are sexually active.  Women who use a medicine that kills sperm (spermicide) to prevent pregnancy.  People who have had a prior urinary tract infection (UTI).  What are the signs or symptoms?  Symptoms of this condition include:  Peeing often.  A strong urge to pee right away.  Burning or stinging when peeing.  Belly pain.  Back pain.  Pain in the side (flank area).  Fever or chills.  Blood in the pee, or dark pee.  Feeling sick to your stomach (nauseous) or throwing up (vomiting).  How is this treated?  This condition may be treated by:  Taking antibiotic medicines by mouth (orally).  Drinking enough fluids.  If the infection is bad, you may need to stay in the hospital. You may be given antibiotics and fluids that are put directly into a vein through an IV tube.  In some cases, other treatments may be needed.  Follow these instructions at home:  Medicines  Take your antibiotic medicine as told by your doctor. Do not stop taking  the antibiotic even if you start to feel better.  Take over-the-counter and prescription medicines only as told by your doctor.  General instructions    Drink enough fluid to keep your pee pale yellow.  Avoid caffeine, tea, and carbonated drinks.  Pee (urinate) often. Avoid holding in pee for long periods of time.  Pee before and after sex.  After pooping (having a bowel movement), women should wipe from front to back. Use each tissue only once.  Keep all follow-up visits as told by your doctor. This is important.  Contact a doctor if:  You do not feel better after 2 days.  Your symptoms get worse.  You have a fever.  Get help right away if:  You cannot take your medicine or drink fluids as told.  You have chills and shaking.  You throw up.  You have very bad pain in your side or back.  You feel very weak or you pass out (faint).  Summary  Pyelonephritis is an infection that occurs in the kidney.  In most cases, this infection clears up with treatment and does not cause other problems.  Take your antibiotic medicine as told by your doctor. Do not stop taking the antibiotic even if you start to feel better.  Drink enough fluid to keep your pee pale yellow.  This information is not intended to replace advice given to you by your health care provider. Make sure you discuss any questions you have with your health care provider.  Document Revised: 07/28/2022 Document Reviewed: 07/28/2022  [employer] Patient Education © 2023 [employer]

## 2024-01-31 NOTE — PROGRESS NOTES
2000:  FHT dopplered for approx a minute at 140 baseline. Mother's pulse 94 bpm. Pt reports positive fetal movement. Denies UC's.  Call light in reach.

## 2024-01-31 NOTE — PROGRESS NOTES
Patient to be discharged today - patient aware and agreeable to plan. D/c instructions reviewed with patient - ?'s/concerns answered. No piv present, meds to beds handed to patient.

## 2024-02-03 LAB
BACTERIA BLD CULT: NORMAL
BACTERIA BLD CULT: NORMAL
SIGNIFICANT IND 70042: NORMAL
SIGNIFICANT IND 70042: NORMAL
SITE SITE: NORMAL
SITE SITE: NORMAL
SOURCE SOURCE: NORMAL
SOURCE SOURCE: NORMAL

## 2024-05-21 ENCOUNTER — TELEPHONE (OUTPATIENT)
Dept: OBGYN | Facility: CLINIC | Age: 34
End: 2024-05-21

## 2024-05-21 ENCOUNTER — HOSPITAL ENCOUNTER (EMERGENCY)
Facility: MEDICAL CENTER | Age: 34
End: 2024-05-21
Attending: OBSTETRICS & GYNECOLOGY | Admitting: OBSTETRICS & GYNECOLOGY
Payer: MEDICAID

## 2024-05-21 VITALS
RESPIRATION RATE: 16 BRPM | HEART RATE: 99 BPM | DIASTOLIC BLOOD PRESSURE: 80 MMHG | OXYGEN SATURATION: 94 % | BODY MASS INDEX: 33.66 KG/M2 | TEMPERATURE: 97.4 F | SYSTOLIC BLOOD PRESSURE: 121 MMHG | WEIGHT: 190 LBS | HEIGHT: 63 IN

## 2024-05-21 PROBLEM — R51.9 HEADACHE: Status: RESOLVED | Noted: 2019-12-02 | Resolved: 2024-05-21

## 2024-05-21 PROBLEM — O28.3 ABNORMAL PREGNANCY US: Status: RESOLVED | Noted: 2017-07-06 | Resolved: 2024-05-21

## 2024-05-21 PROBLEM — E87.6 HYPOKALEMIA: Status: RESOLVED | Noted: 2019-12-02 | Resolved: 2024-05-21

## 2024-05-21 PROBLEM — Z34.83 ENCOUNTER FOR SUPERVISION OF OTHER NORMAL PREGNANCY IN THIRD TRIMESTER: Status: RESOLVED | Noted: 2017-07-06 | Resolved: 2024-05-21

## 2024-05-21 PROBLEM — N76.0 BV (BACTERIAL VAGINOSIS): Status: RESOLVED | Noted: 2017-07-06 | Resolved: 2024-05-21

## 2024-05-21 PROBLEM — A41.9 SEPSIS (HCC): Status: RESOLVED | Noted: 2019-12-02 | Resolved: 2024-05-21

## 2024-05-21 PROBLEM — Z91.199 NON-COMPLIANT PATIENT: Status: RESOLVED | Noted: 2017-09-22 | Resolved: 2024-05-21

## 2024-05-21 PROBLEM — B96.89 BV (BACTERIAL VAGINOSIS): Status: RESOLVED | Noted: 2017-07-06 | Resolved: 2024-05-21

## 2024-05-21 PROBLEM — R73.02 GLUCOSE INTOLERANCE (IMPAIRED GLUCOSE TOLERANCE): Status: RESOLVED | Noted: 2017-07-19 | Resolved: 2024-05-21

## 2024-05-21 PROBLEM — E66.3 OVERWEIGHT (BMI 25.0-29.9): Status: RESOLVED | Noted: 2019-12-02 | Resolved: 2024-05-21

## 2024-05-21 PROBLEM — Z3A.22 22 WEEKS GESTATION OF PREGNANCY: Status: RESOLVED | Noted: 2024-01-30 | Resolved: 2024-05-21

## 2024-05-21 PROBLEM — R73.09 ABNORMAL GTT (GLUCOSE TOLERANCE TEST): Status: RESOLVED | Noted: 2017-07-06 | Resolved: 2024-05-21

## 2024-05-21 PROBLEM — R00.0 TACHYCARDIA: Status: RESOLVED | Noted: 2019-12-02 | Resolved: 2024-05-21

## 2024-05-21 LAB
ERYTHROCYTE [DISTWIDTH] IN BLOOD BY AUTOMATED COUNT: 44.2 FL (ref 35.9–50)
GP B STREP DNA SPEC QL NAA+PROBE: NEGATIVE
HCT VFR BLD AUTO: 37.5 % (ref 37–47)
HCV AB SER QL: NORMAL
HGB BLD-MCNC: 12.8 G/DL (ref 12–16)
MCH RBC QN AUTO: 28.1 PG (ref 27–33)
MCHC RBC AUTO-ENTMCNC: 34.1 G/DL (ref 32.2–35.5)
MCV RBC AUTO: 82.2 FL (ref 81.4–97.8)
PLATELET # BLD AUTO: 187 K/UL (ref 164–446)
PMV BLD AUTO: 11.1 FL (ref 9–12.9)
RBC # BLD AUTO: 4.56 M/UL (ref 4.2–5.4)
T PALLIDUM AB SER QL IA: NORMAL
WBC # BLD AUTO: 6.8 K/UL (ref 4.8–10.8)

## 2024-05-21 PROCEDURE — 87081 CULTURE SCREEN ONLY: CPT

## 2024-05-21 PROCEDURE — 86803 HEPATITIS C AB TEST: CPT

## 2024-05-21 PROCEDURE — 36415 COLL VENOUS BLD VENIPUNCTURE: CPT

## 2024-05-21 PROCEDURE — 99283 EMERGENCY DEPT VISIT LOW MDM: CPT | Performed by: FAMILY MEDICINE

## 2024-05-21 PROCEDURE — 86780 TREPONEMA PALLIDUM: CPT

## 2024-05-21 PROCEDURE — 87653 STREP B DNA AMP PROBE: CPT | Mod: XU

## 2024-05-21 PROCEDURE — 99284 EMERGENCY DEPT VISIT MOD MDM: CPT

## 2024-05-21 PROCEDURE — 87150 DNA/RNA AMPLIFIED PROBE: CPT

## 2024-05-21 PROCEDURE — 85027 COMPLETE CBC AUTOMATED: CPT

## 2024-05-21 PROCEDURE — 59025 FETAL NON-STRESS TEST: CPT

## 2024-05-21 ASSESSMENT — FIBROSIS 4 INDEX: FIB4 SCORE: 0.82

## 2024-05-21 ASSESSMENT — PAIN SCALES - GENERAL: PAINLEVEL: 0 - NO PAIN

## 2024-05-21 NOTE — LETTER
05/23/24          Carolina Odom por favor tenemos neela informacion pendiente con respecto a arroyo cuidado medico.  Neela de las enfermeras está disponible para hablar con usted de Lunes a Viernes de 8 a.m. - 4:30 p.m.    Cristian por favor al [phone]; y leila por arroyo pronta atención.          Sinceramente,      Diane Mills R.N.    Firmado Electrónicamente

## 2024-05-21 NOTE — PROGRESS NOTES
0844: Pt is a  at 40.1 weeks today, pt presents to L&D with c/o decreased fetal movement today and because her due date was yesterday and she has not gone into labor. Pt denies VB/LOF/Uc's at this time. EFM and TOCO applied, VS taken, pt comfortable in bed at this time.  used at this time (c-LEcta [id number])     0915: Dr Cobb at bedside, POC discussed, FHT reviewed. Orders received, see orders for details.     0940: Pt reporting normal +FM at this time.  used at this time (c-LEcta [id number])    1020: This RN updated Dr Cobb on pt status/FHT reviewed. Discharge orders received, see orders for details.     1040:  Discharge instructions given including term labor precautions, UC's, ROM, VB, abn FM, when to return to L&D, how to call and schedule an appt to be seen with [facility]. Questions answered at length. Pt verbalized understanding and agreement with POC and discharge. Discharge instructions signed.  used at this time (Stella [id number])

## 2024-05-21 NOTE — DISCHARGE INSTRUCTIONS
Labor Instructions (37 - 39 weeks):  Call your physician or return to hospital if:  You have regular contractions that get progressively closer, longer and stronger.  Your water breaks (remember time and color).  You have bleeding like a period.  Your baby does not move enough to complete the daily kick counts (10 movements in 2 hours)  Your baby moves much less often than on the days before or you have not felt your baby move all day.  LLAME A ARROYO MÉDICO/A INMEDIATAMENTE SI PRESENTA LOS SIGUIENTES SÍNTOMAS:     Dolor al orinar.   Sangrado vaginal, chio el que ocurre terell periodos menstruales. Es posible que tenga neela pequeña cantidad de danuta en el papel de baño después de orinar si se ha hecho un examen vaginal recientemente o lisa ha sostenido relaciones sexuales.   Goteo de agua o escurrimiento rápido de agua de la vagina.  Hinchazón repentina de la yancy o de las cheli.   Dolor de john severo y permanente que no se yecenia a pesar de betty Tylenol.   Cólicos chio los que ocurren terell el periodo menstrual cada mando minutos o más frecuentes.   Si no siente los movimientos de arroyo bebé tanto chio los siente normalmente o según las instrucciones particulares que le pina dado.     SI COMIENZA EL PARTO PREMATURO:    Coma cosas ligeras. Recuerde que todo lo que ingiera podría volver a salir de arroyo cuerpo terell el parto.   Lisa las actividades físicas que pueda tolerar.   Llame a arroyo médico/a o regrese al hospital si las contracciones se presentan cada frederic a mando minutos y isbell por lo menos 60 segundos terell neela hora o más o lisa si se siente demasiado incómoda chio para permanecer en casa.     TOME EN CUENTA LO SIGUIENTE:    [place] muchos líquidos (por lo menos entre 2 y 3 cuartos de galón al día).  Descanse lo más que pueda. Hágalo acostada ya sea del lado derecho o del rickey.   Lleve neela dienta balanceada y nutritiva.  Acuda a la próxima consulta que ha programado con arroyo médico/a.    Acuda a arroyo prueba de  no estrés (cardiotocografía). Procure comer algo ligero antes de hacerse la prueba.         [facility]  Phone Line   Línea de interpretación telef[facility]ica de [facility]     If for any reason you need to call [facility], you can now call with an  on the line with you.   Si tiene que llamar a [facility], ahora tiene la posibilidad de hacerlo con la asistencia de neela intérprete presente en la línea con usted.    First call [phone] to connect with an  free of charge. Next, tell the  your preferred [facility] department. They will connect you to the department and remain on the line to interpret.   Leida llame al [phone] para conectarse con un/a intérprete sin costo alguno. Dígale al o a la intérprete con qué departamento de [facility] desea hablar. El/la intérprete conectará la llamada y le interpretará.

## 2024-05-21 NOTE — ED PROVIDER NOTES
LABOR & DELIVERY TRIAGE HISTORY AND PHYSICAL  Patient Name: Carolina Leong Age/Sex: 34 y.o. female  : 1990 MRN: [id number]      HISTORY OF PRESENT ILLNESS:   Carolina Leong is a 34 y.o.  at  39 weeks 2 days gestation by 23w1d US who is here for post-dates evaluation. Patient states she believed she was 40w1d gestational age today and wanted to be evaluated in triage. She has not had prenatal care during this pregnancy with the exception of hospital admission -24 for pyelonephritis at which time initial prenatal labs and fetal US ordered.     Fetal movement: Present  Leakage of Fluid: Denies  Vaginal Bleeding: Denies  Contractions: Denies    She has not received prenatal care this pregnancy with the exception of prenatal labs and US ordered during hospitalization -24 for pyelonephritis, treated with ceftriaxone.   Complications during pregnancy: None    History of STD: Denies     OBSTETRICAL HISTORY:  OB History    Para Term  AB Living   6 4 4   1 3   SAB IAB Ectopic Molar Multiple Live Births   1         3      # Outcome Date GA Lbr Vijay/2nd Weight Sex Delivery Anes PTL Lv   6 Current            5 Term 07/30/15 39w0d  3.175 kg (7 lb) M Vag-Spont EPI N JOHN      Birth Comments: Pt states no complications.    4 Term 14 39w0d  3.175 kg (7 lb) M Vag-Spont None Y JOHN      Birth Comments: Pt states no complications   3 Term 12 39w6d  3.515 kg (7 lb 12 oz) M Vag-Spont EPI N JOHN      Birth Comments: Denies complications.   2 SAB  12w0d             Birth Comments: no D&C needed    1 Term      Vag-Spont          MEDICAL HISTORY:  Past Medical History:   Diagnosis Date    BV (bacterial vaginosis) 2017       SURGICAL HISTORY:  No past surgical history on file.    MEDICATIONS:  No medications prior to admission.       ALLERGIES:  No Known Allergies     SOCIAL HISTORY:   Tobacco use: Denies  Alcohol use: Denies  Recreational drug use: Denies   reports  that she has never smoked. She has never used smokeless tobacco. She reports that she does not drink alcohol and does not use drugs.    FAMILY HISTORY:    No family history on file.  Patient reports family history of HTN and CAD in her mother.     REVIEW OF SYSTEMS:  Pertinent items are noted in HPI.      PHYSICAL EXAM:  Temp:  [36.3 °C (97.4 °F)] 36.3 °C (97.4 °F)  Pulse:  [99] 99  Resp:  [16] 16  BP: (121)/(80) 121/80  SpO2:  [94 %] 94 %  Body mass index is 33.66 kg/m².    General:  AAOx3, NAD, calm disposition   CV: RRR, no M/R/G   Pulmonary:  CTAB, normal effort   Abdomen: Soft and non-tender, gravid uterus   FHT: Baseline 130/moderate variability/+accels/-decels   Contractions: None     Prenatal Labs:  HepBSAg NR  Hepatitis C NR  HIV NR  RPR NR  Rubella Immune  ABO O+    Group B Strep: negative    ASSESSMENT/ PLAN:   Carolina Leong is a 34 y.o.  at 39 weeks 2 days gestation by 23w1d US who is here for post-dates evaluation (patient believed she was at 40w1d gestational age)    #SIUP @ 39w2d  - Ordered 3rd trimester labs today including CBC, RPR, and Hepatitis C. All were negative.  - GBS negative  - Sent message to [facility] scheduling to set patient up for follow up appointment in office within 1 week. Also gave patient information for calling to schedule an appointment with [facility] within 1 week.  - 24 fetal US showed fetal survey wnl     Follow-up with [facility] within 1 week of DC.     Sofía Cobb MD   PGY-1 Resident   [facility]

## 2024-05-21 NOTE — TELEPHONE ENCOUNTER
----- Message from Sofía Cobb M.D. sent at 5/21/2024 10:06 AM PDT -----  Regarding: Scheduling Follow-up This Week  Hello,    We saw this patient in L&D Triage today, she is 39w2d gestation and has not had any prenatal care thus far in this pregnancy. Could we schedule her for soonest available follow-up appointment?    Thank you,  Dr. Cobb    Note: appt being held on 5/24/24 at 0945 with Dr Leyva.    5/21/2024 1416   ID: [id number]  Left message for pt to call back to follow up on her L&D visit.    5/22/2024 0948   ID: [id number]  Left message for pt to call back to follow up on her L&D visit.    1117 AMANDA Dorsey called pt and lvm to call back.    5/23/2024   Sent pt letter in Mongolian to call office.

## 2024-05-22 ENCOUNTER — TELEPHONE (OUTPATIENT)
Dept: OBGYN | Facility: CLINIC | Age: 34
End: 2024-05-22
Payer: MEDICAID

## 2024-05-22 LAB — GP B STREP DNA SPEC QL NAA+PROBE: NEGATIVE

## 2024-05-22 NOTE — TELEPHONE ENCOUNTER
Left message for pt to call back. Wanted to inform patient that we can get her scheduled this Friday 05/24/24 as an ER F/U. Called the emergency contact as well.md

## 2024-05-24 ENCOUNTER — ANESTHESIA EVENT (OUTPATIENT)
Dept: ANESTHESIOLOGY | Facility: MEDICAL CENTER | Age: 34
End: 2024-05-24
Payer: MEDICAID

## 2024-05-24 ENCOUNTER — ANESTHESIA (OUTPATIENT)
Dept: ANESTHESIOLOGY | Facility: MEDICAL CENTER | Age: 34
End: 2024-05-24
Payer: MEDICAID

## 2024-05-24 ENCOUNTER — HOSPITAL ENCOUNTER (INPATIENT)
Facility: MEDICAL CENTER | Age: 34
LOS: 2 days | End: 2024-05-26
Attending: OBSTETRICS & GYNECOLOGY | Admitting: OBSTETRICS & GYNECOLOGY
Payer: MEDICAID

## 2024-05-24 LAB
ALBUMIN SERPL BCP-MCNC: 3.5 G/DL (ref 3.2–4.9)
ALBUMIN/GLOB SERPL: 1 G/DL
ALP SERPL-CCNC: 243 U/L (ref 30–99)
ALT SERPL-CCNC: 9 U/L (ref 2–50)
ANION GAP SERPL CALC-SCNC: 14 MMOL/L (ref 7–16)
AST SERPL-CCNC: 17 U/L (ref 12–45)
BASOPHILS # BLD AUTO: 0.2 % (ref 0–1.8)
BASOPHILS # BLD: 0.02 K/UL (ref 0–0.12)
BILIRUB SERPL-MCNC: 0.3 MG/DL (ref 0.1–1.5)
BUN SERPL-MCNC: 8 MG/DL (ref 8–22)
CALCIUM ALBUM COR SERPL-MCNC: 9.3 MG/DL (ref 8.5–10.5)
CALCIUM SERPL-MCNC: 8.9 MG/DL (ref 8.5–10.5)
CHLORIDE SERPL-SCNC: 105 MMOL/L (ref 96–112)
CO2 SERPL-SCNC: 16 MMOL/L (ref 20–33)
CREAT SERPL-MCNC: 0.37 MG/DL (ref 0.5–1.4)
EOSINOPHIL # BLD AUTO: 0.05 K/UL (ref 0–0.51)
EOSINOPHIL NFR BLD: 0.5 % (ref 0–6.9)
ERYTHROCYTE [DISTWIDTH] IN BLOOD BY AUTOMATED COUNT: 43.3 FL (ref 35.9–50)
GFR SERPLBLD CREATININE-BSD FMLA CKD-EPI: 135 ML/MIN/1.73 M 2
GLOBULIN SER CALC-MCNC: 3.5 G/DL (ref 1.9–3.5)
GLUCOSE SERPL-MCNC: 99 MG/DL (ref 65–99)
HCT VFR BLD AUTO: 38.8 % (ref 37–47)
HGB BLD-MCNC: 13.3 G/DL (ref 12–16)
HOLDING TUBE BB 8507: NORMAL
IMM GRANULOCYTES # BLD AUTO: 0.05 K/UL (ref 0–0.11)
IMM GRANULOCYTES NFR BLD AUTO: 0.5 % (ref 0–0.9)
LYMPHOCYTES # BLD AUTO: 1.42 K/UL (ref 1–4.8)
LYMPHOCYTES NFR BLD: 15.1 % (ref 22–41)
MCH RBC QN AUTO: 27.7 PG (ref 27–33)
MCHC RBC AUTO-ENTMCNC: 34.3 G/DL (ref 32.2–35.5)
MCV RBC AUTO: 80.7 FL (ref 81.4–97.8)
MONOCYTES # BLD AUTO: 0.53 K/UL (ref 0–0.85)
MONOCYTES NFR BLD AUTO: 5.6 % (ref 0–13.4)
NEUTROPHILS # BLD AUTO: 7.35 K/UL (ref 1.82–7.42)
NEUTROPHILS NFR BLD: 78.1 % (ref 44–72)
NRBC # BLD AUTO: 0 K/UL
NRBC BLD-RTO: 0 /100 WBC (ref 0–0.2)
PLATELET # BLD AUTO: 199 K/UL (ref 164–446)
PMV BLD AUTO: 11.5 FL (ref 9–12.9)
POTASSIUM SERPL-SCNC: 3.8 MMOL/L (ref 3.6–5.5)
PROT SERPL-MCNC: 7 G/DL (ref 6–8.2)
RBC # BLD AUTO: 4.81 M/UL (ref 4.2–5.4)
SODIUM SERPL-SCNC: 135 MMOL/L (ref 135–145)
T PALLIDUM AB SER QL IA: NORMAL
WBC # BLD AUTO: 9.4 K/UL (ref 4.8–10.8)

## 2024-05-24 PROCEDURE — 59409 OBSTETRICAL CARE: CPT

## 2024-05-24 PROCEDURE — 700111 HCHG RX REV CODE 636 W/ 250 OVERRIDE (IP): Mod: JZ

## 2024-05-24 PROCEDURE — 99999 PR NO CHARGE: CPT | Performed by: OBSTETRICS & GYNECOLOGY

## 2024-05-24 PROCEDURE — 36415 COLL VENOUS BLD VENIPUNCTURE: CPT

## 2024-05-24 PROCEDURE — 85025 COMPLETE CBC W/AUTO DIFF WBC: CPT

## 2024-05-24 PROCEDURE — 700105 HCHG RX REV CODE 258

## 2024-05-24 PROCEDURE — A9270 NON-COVERED ITEM OR SERVICE: HCPCS

## 2024-05-24 PROCEDURE — 770002 HCHG ROOM/CARE - OB PRIVATE (112)

## 2024-05-24 PROCEDURE — 86780 TREPONEMA PALLIDUM: CPT

## 2024-05-24 PROCEDURE — 304965 HCHG RECOVERY SERVICES

## 2024-05-24 PROCEDURE — 80053 COMPREHEN METABOLIC PANEL: CPT

## 2024-05-24 PROCEDURE — 99283 EMERGENCY DEPT VISIT LOW MDM: CPT

## 2024-05-24 PROCEDURE — 59409 OBSTETRICAL CARE: CPT | Performed by: OBSTETRICS & GYNECOLOGY

## 2024-05-24 PROCEDURE — 700102 HCHG RX REV CODE 250 W/ 637 OVERRIDE(OP)

## 2024-05-24 PROCEDURE — 700105 HCHG RX REV CODE 258: Performed by: ANESTHESIOLOGY

## 2024-05-24 PROCEDURE — 10907ZC DRAINAGE OF AMNIOTIC FLUID, THERAPEUTIC FROM PRODUCTS OF CONCEPTION, VIA NATURAL OR ARTIFICIAL OPENING: ICD-10-PCS | Performed by: OBSTETRICS & GYNECOLOGY

## 2024-05-24 PROCEDURE — 303615 HCHG EPIDURAL/SPINAL ANESTHESIA FOR LABOR

## 2024-05-24 RX ORDER — SODIUM CHLORIDE, SODIUM LACTATE, POTASSIUM CHLORIDE, AND CALCIUM CHLORIDE .6; .31; .03; .02 G/100ML; G/100ML; G/100ML; G/100ML
250 INJECTION, SOLUTION INTRAVENOUS PRN
Status: DISCONTINUED | OUTPATIENT
Start: 2024-05-24 | End: 2024-05-24 | Stop reason: HOSPADM

## 2024-05-24 RX ORDER — IBUPROFEN 800 MG/1
800 TABLET ORAL EVERY 8 HOURS PRN
Status: DISCONTINUED | OUTPATIENT
Start: 2024-05-24 | End: 2024-05-26 | Stop reason: HOSPADM

## 2024-05-24 RX ORDER — SODIUM CHLORIDE, SODIUM LACTATE, POTASSIUM CHLORIDE, CALCIUM CHLORIDE 600; 310; 30; 20 MG/100ML; MG/100ML; MG/100ML; MG/100ML
INJECTION, SOLUTION INTRAVENOUS CONTINUOUS
Status: DISCONTINUED | OUTPATIENT
Start: 2024-05-24 | End: 2024-05-26 | Stop reason: HOSPADM

## 2024-05-24 RX ORDER — SIMETHICONE 125 MG
125 TABLET,CHEWABLE ORAL 4 TIMES DAILY PRN
Status: DISCONTINUED | OUTPATIENT
Start: 2024-05-24 | End: 2024-05-26 | Stop reason: HOSPADM

## 2024-05-24 RX ORDER — LIDOCAINE HYDROCHLORIDE 10 MG/ML
20 INJECTION, SOLUTION INFILTRATION; PERINEURAL
Status: DISCONTINUED | OUTPATIENT
Start: 2024-05-24 | End: 2024-05-24 | Stop reason: HOSPADM

## 2024-05-24 RX ORDER — BUPIVACAINE HYDROCHLORIDE 2.5 MG/ML
INJECTION, SOLUTION EPIDURAL; INFILTRATION; INTRACAUDAL PRN
Status: DISCONTINUED | OUTPATIENT
Start: 2024-05-24 | End: 2024-05-24 | Stop reason: SURG

## 2024-05-24 RX ORDER — ACETAMINOPHEN 500 MG
1000 TABLET ORAL EVERY 6 HOURS PRN
Status: DISCONTINUED | OUTPATIENT
Start: 2024-05-24 | End: 2024-05-26 | Stop reason: HOSPADM

## 2024-05-24 RX ORDER — ACETAMINOPHEN 500 MG
1000 TABLET ORAL
Status: DISCONTINUED | OUTPATIENT
Start: 2024-05-24 | End: 2024-05-24 | Stop reason: HOSPADM

## 2024-05-24 RX ORDER — ONDANSETRON 2 MG/ML
4 INJECTION INTRAMUSCULAR; INTRAVENOUS EVERY 6 HOURS PRN
Status: DISCONTINUED | OUTPATIENT
Start: 2024-05-24 | End: 2024-05-24 | Stop reason: HOSPADM

## 2024-05-24 RX ORDER — ROPIVACAINE HYDROCHLORIDE 2 MG/ML
INJECTION, SOLUTION EPIDURAL; INFILTRATION; PERINEURAL CONTINUOUS
Status: DISCONTINUED | OUTPATIENT
Start: 2024-05-24 | End: 2024-05-26 | Stop reason: HOSPADM

## 2024-05-24 RX ORDER — ROPIVACAINE HYDROCHLORIDE 2 MG/ML
INJECTION, SOLUTION EPIDURAL; INFILTRATION; PERINEURAL
Status: COMPLETED
Start: 2024-05-24 | End: 2024-05-24

## 2024-05-24 RX ORDER — OXYTOCIN 10 [USP'U]/ML
10 INJECTION, SOLUTION INTRAMUSCULAR; INTRAVENOUS
Status: DISCONTINUED | OUTPATIENT
Start: 2024-05-24 | End: 2024-05-24 | Stop reason: HOSPADM

## 2024-05-24 RX ORDER — IBUPROFEN 800 MG/1
800 TABLET ORAL
Status: COMPLETED | OUTPATIENT
Start: 2024-05-24 | End: 2024-05-24

## 2024-05-24 RX ORDER — ONDANSETRON 4 MG/1
4 TABLET, ORALLY DISINTEGRATING ORAL EVERY 6 HOURS PRN
Status: DISCONTINUED | OUTPATIENT
Start: 2024-05-24 | End: 2024-05-24 | Stop reason: HOSPADM

## 2024-05-24 RX ORDER — TERBUTALINE SULFATE 1 MG/ML
0.25 INJECTION, SOLUTION SUBCUTANEOUS
Status: DISCONTINUED | OUTPATIENT
Start: 2024-05-24 | End: 2024-05-24 | Stop reason: HOSPADM

## 2024-05-24 RX ORDER — POLYETHYLENE GLYCOL 3350 17 G/17G
1 POWDER, FOR SOLUTION ORAL
Status: DISCONTINUED | OUTPATIENT
Start: 2024-05-24 | End: 2024-05-26 | Stop reason: HOSPADM

## 2024-05-24 RX ORDER — EPHEDRINE SULFATE 50 MG/ML
5 INJECTION, SOLUTION INTRAVENOUS
Status: DISCONTINUED | OUTPATIENT
Start: 2024-05-24 | End: 2024-05-24 | Stop reason: HOSPADM

## 2024-05-24 RX ORDER — SODIUM CHLORIDE, SODIUM LACTATE, POTASSIUM CHLORIDE, CALCIUM CHLORIDE 600; 310; 30; 20 MG/100ML; MG/100ML; MG/100ML; MG/100ML
INJECTION, SOLUTION INTRAVENOUS PRN
Status: DISCONTINUED | OUTPATIENT
Start: 2024-05-24 | End: 2024-05-26 | Stop reason: HOSPADM

## 2024-05-24 RX ORDER — SODIUM CHLORIDE, SODIUM LACTATE, POTASSIUM CHLORIDE, AND CALCIUM CHLORIDE .6; .31; .03; .02 G/100ML; G/100ML; G/100ML; G/100ML
1000 INJECTION, SOLUTION INTRAVENOUS
Status: COMPLETED | OUTPATIENT
Start: 2024-05-24 | End: 2024-05-24

## 2024-05-24 RX ADMIN — IBUPROFEN 800 MG: 800 TABLET, FILM COATED ORAL at 18:54

## 2024-05-24 RX ADMIN — ROPIVACAINE HYDROCHLORIDE 200 MG: 2 INJECTION, SOLUTION EPIDURAL; INFILTRATION at 11:34

## 2024-05-24 RX ADMIN — OXYTOCIN 20 UNITS: 10 INJECTION, SOLUTION INTRAMUSCULAR; INTRAVENOUS at 16:10

## 2024-05-24 RX ADMIN — SODIUM CHLORIDE, POTASSIUM CHLORIDE, SODIUM LACTATE AND CALCIUM CHLORIDE 1000 ML: 600; 310; 30; 20 INJECTION, SOLUTION INTRAVENOUS at 12:27

## 2024-05-24 RX ADMIN — OXYTOCIN 125 ML/HR: 10 INJECTION, SOLUTION INTRAMUSCULAR; INTRAVENOUS at 17:22

## 2024-05-24 RX ADMIN — BUPIVACAINE HYDROCHLORIDE 5 ML: 2.5 INJECTION, SOLUTION EPIDURAL; INFILTRATION; INTRACAUDAL at 11:38

## 2024-05-24 RX ADMIN — SODIUM CHLORIDE, POTASSIUM CHLORIDE, SODIUM LACTATE AND CALCIUM CHLORIDE: 600; 310; 30; 20 INJECTION, SOLUTION INTRAVENOUS at 14:27

## 2024-05-24 RX ADMIN — ROPIVACAINE HYDROCHLORIDE 200 MG: 2 INJECTION, SOLUTION EPIDURAL; INFILTRATION; PERINEURAL at 11:34

## 2024-05-24 ASSESSMENT — LIFESTYLE VARIABLES
ALCOHOL_USE: NO
EVER_SMOKED: NEVER

## 2024-05-24 ASSESSMENT — PATIENT HEALTH QUESTIONNAIRE - PHQ9
1. LITTLE INTEREST OR PLEASURE IN DOING THINGS: NOT AT ALL
SUM OF ALL RESPONSES TO PHQ9 QUESTIONS 1 AND 2: 0
2. FEELING DOWN, DEPRESSED, IRRITABLE, OR HOPELESS: NOT AT ALL
2. FEELING DOWN, DEPRESSED, IRRITABLE, OR HOPELESS: NOT AT ALL
1. LITTLE INTEREST OR PLEASURE IN DOING THINGS: NOT AT ALL
SUM OF ALL RESPONSES TO PHQ9 QUESTIONS 1 AND 2: 0

## 2024-05-24 ASSESSMENT — PAIN DESCRIPTION - PAIN TYPE: TYPE: ACUTE PAIN

## 2024-05-24 ASSESSMENT — PAIN SCALES - GENERAL: PAIN_LEVEL: 0

## 2024-05-24 ASSESSMENT — FIBROSIS 4 INDEX: FIB4 SCORE: 0.76

## 2024-05-24 NOTE — PROGRESS NOTES
"Labor Progress Note:    S:  Pt reports that she is feeling much more comfortable with epidural in place. Is agreeable to ROM at this time.     Patient Vitals for the past 24 hrs:   BP Temp Temp src Pulse Resp SpO2 Height Weight   24 0840 131/88 -- -- (!) 102 -- -- -- --   24 0820 (!) 120/91 -- -- 98 -- -- -- --   24 0810 123/89 -- -- (!) 102 -- -- -- --   24 0800 133/89 -- -- 88 -- -- -- --   24 0730 (!) 133/98 -- -- 86 -- 98 % -- --   24 0727 (!) 133/98 36.4 °C (97.5 °F) Temporal 86 18 99 % 1.6 m (5' 3\") 86.2 kg (190 lb)     Gen: AAO, NAD    SVE: 5cm/80%/-2 @ 13:00 per Dr. Donis, with AROM with clear fluids    FHT: baseline 135/moderate variability/+accels/- decels  toco: q3-4min ctx      A/P: 34 y.o.  @ 39w5d by 23w1d US admitted for labor  - labor: Patient in early labor, now with epidural in place and AROM with clear fluids. Anticipate . Consider pitocin for augmentation if no change made at next check.   - PIH workup: CBC and CMP wnl, pending P/C ratio.  Continue to monitor BP, has been between 120-131 systolic / 88-91 diastolic since admission to L&D.   - FHT: cat 1  - GBS: negative  - pain: well-controlled with epidural    Sofía Cobb M.D.  PGY-1  [facility]  "

## 2024-05-24 NOTE — L&D DELIVERY NOTE
Vaginal Delivery Procedure Note:    Carolina Leong is a 34 y.o. , admitted for active labor.  Her labor course was overall uncomplicated. Patient received epidural, AROM with clear fluid at 13:10, and then progressed to  without augmentation.     PreDelivery Diagnosis:  1. SIUP @ 39w5d  2. Lack of prenatal care    PostDelivery Diagnosis:  1. S/p   2. Lack of prenatal care    Procedure in Detail:  Pt pushing dorsal lithotomy position.  Head delivered easily and restituted towards maternal AIME.  Anterior shoulder followed easily with gentle downwards pressure followed by the posterior shoulder and body.  female infant delivered @ 15:56.  There was no nuchal cord.  Infant was placed on the maternal abdomen and was stimulated and bulb suctioned.  Cord clamping was delayed x60 seconds then the cord was clamped x2 and cut.  Infant APGARs 8 and 9 and 1 and 5 minutes, respectively.  Birth weight pending.  Cord gases were not sent.  IV pitocin bolus started.  Placenta delivered spontaneously intact at 16:02. 3 Vessel cord.  The vagina and perineum were examined revealing no tears.  The uterus was firm with IV pitocin and fundal massage.  Pt and infant left bonding in LDR.    EBL 50cc  Anesthesia - Epidural  Sponge and needle counts correct.  Patient tolerated procedure well.    Anticipate routine postpartum care.    Sofía Cobb M.D.  [facility]  PGY-1

## 2024-05-24 NOTE — CARE PLAN
Problem: Risk for Infection and Impaired Wound Healing  Goal: Patient will remain free from infection  Outcome: Progressing  Note: Hand hygiene before and after pt care     Problem: Pain  Goal: Patient's pain will be alleviated or reduced to the patient’s comfort goal  Outcome: Progressing  Note: Pt received epidural and is comfortable     The patient is Stable - Low risk of patient condition declining or worsening

## 2024-05-24 NOTE — H&P
OB H&P:    HISTORY OF PRESENT ILLNESS:   Carolina Leong is a 34 y.o.  at 39w5d weeks gestation by 39w5d ultrasound who is here for increased contractions. Patient reports feeling increasing frequency and severity of contractions starting at 04:00 this morning. Contractions approximately 3min apart, patient unable to speak through contractions. Also reports associated small vaginal bleeding and mucus at 04:00 with onset of contractions.     Fetal movement: Present  Leakage of Fluid: Denies  Vaginal Bleeding: As described above  Contractions: As described above    Her EDC is 2024, by Patient Reported.   She has not received prenatal care this pregnancy other than prenatal labs and US ordered during hospitalization -24 for pyelonephritis, treated with ceftriaxone, and 3rd trimester labs ordered on 24 OB ED triage visit.  Complications during pregnancy: Denies    PNL:  Rh+, RI, HIV neg, TrepAb neg, HBsAg NR, GC/CT neg/neg in 2019 which was last swab  Glucola: Never done  GBS negative    History of STD: Denies     OBSTETRICAL HISTORY:    OB History    Para Term  AB Living   6 4 4   1 3   SAB IAB Ectopic Molar Multiple Live Births   1         3      # Outcome Date GA Lbr Vijay/2nd Weight Sex Delivery Anes PTL Lv   6 Current            5 Term 07/30/15 39w0d  3.175 kg (7 lb) M Vag-Spont EPI N JOHN      Birth Comments: Pt states no complications.    4 Term 14 39w0d  3.175 kg (7 lb) M Vag-Spont None Y JOHN      Birth Comments: Pt states no complications   3 Term 12 39w6d  3.515 kg (7 lb 12 oz) M Vag-Spont EPI N JOHN      Birth Comments: Denies complications.   2 SAB  12w0d             Birth Comments: no D&C needed    1 Term      Vag-Spont          MEDICAL HISTORY:  Past Medical History:   Diagnosis Date    BV (bacterial vaginosis) 2017       SURGICAL HISTORY:  No past surgical history on file.    MEDICATIONS:  Medications Prior to Admission   Medication Sig  Dispense Refill Last Dose    prenatal plus vitamin (STUARTNATAL 1+1) 27-1 MG Tab tablet Take 1 Tablet by mouth every day. 90 Tablet 3     acetaminophen (TYLENOL) 325 MG Tab Take 325 mg by mouth every 6 hours as needed (headache).          ALLERGIES:    No Known Allergies     SOCIAL HISTORY:   Tobacco use: Denies  Alcohol use: Denies  Recreational drug use: Denies   reports that she has never smoked. She has never used smokeless tobacco. She reports that she does not drink alcohol and does not use drugs.    FAMILY HISTORY:  Denies any family history of bleeding or clotting disorders, HTN, DM, CAD  No family history on file.    REVIEW OF SYSTEMS:  Pertinent items are noted in HPI.    PHYSICAL EXAM:  Temp:  [36.4 °C (97.5 °F)] 36.4 °C (97.5 °F)  Pulse:  [] 102  Resp:  [18] 18  BP: (120-133)/(88-98) 131/88  SpO2:  [98 %-99 %] 98 %  Body mass index is 33.66 kg/m².    General:  AAOx3, NAD, uncomfortable disposition due to contractions   CV: RRR, no M/R/G   Pulmonary:  CTAB, normal effort   Abdomen: Soft and non-tender, gravid uterus   FHT: Baseline 130/moderate variability/+accels/-decels   Contractions: Q4-5min on toco   SVE: 3cm/90%/-2 per MINISTERIO Aleman @ 08:30, prior SVE of 2cm/70%/-2 @ 07:27 per MINISTERIO Aleman     Bedside Ultrasound:   Confirmed vertex presentation    Prenatal Labs:  HepBSAg Neg  HIV NR  RPR NR  Rubella Immune  ABO O+    Group B Strep: negative    BSUS: vertex    ASSESSMENT/ PLAN:   Carolina Leong is a 34 y.o.  at 39w5d gestation by 23w1d US who is here for contractions, found to be in early labor.    #Early Labor  - Admit to L&D  - Re-check cervical exam in 2 hours  - If no cervical change, will plan to augment with pitocin  - AROM when appropriate  - Patient desires epidural for pain as labor progresses    #SIUP @ 39w2d  - Ordered 3rd trimester labs 24 including CBC, RPR, and Hepatitis C. All were negative.  - GBS negative  - 24 fetal US showed fetal survey wnl    #Possible GHTN  vs. Pre-eclampsia  - Patient with no PMH of HTN, no hx of pre-eclampsia with previous pregnancies  - Mildly elevated BP in triage, 130s/80-90s, max BP of 149/97, asymptomatic  - PIH labs ordered including CMP, P/C ratio    #Lack of Prenatal Care  - No GTT on file, glucose wnl on prior BMPs during pregnancy  - CMP ordered    #Prenatal Labs: Rh+, RI, HIV neg, TrepAb neg, HBsAg NR, Hepatitis C NR, GC/CT neg/neg last done in 2019  GBS: negative  Blood Type: O+     #HCM: No Tdap or flu vaccinations during this pregnancy, has not had PNC    Sofía Cobb MD   PGY-1 Resident   [facility]

## 2024-05-24 NOTE — PROGRESS NOTES
Late Entry    1045-Report received from Hoda FLORES RN-pt care assumed.  Pt requests epidural.  1129-Dr. Verduzco at BS to place epidural.  1240-Pt comfortable, SVE=4/90/-2.  1310-Dr. Donis and Duane MANDEL at BS for AROM, clear, 5/80/-2.  1536-SVE=complete/+1.  Dr. Donis and Dr. Cobb updated.  1556-Delivery of viable female, 8/9 apgars.  1610-Placenta delivered, oxytocin running.  1730-BS report given to Jade MANDEL-pt care assumed

## 2024-05-24 NOTE — ANESTHESIA POSTPROCEDURE EVALUATION
Patient: Carolina Leong    Procedure Summary       Date: 05/24/24 Room / Location:     Anesthesia Start: 1128 Anesthesia Stop: 1556    Procedure: Labor Epidural Diagnosis:     Scheduled Providers:  Responsible Provider: Dimitri Verduzco D.O.    Anesthesia Type: epidural ASA Status: 2            Final Anesthesia Type: epidural  Last vitals  BP   Blood Pressure: 131/88    Temp   36.4 °C (97.5 °F)    Pulse   (!) 102   Resp   18    SpO2   98 %      Anesthesia Post Evaluation    Patient location during evaluation: floor  Patient participation: complete - patient participated  Level of consciousness: awake and alert  Pain score: 0    Airway patency: patent  Anesthetic complications: no  Cardiovascular status: hemodynamically stable  Respiratory status: acceptable  Hydration status: euvolemic    PONV: none          No notable events documented.     Nurse Pain Score: 0 (NPRS)

## 2024-05-24 NOTE — ED PROVIDER NOTES
LABOR & DELIVERY TRIAGE HISTORY AND PHYSICAL  Patient Name: Carolina Leong Age/Sex: 34 y.o. female  : 1990 MRN: [id number]      HISTORY OF PRESENT ILLNESS:   Carolina Leong is a 34 y.o.  at 39w5d weeks gestation by 39w5d ultrasound who is here for increased contractions. Patient reports feeling increasing frequency and severity of contractions starting at 04:00 this morning. Contractions approximately 3min apart, patient unable to speak through contractions. Also reports associated small vaginal bleeding and mucus at 04:00 with onset of contractions.     Fetal movement: Present  Leakage of Fluid: Denies  Vaginal Bleeding: As described above  Contractions: As described above    Her EDC is 2024, by Patient Reported.   She has not received prenatal care this pregnancy other than prenatal labs and US ordered during hospitalization -24 for pyelonephritis, treated with ceftriaxone, and 3rd trimester labs ordered on 24 OB ED triage visit.  Complications during pregnancy: Denies    PNL:  Rh+, RI, HIV neg, TrepAb neg, HBsAg NR, GC/CT neg/neg in 2019 which was last swab  Glucola: Never done  GBS negative    History of STD: Denies     OBSTETRICAL HISTORY:    OB History    Para Term  AB Living   6 4 4   1 3   SAB IAB Ectopic Molar Multiple Live Births   1         3      # Outcome Date GA Lbr Vijay/2nd Weight Sex Delivery Anes PTL Lv   6 Current            5 Term 07/30/15 39w0d  3.175 kg (7 lb) M Vag-Spont EPI N JOHN      Birth Comments: Pt states no complications.    4 Term 14 39w0d  3.175 kg (7 lb) M Vag-Spont None Y JOHN      Birth Comments: Pt states no complications   3 Term 12 39w6d  3.515 kg (7 lb 12 oz) M Vag-Spont EPI N JOHN      Birth Comments: Denies complications.   2 SAB  12w0d             Birth Comments: no D&C needed    1 Term      Vag-Spont          MEDICAL HISTORY:  Past Medical History:   Diagnosis Date    BV (bacterial vaginosis) 2017        SURGICAL HISTORY:  No past surgical history on file.    MEDICATIONS:  Medications Prior to Admission   Medication Sig Dispense Refill Last Dose    prenatal plus vitamin (STUARTNATAL 1+1) 27-1 MG Tab tablet Take 1 Tablet by mouth every day. 90 Tablet 3     acetaminophen (TYLENOL) 325 MG Tab Take 325 mg by mouth every 6 hours as needed (headache).          ALLERGIES:    No Known Allergies     SOCIAL HISTORY:   Tobacco use: Denies  Alcohol use: Denies  Recreational drug use: Denies   reports that she has never smoked. She has never used smokeless tobacco. She reports that she does not drink alcohol and does not use drugs.    FAMILY HISTORY:  Denies any family history of bleeding or clotting disorders, HTN, DM, CAD  No family history on file.    REVIEW OF SYSTEMS:  Pertinent items are noted in HPI.    PHYSICAL EXAM:  Temp:  [36.4 °C (97.5 °F)] 36.4 °C (97.5 °F)  Pulse:  [] 102  Resp:  [18] 18  BP: (120-133)/(88-98) 131/88  SpO2:  [98 %-99 %] 98 %  Body mass index is 33.66 kg/m².    General:  AAOx3, NAD, uncomfortable disposition due to contractions   CV: RRR, no M/R/G   Pulmonary:  CTAB, normal effort   Abdomen: Soft and non-tender, gravid uterus   FHT: Baseline 130/moderate variability/+accels/-decels   Contractions: Q4-5min on toco   SVE: 3cm/90%/-2 per RN Love @ 08:30, prior SVE of 2cm/70%/-2 @ 07:27 per RN Love     Bedside Ultrasound:   Confirmed vertex presentation    Prenatal Labs:  HepBSAg Neg  HIV NR  RPR NR  Rubella Immune  ABO O+    Group B Strep: negative    BSUS: vertex    ASSESSMENT/ PLAN:   Carolina Leong is a 34 y.o.  at 39w5d gestation by 23w1d US who is here for contractions, found to be in early labor.    #Early Labor  - Admit to L&D  - Re-check cervical exam in 2 hours  - If no cervical change, will plan to augment with pitocin  - AROM when appropriate  - Patient desires epidural for pain as labor progresses    #SIUP @ 39w2d  - Ordered 3rd trimester labs 24 including  CBC, RPR, and Hepatitis C. All were negative.  - GBS negative  - 1/29/24 fetal US showed fetal survey wnl    #Possible GHTN vs. Pre-eclampsia  - Patient with no PMH of HTN, no hx of pre-eclampsia with previous pregnancies  - Mildly elevated BP in triage, 130s/80-90s, max BP of 149/97, asymptomatic  - PIH labs ordered including CMP, P/C ratio    #Lack of Prenatal Care  - No GTT on file, glucose wnl on prior BMPs during pregnancy  - CMP ordered    #Prenatal Labs: Rh+, RI, HIV neg, TrepAb neg, HBsAg NR, Hepatitis C NR, GC/CT neg/neg last done in 2019  GBS: negative  Blood Type: O+     #HCM: No Tdap or flu vaccinations during this pregnancy, has not had PNC    Sofía Cobb MD   PGY-1 Resident   [facility]

## 2024-05-24 NOTE — ANESTHESIA PREPROCEDURE EVALUATION
Date: 24  Procedure: Labor Epidural         Relevant Problems   OB   (positive) History of Gestational diabetes      Other   (positive) Indication for care in labor or delivery    @ 39w5d, IUP, Grace    Physical Exam    Airway   Mallampati: II  TM distance: >3 FB  Neck ROM: full       Cardiovascular - normal exam  Rhythm: regular  Rate: normal  (-) murmur     Dental - normal exam           Pulmonary - normal exam  Breath sounds clear to auscultation     Abdominal    Neurological - normal exam                   Anesthesia Plan    ASA 2       Plan - epidural   Neuraxial block will be labor analgesia                  Pertinent diagnostic labs and testing reviewed    Informed Consent:    Anesthetic plan and risks discussed with patient.

## 2024-05-24 NOTE — ANESTHESIA TIME REPORT
Anesthesia Start and Stop Event Times       Date Time Event    5/24/2024 1119 Ready for Procedure     1128 Anesthesia Start     1556 Anesthesia Stop          Responsible Staff  05/24/24      Name Role Begin End    Dimitri Verduzco D.O. Anesth 1128 1556          Overtime Reason:  no overtime (within assigned shift)    Comments:

## 2024-05-24 NOTE — ANESTHESIA PROCEDURE NOTES
Epidural Block    Date/Time: 5/24/2024 11:32 AM    Performed by: Dimitri Verduzco D.O.  Authorized by: Dimitri Verduzco D.O.    Patient Location:  OB  Start Time:  5/24/2024 11:32 AM  End Time:  5/24/2024 11:38 AM  Reason for Block: labor analgesia    patient identified, IV checked, site marked, risks and benefits discussed, surgical consent, monitors and equipment checked and pre-op evaluation    Patient Position:  Sitting  Prep: ChloraPrep, patient draped and sterile technique    Monitoring:  Blood pressure, continuous pulse oximetry and heart rate  Approach:  Midline  Location:  L3-L4  Injection Technique:  CRYSTAL air  Skin infiltration:  Lidocaine  Strength:  1%  Dose:  3ml  Needle Type:  Tuohy  Needle Gauge:  17 G  Needle Length:  3.5 in  Loss of resistance::  5  Catheter Size:  19 G  Catheter at Skin Depth:  9  Test Dose Result:  Negative

## 2024-05-24 NOTE — PROGRESS NOTES
0700: pt to labor and delivery, pt reports UCs starting at 0400 this morning. Efm and toco applied. Pt reports a small amount of spotting.  Pt denies LOF.  Pt denies prenatal care throughout the pregnancy.  Pt has been seen in [facility].  OB US done on 1/26 at 23 weeks giving an edc of 5/26/24. SVE 2/70/-2, vertex. Pt having UCs every 5 min.  Will reassess in one hour.     0830: rn to bs, pt reports uc's are strong and consistent. Sve 3/90/-2, bloody show noted. Pt requests epidural.     0900: Dr. Cobb at bs, orders to admit to labor and delivery.

## 2024-05-25 LAB
ERYTHROCYTE [DISTWIDTH] IN BLOOD BY AUTOMATED COUNT: 44.2 FL (ref 35.9–50)
HCT VFR BLD AUTO: 34.7 % (ref 37–47)
HGB BLD-MCNC: 11.7 G/DL (ref 12–16)
MCH RBC QN AUTO: 27.6 PG (ref 27–33)
MCHC RBC AUTO-ENTMCNC: 33.7 G/DL (ref 32.2–35.5)
MCV RBC AUTO: 81.8 FL (ref 81.4–97.8)
PLATELET # BLD AUTO: 191 K/UL (ref 164–446)
PMV BLD AUTO: 11.4 FL (ref 9–12.9)
RBC # BLD AUTO: 4.24 M/UL (ref 4.2–5.4)
WBC # BLD AUTO: 9.4 K/UL (ref 4.8–10.8)

## 2024-05-25 PROCEDURE — 770002 HCHG ROOM/CARE - OB PRIVATE (112)

## 2024-05-25 PROCEDURE — A9270 NON-COVERED ITEM OR SERVICE: HCPCS

## 2024-05-25 PROCEDURE — 700102 HCHG RX REV CODE 250 W/ 637 OVERRIDE(OP)

## 2024-05-25 PROCEDURE — 36415 COLL VENOUS BLD VENIPUNCTURE: CPT

## 2024-05-25 PROCEDURE — 85027 COMPLETE CBC AUTOMATED: CPT

## 2024-05-25 RX ADMIN — ACETAMINOPHEN 1000 MG: 500 TABLET, FILM COATED ORAL at 03:22

## 2024-05-25 RX ADMIN — ACETAMINOPHEN 1000 MG: 500 TABLET, FILM COATED ORAL at 20:03

## 2024-05-25 ASSESSMENT — PAIN DESCRIPTION - PAIN TYPE: TYPE: ACUTE PAIN

## 2024-05-25 NOTE — PROGRESS NOTES
1900-Rcvd report from dayshift RN and assumed care of pt.  1945-Pt up to bathroom and anh area cleaned. Pt transferred to  via wheelchair in stable condition with baby. Report given to Fabiola MANDEL

## 2024-05-25 NOTE — PROGRESS NOTES
1730: Bedside report from Lorin MANDEL. Patient resting in bed, VSS. Infant in arms. Needs met at this time.     1900: Report to Lola MANDEL at bedside.

## 2024-05-25 NOTE — CARE PLAN
The patient is Stable - Low risk of patient condition declining or worsening    Shift Goals  Clinical Goals: Light lochia, firm fundus  Patient Goals: Rest    Progress made toward(s) clinical / shift goals: Pt has light lochia, fundus is firm and at U, pt to stay one more night and likely DC tomorrow.        Problem: Knowledge Deficit - Postpartum  Goal: Patient will verbalize and demonstrate understanding of self and infant care  Outcome: Progressing     Problem: Psychosocial - Postpartum  Goal: Patient will verbalize and demonstrate effective bonding and parenting behavior  Outcome: Progressing     Problem: Altered Physiologic Condition  Goal: Patient physiologically stable as evidenced by normal lochia, palpable uterine involution and vitals within normal limits  Outcome: Progressing       Patient is not progressing towards the following goals:

## 2024-05-25 NOTE — PROGRESS NOTES
Obstetrics & Gynecology Post-Delivery Progress Note    Date of Service    34 y.o.  s/p vaginal, spontaneous  Delivery date: 24    Subjective  Pt reports her pain has been well-controlled with prn Tylenol, has had minimal bleeding. Is ambulating well without lightheadedness or dizziness. Denies any nausea, vomiting. Does report having some difficulty with breastfeeding due to difficult latch.     Pain: Well controlled  Bleeding: lochia minimal  Tolerating PO: yes  Voiding: without difficulty  Ambulating: yes  Passing flatus: Yes  Feeding: breastfeeding with concerns for infant latch and decreased production    Objective  24hr VS:  Temp:  [36.1 °C (96.9 °F)-36.7 °C (98.1 °F)] 36.5 °C (97.7 °F)  Pulse:  [] 79  Resp:  [15-18] 18  BP: (110-136)/(66-98) 112/83  SpO2:  [97 %-99 %] 98 %    Physical Exam  Gen: well appearing, no apparent distress, resting comfortably in bed  CV: rrr, no m/r/g  Resp: CTAB, symmetric breath sounds  Abd: soft, nontender, nondistended  Fundus: firm, below umbilicus, and nontender  Incision: not applicable, (vaginal delivery)  Ext: symmetric, no peripheral edema, calves nontender    Labs:  Recent Labs     24  0928 24  0502   WBC 9.4 9.4   RBC 4.81 4.24   HEMOGLOBIN 13.3 11.7*   HEMATOCRIT 38.8 34.7*   MCV 80.7* 81.8   MCH 27.7 27.6   RDW 43.3 44.2   PLATELETCT 199 191   MPV 11.5 11.4   NEUTSPOLYS 78.10*  --    LYMPHOCYTES 15.10*  --    MONOCYTES 5.60  --    EOSINOPHILS 0.50  --    BASOPHILS 0.20  --        Medications     PRN medications: LR, ibuprofen, acetaminophen, tetanus-dipth-acell pertussis, measles, mumps and rubella vaccine, simethicone, polyethylene glycol/lytes      Assessment/Plan  Carolina Leong is a 34 y.o.yo  postpartum day #1  s/p vaginal, spontaneous    - Post care: meeting all goals  - Pain: controlled  - Rh+, Rubella Immune  - Method of Feeding: plans to breastfeed, has concerns about latch and breast milk production, plan for  lactation consultant to work with patient today and for patient and infant to stay for breastfeeding support  - Method of Contraception:  Patient declines Depo-provera injection and progesterone-only pills at this time, discussed with patient risks of recurrent pregnancy, recommendation for pelvic rest [id number] and use of barrier methods of contraception. Will revisit discussion in morning and recommend follow-up with OB.     VTE prophylaxis: none indicated    - Disposition: likely home PPD2     Sofía Cobb M.D.  PGY-1, [facility]

## 2024-05-25 NOTE — LACTATION NOTE
"This note was copied from a baby's chart.  Initial Consult:     Pt is Macedonian speaking. Visit conducted via translation services from [employer] via iPad,  Mary Kate # [id number].    History:  MOB, Carolina, is a 33 y/o  s/p  at 39+5 wks on 2024 at 1556. No PNC.    Baby girl had BW 3100g (6 lbs, 13.4 oz).        History of BF:  Carolina reports she  her 4 prior children - oldest is 10 y/o - without difficulty.       Report of Current Breastfeeding Status:  Carolina is breast and bottle feeding. She reports she is offering breast first and topping baby off with formula because she has \"no milk.\" She is supplementing 10 mL per feed per supplemental guidelines.    Carolina denies breast/nipple discomfort. She reports she knows how to hand express.    Baby girl is quiet alert in her lap, cueing, swaddled x2 with hat. Good color and tone. She is able to lift/cup/extend her tongue over lips.     Breastfeeding Assistance:      Provided breastfeeding education on: skin to skin, supply and demand, hunger cues, frequency/duration of breastfeeds, physiology/timing of lactation.     [facility] Breastfeeding Resources handout provided and outpatient lactation care and support [place] options reviewed.     Carolina does not have WIC, but would like it. With her permission, faxed referral to [facility] today.     Encouraged Carolina to reach out via bedside RN if she would like a latch assist/assessment later.     Plan: Continue to offer infant the breast per feeding cues for a minimum of 8 or more feeds in a 24 hour period.  Frequent skin to skin as Carolina is awake and attentive.      "

## 2024-05-25 NOTE — DISCHARGE PLANNING
Discharge Planning Assessment Post Partum    Reason for Referral: Limited prenatal care   Address: [address] [place] [address]  Phone: [phone]  Type of Living Situation: stable housing   Mom Diagnosis: Pregnancy, vaginal delivery   Baby Diagnosis: -39.5 weeks   Primary Language: Polish speaking.  : [employer] #[id number]    Name of Baby:  Mimi Lieberman (: 24)  Father of the Baby: Gino Ortiz   Involved in baby’s care? Yes  Contact Information: [phone]    Prenatal Care: limited prenatal care-completed labs and ultrasound   Mom's PCP: No PCP listed   PCP for new baby: Pediatrician list provided     Support System: FOB  Coping/Bonding between mother & baby: Yes  Source of Feeding: breast and formula   Supplies for Infant: prepared for infant-states she has a car seat and basic supplies for infant     Mom's Insurance: Emergency Medicaid   Baby Covered on Insurance: Yes  Mother Employed/School: Not currently   Other children in the home/names & ages: three boys; ages 11, 9, and 8 years     Financial Hardship/Income: No   Mom's Mental status: alert and oriented   Services used prior to admit: Medicaid     CPS History: No  Psychiatric History: No  Domestic Violence History: No  Drug/ETOH History: No, infant's UDS was negative    Resources Provided: pediatrician list, children and family resource list, post partum support and counseling resources, diaper bank assistance resources, and list of WIC clinics provided   Referrals Made: diaper bank referrals provided      Clearance for Discharge: Infant is cleared to discharge home with parents once medically cleared

## 2024-05-26 ENCOUNTER — PHARMACY VISIT (OUTPATIENT)
Dept: PHARMACY | Facility: MEDICAL CENTER | Age: 34
End: 2024-05-26
Payer: COMMERCIAL

## 2024-05-26 VITALS
HEART RATE: 86 BPM | WEIGHT: 190 LBS | SYSTOLIC BLOOD PRESSURE: 116 MMHG | BODY MASS INDEX: 33.66 KG/M2 | TEMPERATURE: 97.3 F | DIASTOLIC BLOOD PRESSURE: 86 MMHG | RESPIRATION RATE: 16 BRPM | HEIGHT: 63 IN | OXYGEN SATURATION: 97 %

## 2024-05-26 PROCEDURE — RXMED WILLOW AMBULATORY MEDICATION CHARGE: Performed by: OBSTETRICS & GYNECOLOGY

## 2024-05-26 RX ORDER — IBUPROFEN 800 MG/1
800 TABLET ORAL EVERY 8 HOURS PRN
Qty: 30 TABLET | Refills: 0 | Status: SHIPPED | OUTPATIENT
Start: 2024-05-26

## 2024-05-26 RX ORDER — ACETAMINOPHEN 500 MG
1000 TABLET ORAL EVERY 6 HOURS PRN
Qty: 30 TABLET | Refills: 0 | Status: SHIPPED | OUTPATIENT
Start: 2024-05-26 | End: 2024-06-25

## 2024-05-26 RX ORDER — POLYETHYLENE GLYCOL 3350 17 G/17G
17 POWDER, FOR SOLUTION ORAL
Qty: 238 G | Refills: 0 | Status: SHIPPED | OUTPATIENT
Start: 2024-05-26

## 2024-05-26 RX ORDER — SIMETHICONE 125 MG
125 TABLET,CHEWABLE ORAL 4 TIMES DAILY PRN
Qty: 30 TABLET | Refills: 0 | Status: SHIPPED | OUTPATIENT
Start: 2024-05-26

## 2024-05-26 ASSESSMENT — EDINBURGH POSTNATAL DEPRESSION SCALE (EPDS)
I HAVE BEEN SO UNHAPPY THAT I HAVE HAD DIFFICULTY SLEEPING: YES, SOMETIMES
I HAVE BLAMED MYSELF UNNECESSARILY WHEN THINGS WENT WRONG: NO, NEVER
THINGS HAVE BEEN GETTING ON TOP OF ME: NO, I HAVE BEEN COPING AS WELL AS EVER
I HAVE FELT SAD OR MISERABLE: NO, NOT AT ALL
I HAVE BEEN ANXIOUS OR WORRIED FOR NO GOOD REASON: YES, SOMETIMES
THE THOUGHT OF HARMING MYSELF HAS OCCURRED TO ME: NEVER
I HAVE FELT SCARED OR PANICKY FOR NO GOOD REASON: YES, SOMETIMES
I HAVE LOOKED FORWARD WITH ENJOYMENT TO THINGS: AS MUCH AS I EVER DID
I HAVE BEEN ABLE TO LAUGH AND SEE THE FUNNY SIDE OF THINGS: NOT QUITE SO MUCH NOW
I HAVE BEEN SO UNHAPPY THAT I HAVE BEEN CRYING: NO, NEVER

## 2024-05-26 NOTE — DISCHARGE SUMMARY
Discharge Summary:     Date of Admission: 2024  Date of Discharge: 24    Admitting diagnosis:    1. Pregnancy @ 39w5d  2. Lack of Prenatal Care      Discharge Diagnosis:   1. Status post vaginal, spontaneous.  2. Lack of Prenatal Care    Past Medical History:   Diagnosis Date    BV (bacterial vaginosis) 2017     OB History    Para Term  AB Living   6 5 5   1 4   SAB IAB Ectopic Molar Multiple Live Births   1       0 4      # Outcome Date GA Lbr Vijay/2nd Weight Sex Delivery Anes PTL Lv   6 Term 24 39w5d  3.1 kg (6 lb 13.4 oz) F Vag-Spont EPI N JOHN   5 Term 07/30/15 39w0d  3.175 kg (7 lb) M Vag-Spont EPI N JOHN      Birth Comments: Pt states no complications.    4 Term 14 39w0d  3.175 kg (7 lb) M Vag-Spont None Y JOHN      Birth Comments: Pt states no complications   3 Term 12 39w6d  3.515 kg (7 lb 12 oz) M Vag-Spont EPI N JOHN      Birth Comments: Denies complications.   2 SAB  12w0d             Birth Comments: no D&C needed    1 Term      Vag-Spont        History reviewed. No pertinent surgical history.  Patient has no known allergies.    Patient Active Problem List   Diagnosis    History of Gestational diabetes    UTI (urinary tract infection)    Pyelonephritis    Indication for care in labor or delivery     Hospital Course:   Pt is a 34 y.o. now  who presented for active labor on 24. Patient received epidural, had AROM with clear fluid, and progressed to  of viable female infant on  at 16:11. Patient progressed well postpartum, meeting all postpartum milestones. Medically stable for discharge home on 24 in AM.     Physical Exam:  Temp:  [36.3 °C (97.3 °F)-36.6 °C (97.8 °F)] 36.3 °C (97.3 °F)  Pulse:  [86-87] 86  Resp:  [16] 16  BP: (116-120)/(84-86) 116/86  SpO2:  [97 %] 97 %  Physical Exam  General: well and resting  Abdomen: nontender, normal bowel sounds, soft, non-distended  Fundus: firm, below umbilicus, and nontender  Incision: not  applicable, (vaginal delivery)  Extremities: symmetric and no edema, calves nontender    Current Facility-Administered Medications   Medication Dose    LR infusion      oxytocin (Pitocin) infusion (for post delivery)  125 mL/hr    ropivacaine 0.2 % (Naropin) injection      lactated ringers infusion      ibuprofen (Motrin) tablet 800 mg  800 mg    acetaminophen (Tylenol) tablet 1,000 mg  1,000 mg    tetanus-dipth-acell pertussis (Tdap) inj 0.5 mL  0.5 mL    measles, mumps and rubella vaccine (Mmr) injection 0.5 mL  0.5 mL    simethicone (Mylicon) chewable tablet 125 mg  125 mg    polyethylene glycol/lytes (Miralax) Packet 1 Packet  1 Packet       Recent Labs     05/24/24  0928 05/25/24  0502   WBC 9.4 9.4   RBC 4.81 4.24   HEMOGLOBIN 13.3 11.7*   HEMATOCRIT 38.8 34.7*   MCV 80.7* 81.8   MCH 27.7 27.6   MCHC 34.3 33.7   RDW 43.3 44.2   PLATELETCT 199 191   MPV 11.5 11.4     Activity/ Discharge Instructions::   Discharge to home  Pelvic Rest x 6 weeks  No heavy lifting x4 weeks  Call or come to ED for: heavy vaginal bleeding, fever >100.4, severe abdominal pain, severe headache, chest pain, shortness of breath,  N/V, or other concerns.    Follow up:  [facility] in 5 weeks for vaginal delivery.     Discharge Meds:   Current Outpatient Medications   Medication Sig Dispense Refill    acetaminophen (TYLENOL) 500 MG Tab Take 2 Tablets by mouth every 6 hours as needed for Mild Pain or Moderate Pain for up to 30 days. 30 Tablet 0    ibuprofen (MOTRIN) 800 MG Tab Take 1 Tablet by mouth every 8 hours as needed for Mild Pain or Moderate Pain. 30 Tablet 0    polyethylene glycol/lytes (MIRALAX) Pack Take 1 Packet by mouth 1 time a day as needed (constipation). 14 Each 0    simethicone (MYLICON) 125 MG chewable tablet Chew 1 Tablet 4 times a day as needed for Flatulence. 30 Tablet 0       Sofía Cobb M.D.  PGY-1  [facility]

## 2024-05-26 NOTE — CARE PLAN
The patient is Stable - Low risk of patient condition declining or worsening    Shift Goals  Clinical Goals: VSS, fundus firm, lochia wdl, pain control, rest  Patient Goals: Rest    Progress made toward(s) clinical / shift goals:    Problem: Knowledge Deficit - Postpartum  Goal: Patient will verbalize and demonstrate understanding of self and infant care  Description: Target End Date:  1-3 days or as soon as patient condition allows    Document in Patient Education    1.  Assess patient and knowledge of self and infant care  2.  Educate patient verbally, by demonstration and written material  Outcome: Progressing     Problem: Psychosocial - Postpartum  Goal: Patient will verbalize and demonstrate effective bonding and parenting behavior  Description: Target End Date:  1 to 4 days    1.  Assess patient for anxiety or apprehension regarding parenting role  2.  Provide emotional support and encouragement to patient/family/caregiver  Outcome: Progressing     Problem: Altered Physiologic Condition  Goal: Patient physiologically stable as evidenced by normal lochia, palpable uterine involution and vitals within normal limits  Description: Target End Date:  1 to 4 days    Document on Assessment flowsheet    1.  Perform physical assessment and obtain vitals per intrapartum/postpartum standards of care  2.  Follow epidural/spinal narcotic protocol if patient has received a long acting narcotic  3.  Massage fundus as necessary to prevent excessive lochia  4.  Administer pitocin, methergine, cytotec or hemabate as ordered  Outcome: Progressing

## 2024-05-26 NOTE — PROGRESS NOTES
0738 Report received from Anamaria MANDEL, POC discussed.     0830 RN at bedside, assessment completed, pt denies any needs at this time    1212 RN at bedside,  line used to communicate discharge information. All questions answered. Pt will have family member her to take her home around [id number]. Pt to call out when they arrive.     1305 RN at bedside, postpartum precautions given and all questions answered. Infant car seat brought by family, pt discharged home with appropriate follow up care. Pt ambulated off unit with family and escort.

## 2024-05-26 NOTE — LACTATION NOTE
"This note was copied from a baby's chart.  Follow-Up Consult:     Pt is Kiswahili speaking. Visit conducted via translation services from [employer] via phone,  Shon #[id number].    History:    MOB, Carolina, is a 35 y/o  s/p  at 39+5 wks on 2024 at 1556. No PNC.     Baby girl had BW 3100g (6 lbs, 13.4 oz). Loss of 1.39% at last wt.      History of BF:  Carolina reports she  her 4 prior children - oldest is 12 y/o - without difficulty.      Report of Current Breastfeeding Status:  Carolina is breast and bottle feeding. She is feeding formula overnight because she has \"no milk.\" She is supplementing 20 mL per feed per supplemental guidelines.     Carolina denies breast/nipple discomfort. She reports she knows how to hand express. Breasts are soft/full/symmetrical, nipples are everted/pliable/intact. Colostrum is easily expressible and copious.     Baby girl is quiet alert in her lap, cueing, swaddled x2 at time of visit. Good color and tone. She is able to lift/cup/extend her tongue over lips.     Breastfeeding Assistance:     Placed baby skin-to-skin.    Carolina is able to hand express colostrum independently.     Assisted Carolina to position baby at the right breast in the football position.  Taught Carolina to place baby tummy to tummy and nipple to nose, how to wedge her breast, and hand express to tease baby to a wide gape and achieve deep latch.      Infant latched deep to breast and suckled with nutritive pattern, audible swallows noted.      She was able to latch baby at left breast in cross-cradle and cradle position.     Baby was fussy and difficult to latch at breast. Gassy. She was able to sustain organized pattern with an occasional drop of formula in the corner of the mouth. Discussed flow confusion when switching between bottle and breast. Encouraged Carolina to offer breast first.      Provided breastfeeding education on: skin to skin, supply and demand " (extensively), hunger cues, frequency/duration of breastfeeds, cluster feeding, shallow vs deep latch, and nutritive vs non-nutritive suck.    Provided hand pump and encouraged Carolina to hand express and pump to stimulate breasts if she gives formula at a feeding or if baby does not latch and feed well. Also provided and reviewed handouts on hand expression, pump cleaning, and milk storage.      [facility] Breastfeeding Resources handout provided and outpatient lactation care and support [place] options reviewed.     [facility] referral faxed yesterday.    Breastfeeding Plan:       Breastfeed baby on demand based on early feeding cues at least 8x in 24hrs. It is not recommended to let baby sleep longer than 4 hours between feedings and if sleepy, place skin to skin to promote feeding interest and milk production.  Baby's usually feed more frequently and longer when skin to skin with mother. Continue to hand express prior to latch.       Expect cluster feeding as this is normal during early days of life and growth spurts. Baby may feed more frequently and for longer periods of time.  Its normal for a baby to want to feed up to 18x in24 hr period. This frequent feeding is activating hormones to make mature breastmilk.  Breastmilk should increase in volume by postpartum day 4.  Signs and symptoms of engorgement might occur around that time.     It is not recommended to use pacifiers or supplementing with formula until breast feeding and milk production is well established or at least 4 weeks.    Make sure infant is getting enough by ensuring frequent feedings as well as looking for transitioning stools from dark meconium to bright yellow/green seedy loose stools by day 5.     Follow up with PEDS PCP as scheduled for weight checks and to make sure feeding is progressing appropriately.    Engorgement care: frequent breastfeeding, gentle (like petting a cat) massage towards axilla, cool compresses, hand express to  comfort and to soften nipple area for latch, NSAIDs (like ibuprofen) as prescribed by OB for postpartum pain relief. Contact your OB provider if you develop a hard, warm, reddened lump especially if you also have a fever, chills, aches as this is concerning for mastitis.      Create a comfortable and relaxing environment for breastfeeding, minimizing distractions and ensuring proper positioning for mom and baby. Hold baby skin to skin with mom or dad as much as possible when you are awake and alert, skin to skin promotes bonding and breastfeeding success.      Breastfeeding support is available!      [facility] holds a free Breastfeeding [place] every Thursday from 11am-12pm lead by a Lactation Consultant.  No appointment necessary.  Excludes holidays. Bring your baby!  Location: [facility] in [facility]  3rd floor conference room.

## 2024-05-26 NOTE — PROGRESS NOTES
1900- Received report from Jacklyn MANDEL. Assumed care. 12 hour chart check, MAR and orders reviewed.      2000- Assessment complete. Fundus firm and palpable, lochia scant rubra. Pain management and interventions discussed with pt. POC discussed. All questions and concerns discussed. No further concerns.

## 2024-05-26 NOTE — DISCHARGE INSTRUCTIONS
PATIENT DISCHARGE EDUCATION INSTRUCTION SHEET    REASONS TO CALL YOUR OBSTETRICIAN  Persistent fever, shaking, chills (Temperature higher than 100.4) may indicate you have an infection  Heavy bleeding: soaking more than 1 pad per hour; Passing clots an egg-sized clot or bigger may mean you have an postpartum hemorrhage  Foul odor from vagina or bad smelling or discolored discharge or blood  Breast infection (Mastitis symptoms); breast pain, chills, fever, redness or red streaks, may feel flu like symptoms  Urinary pain, burning or frequency  Incision that is not healing, increased redness, swelling, tenderness or pain, or any pus from episiotomy or  site may mean you have an infection  Redness, swelling, warmth, or painful to touch in the calf area of your leg may mean you have a blood clot  Severe or intensified depression, thoughts or feelings of wanting to hurt yourself or someone else   Pain in chest, obstructed breathing or shortness of breath (trouble catching your breath) may mean you are having a postpartum complication. Call your provider immediately   Headache that does not get better, even after taking medicine, a bad headache with vision changes or pain in the upper right area of your belly may mean you have high blood pressure or post birth preeclampsia. Call your provider immediately    HAND WASHING  All family and friends should wash their hands:  Before and after holding the baby  Before feeding the baby  After using the restroom or changing the baby's diaper    WOUND CARE  Ask your physician for additional care instructions. In general:   Incision:  May shower and pat incision dry   Keep the incision clean and dry  There should not be any opening or pus from the incision  Continue to walk at home 3 times a day   Do NOT lift anything heavier than your baby (over 10 pounds)  Encourage family to help participate in care of the  to allow rest and mom time to  heal  Episiotomy/Laceration  May use anh-spray bottle, witch hazel pads and dermaplast spray for comfort  Use anh-spray bottle after urinating to cleanse perineal area  To prevent burning during urination spray anh-water bottle on labial area   Pat perineal area dry until episiotomy/laceration is healed  Continue to use anh-bottle until bleeding stops as needed  If have a 2nd degree laceration or greater, a Sitz bath can offer relief from soreness, burning, and inflammation   Sitz Bath   Sit in 6 inches of warm water and soak laceration as needed until the laceration heals    VAGINAL CARE AND BLEEDING  Nothing inside vagina for 6 weeks:   No sexual intercourse, tampons or douching  Bleeding may continue for 2-4 weeks. Amount and color may vary  Soaking 1 pad or more in an hour for several hours is considered heavy bleeding  Passing large egg sized blood clots can be concerning  If you feel like you have heavy bleeding or are having increasing amount of blood clots call your Obstetrician immediately  If you begin feeling faint upon standing, feeling sick to your stomach, have clammy skin, a really fast heartbeat, have chills, start feeling confused, dizzy, sleepy or weak, or feeling like you're going to faint call your Obstetrician immediately    HYPERTENSION   Preeclampsia or gestational hypertension are types of high blood pressure that only pregnant women can get. It is important for you to be aware of symptoms to seek early intervention and treatment. If you have any of these symptoms immediately call your Obstetrician    Vision changes or blurred vision   Severe headache or pain that is unrelieved with medication and will not go away  Persistent pain in upper abdomen or shoulder   Increased swelling of face, feet, or hands  Difficulty breathing or shortness of breath at rest  Urinating less than usual    URINATION AND BOWEL MOVEMENTS  Eating more fiber (bran cereal, fruits, and vegetables) and drinking  "plenty of fluids will help to avoid constipation  Urinary frequency and urgency after childbirth is normal  If you experience any urinary pain, burning or frequency call your provider    BIRTH CONTROL  It is possible to become pregnant at any time after delivery and while breastfeeding  Plan to discuss a method of birth control with your physician at your post delivery follow up visit    POSTPARTUM BLUES  During the first few days after birth, you may experience a sense of the \"blues\" which may include impatience, irritability or even crying. These feelings come and go quickly. However, as many as 1 in 10 women experience emotional symptoms known as postpartum depression.     POSTPARTUM DEPRESSION    May start as early as the second or third day after delivery or take several weeks or months to develop. Symptoms of \"blues\" are present, but are more intense: Crying spells; loss of appetite; feelings of hopelessness or loss of control; fear of touching the baby; over concern or no concern at all about the baby; little or no concern about your own appearance/caring for yourself; and/or inability to sleep or excessive sleeping. Contact your Obstetrician if you are experiencing any of these symptoms     PREVENTING SHAKEN BABY  If you are angry or stressed, PUT THE BABY IN THE CRIB, step away, take some deep breaths, and wait until you are calm to care for the baby. DO NOT SHAKE THE BABY. You are not alone, call a supporter for help.  [facility] 24/7 crisis call line (854-245-3840) or (1-983.865.4634)  You can also text them, text \"ANSWER\" (323617)    Breastfeeding Plan:       Breastfeed baby on demand based on early feeding cues at least 8x in 24hrs. It is not recommended to let baby sleep longer than 4 hours between feedings and if sleepy, place skin to skin to promote feeding interest and milk production.  Baby's usually feed more frequently and longer when skin to skin with mother. Continue to hand express " prior to latch.       Expect cluster feeding as this is normal during early days of life and growth spurts. Baby may feed more frequently and for longer periods of time.  Its normal for a baby to want to feed up to 18x in24 hr period. This frequent feeding is activating hormones to make mature breastmilk.  Breastmilk should increase in volume by postpartum day 4.  Signs and symptoms of engorgement might occur around that time.     It is not recommended to use pacifiers or supplementing with formula until breast feeding and milk production is well established or at least 4 weeks.    Make sure infant is getting enough by ensuring frequent feedings as well as looking for transitioning stools from dark meconium to bright yellow/green seedy loose stools by day 5.     Follow up with PEDS PCP as scheduled for weight checks and to make sure feeding is progressing appropriately.    Engorgement care: frequent breastfeeding, gentle (like petting a cat) massage towards axilla, cool compresses, hand express to comfort and to soften nipple area for latch, NSAIDs (like ibuprofen) as prescribed by OB for postpartum pain relief. Contact your OB provider if you develop a hard, warm, reddened lump especially if you also have a fever, chills, aches as this is concerning for mastitis.      Create a comfortable and relaxing environment for breastfeeding, minimizing distractions and ensuring proper positioning for mom and baby. Hold baby skin to skin with mom or dad as much as possible when you are awake and alert, skin to skin promotes bonding and breastfeeding success.      Breastfeeding support is available!      [facility] holds a free Breastfeeding [place] every Thursday from 11am-12pm lead by a Lactation Consultant.  No appointment necessary.  Excludes holidays. Bring your baby!  [facility]  3rd floor conference room.

## 2024-07-01 NOTE — PROGRESS NOTES
Monitor Summary:    Rhythm: SR  Rate: 81-96  Ectopy: none  Intervals: .14/.08/.40     Screening Evaluation    I have evaluated the patient for the purpose of an initial medical screening and to expedite care.     Cynthia Liu is a 43 year old female who presents to the emergency department today with right flank pain.  Seen at PCP with blood in urine.  No fevers.  .     Limited screening exam:  Gen: Pt is alert, in no acute distress  Resp: Breathing comfortably, unlabored  HEENT:      Initial orders have been placed to expedite care. Primary definitive care to be performed by subsequent medical teams.         Kelsey Bradley, CNP  07/01/24 [id number]